# Patient Record
Sex: FEMALE | Race: WHITE | NOT HISPANIC OR LATINO | Employment: UNEMPLOYED | URBAN - METROPOLITAN AREA
[De-identification: names, ages, dates, MRNs, and addresses within clinical notes are randomized per-mention and may not be internally consistent; named-entity substitution may affect disease eponyms.]

---

## 2017-12-20 ENCOUNTER — NON-PROVIDER VISIT (OUTPATIENT)
Dept: URGENT CARE | Facility: PHYSICIAN GROUP | Age: 32
End: 2017-12-20

## 2017-12-20 ENCOUNTER — NON-PROVIDER VISIT (OUTPATIENT)
Dept: OCCUPATIONAL MEDICINE | Facility: CLINIC | Age: 32
End: 2017-12-20

## 2017-12-20 DIAGNOSIS — Z02.1 PRE-EMPLOYMENT DRUG SCREENING: ICD-10-CM

## 2017-12-20 DIAGNOSIS — Z11.1 ENCOUNTER FOR PPD TEST: Primary | ICD-10-CM

## 2017-12-20 LAB
AMP AMPHETAMINE: NORMAL
BAR BARBITURATES: NORMAL
BZO BENZODIAZEPINES: NORMAL
COC COCAINE: NORMAL
INT CON NEG: NEGATIVE
INT CON POS: POSITIVE
MDMA ECSTASY: NORMAL
MET METHAMPHETAMINES: NORMAL
MTD METHADONE: NORMAL
OPI OPIATES: NORMAL
OXY OXYCODONE: NORMAL
PCP PHENCYCLIDINE: NORMAL
POC URINE DRUG SCREEN OCDRS: NEGATIVE
THC: NORMAL

## 2017-12-20 PROCEDURE — 80305 DRUG TEST PRSMV DIR OPT OBS: CPT | Performed by: FAMILY MEDICINE

## 2017-12-20 PROCEDURE — 86580 TB INTRADERMAL TEST: CPT | Performed by: PREVENTIVE MEDICINE

## 2017-12-21 ENCOUNTER — NON-PROVIDER VISIT (OUTPATIENT)
Dept: OCCUPATIONAL MEDICINE | Facility: CLINIC | Age: 32
End: 2017-12-21

## 2017-12-21 VITALS
DIASTOLIC BLOOD PRESSURE: 72 MMHG | HEART RATE: 83 BPM | HEIGHT: 64 IN | SYSTOLIC BLOOD PRESSURE: 116 MMHG | TEMPERATURE: 97.5 F | BODY MASS INDEX: 29.71 KG/M2 | WEIGHT: 174 LBS | RESPIRATION RATE: 112 BRPM | OXYGEN SATURATION: 97 %

## 2017-12-21 DIAGNOSIS — Z02.89 VISIT FOR OCCUPATIONAL HEALTH EXAMINATION: ICD-10-CM

## 2017-12-21 PROCEDURE — 8915 PR COMPREHENSIVE PHYSICAL: Performed by: PREVENTIVE MEDICINE

## 2018-03-01 ENCOUNTER — OFFICE VISIT (OUTPATIENT)
Dept: MEDICAL GROUP | Facility: MEDICAL CENTER | Age: 33
End: 2018-03-01
Payer: COMMERCIAL

## 2018-03-01 ENCOUNTER — HOSPITAL ENCOUNTER (OUTPATIENT)
Dept: LAB | Facility: MEDICAL CENTER | Age: 33
End: 2018-03-01
Attending: PHYSICIAN ASSISTANT
Payer: COMMERCIAL

## 2018-03-01 VITALS
DIASTOLIC BLOOD PRESSURE: 70 MMHG | OXYGEN SATURATION: 94 % | TEMPERATURE: 98.7 F | HEIGHT: 65 IN | HEART RATE: 61 BPM | BODY MASS INDEX: 26.99 KG/M2 | SYSTOLIC BLOOD PRESSURE: 110 MMHG | WEIGHT: 162 LBS

## 2018-03-01 DIAGNOSIS — Z00.00 PREVENTATIVE HEALTH CARE: ICD-10-CM

## 2018-03-01 DIAGNOSIS — Z72.0 TOBACCO ABUSE: ICD-10-CM

## 2018-03-01 DIAGNOSIS — G89.29 OTHER CHRONIC PAIN: ICD-10-CM

## 2018-03-01 DIAGNOSIS — F10.10 ETOH ABUSE: ICD-10-CM

## 2018-03-01 DIAGNOSIS — M79.7 FIBROMYALGIA: ICD-10-CM

## 2018-03-01 DIAGNOSIS — F32.A DEPRESSION, UNSPECIFIED DEPRESSION TYPE: ICD-10-CM

## 2018-03-01 PROBLEM — Z91.51 HX OF SUICIDE ATTEMPT: Status: ACTIVE | Noted: 2018-03-01

## 2018-03-01 LAB
25(OH)D3 SERPL-MCNC: 12 NG/ML (ref 30–100)
ALBUMIN SERPL BCP-MCNC: 4.9 G/DL (ref 3.2–4.9)
ALBUMIN/GLOB SERPL: 1.6 G/DL
ALP SERPL-CCNC: 53 U/L (ref 30–99)
ALT SERPL-CCNC: 18 U/L (ref 2–50)
ANION GAP SERPL CALC-SCNC: 9 MMOL/L (ref 0–11.9)
AST SERPL-CCNC: 20 U/L (ref 12–45)
BASOPHILS # BLD AUTO: 0.7 % (ref 0–1.8)
BASOPHILS # BLD: 0.05 K/UL (ref 0–0.12)
BILIRUB SERPL-MCNC: 0.8 MG/DL (ref 0.1–1.5)
BUN SERPL-MCNC: 17 MG/DL (ref 8–22)
CALCIUM SERPL-MCNC: 9.8 MG/DL (ref 8.5–10.5)
CHLORIDE SERPL-SCNC: 102 MMOL/L (ref 96–112)
CHOLEST SERPL-MCNC: 181 MG/DL (ref 100–199)
CO2 SERPL-SCNC: 23 MMOL/L (ref 20–33)
CREAT SERPL-MCNC: 0.83 MG/DL (ref 0.5–1.4)
CRP SERPL HS-MCNC: 2.32 MG/DL (ref 0–0.75)
EOSINOPHIL # BLD AUTO: 0.24 K/UL (ref 0–0.51)
EOSINOPHIL NFR BLD: 3.4 % (ref 0–6.9)
ERYTHROCYTE [DISTWIDTH] IN BLOOD BY AUTOMATED COUNT: 42.3 FL (ref 35.9–50)
ERYTHROCYTE [SEDIMENTATION RATE] IN BLOOD BY WESTERGREN METHOD: 11 MM/HOUR (ref 0–20)
GLOBULIN SER CALC-MCNC: 3 G/DL (ref 1.9–3.5)
GLUCOSE SERPL-MCNC: 92 MG/DL (ref 65–99)
HCT VFR BLD AUTO: 48 % (ref 37–47)
HDLC SERPL-MCNC: 45 MG/DL
HGB BLD-MCNC: 16.1 G/DL (ref 12–16)
IMM GRANULOCYTES # BLD AUTO: 0.02 K/UL (ref 0–0.11)
IMM GRANULOCYTES NFR BLD AUTO: 0.3 % (ref 0–0.9)
LDLC SERPL CALC-MCNC: 112 MG/DL
LYMPHOCYTES # BLD AUTO: 2 K/UL (ref 1–4.8)
LYMPHOCYTES NFR BLD: 28.2 % (ref 22–41)
MCH RBC QN AUTO: 31.1 PG (ref 27–33)
MCHC RBC AUTO-ENTMCNC: 33.5 G/DL (ref 33.6–35)
MCV RBC AUTO: 92.7 FL (ref 81.4–97.8)
MONOCYTES # BLD AUTO: 0.55 K/UL (ref 0–0.85)
MONOCYTES NFR BLD AUTO: 7.8 % (ref 0–13.4)
NEUTROPHILS # BLD AUTO: 4.23 K/UL (ref 2–7.15)
NEUTROPHILS NFR BLD: 59.6 % (ref 44–72)
NRBC # BLD AUTO: 0 K/UL
NRBC BLD-RTO: 0 /100 WBC
PLATELET # BLD AUTO: 348 K/UL (ref 164–446)
PMV BLD AUTO: 10.9 FL (ref 9–12.9)
POTASSIUM SERPL-SCNC: 3.7 MMOL/L (ref 3.6–5.5)
PROT SERPL-MCNC: 7.9 G/DL (ref 6–8.2)
RBC # BLD AUTO: 5.18 M/UL (ref 4.2–5.4)
RHEUMATOID FACT SER IA-ACNC: <10 IU/ML (ref 0–14)
SODIUM SERPL-SCNC: 134 MMOL/L (ref 135–145)
TRIGL SERPL-MCNC: 121 MG/DL (ref 0–149)
TSH SERPL DL<=0.005 MIU/L-ACNC: 0.52 UIU/ML (ref 0.38–5.33)
WBC # BLD AUTO: 7.1 K/UL (ref 4.8–10.8)

## 2018-03-01 PROCEDURE — 99204 OFFICE O/P NEW MOD 45 MIN: CPT | Performed by: PHYSICIAN ASSISTANT

## 2018-03-01 PROCEDURE — 86140 C-REACTIVE PROTEIN: CPT

## 2018-03-01 PROCEDURE — 36415 COLL VENOUS BLD VENIPUNCTURE: CPT

## 2018-03-01 PROCEDURE — 86200 CCP ANTIBODY: CPT

## 2018-03-01 PROCEDURE — 86235 NUCLEAR ANTIGEN ANTIBODY: CPT

## 2018-03-01 PROCEDURE — 86431 RHEUMATOID FACTOR QUANT: CPT

## 2018-03-01 PROCEDURE — 86038 ANTINUCLEAR ANTIBODIES: CPT

## 2018-03-01 PROCEDURE — 85025 COMPLETE CBC W/AUTO DIFF WBC: CPT

## 2018-03-01 PROCEDURE — 82306 VITAMIN D 25 HYDROXY: CPT

## 2018-03-01 PROCEDURE — 80053 COMPREHEN METABOLIC PANEL: CPT

## 2018-03-01 PROCEDURE — 80061 LIPID PANEL: CPT

## 2018-03-01 PROCEDURE — 84443 ASSAY THYROID STIM HORMONE: CPT

## 2018-03-01 PROCEDURE — 85652 RBC SED RATE AUTOMATED: CPT

## 2018-03-01 PROCEDURE — 86225 DNA ANTIBODY NATIVE: CPT

## 2018-03-01 RX ORDER — GUAIFENESIN/EPHEDRINE HCL 200-12.5MG
TABLET ORAL
COMMUNITY
End: 2018-09-18

## 2018-03-01 ASSESSMENT — PATIENT HEALTH QUESTIONNAIRE - PHQ9
CLINICAL INTERPRETATION OF PHQ2 SCORE: 6
SUM OF ALL RESPONSES TO PHQ QUESTIONS 1-9: 17
5. POOR APPETITE OR OVEREATING: 1 - SEVERAL DAYS

## 2018-03-01 NOTE — LETTER
[facility]  Gabi Taylor P.A.-C.  [address]  [place] [address]  Fax: [phone]   Authorization for Release/Disclosure of   Protected Health Information   Name: FRANCHESCA ORTEZ : 1985 SSN: [id number]   Address: [facility] [address]  [place] [address] Phone:    [phone] (home)    I authorize the entity listed below to release/disclose the PHI below to:   [facility]/Gabi Taylor P.A.-C. and Gabi Taylor P.A.-C.   Provider or Entity Name: [facility]     Address   City, State, [facility]   Phone:      Fax:     Reason for request: continuity of care   Information to be released:    [  ] LAST COLONOSCOPY,  including any PATH REPORT and follow-up  [  ] LAST FIT/COLOGUARD RESULT [  ] LAST DEXA  [  ] LAST MAMMOGRAM  [x  ] LAST PAP  [  ] LAST LABS [  ] RETINA EXAM REPORT  [  ] IMMUNIZATION RECORDS  [  ] Release all info      [  ] Check here and initial the line next to each item to release ALL health information INCLUDING  _____ Care and treatment for drug and / or alcohol abuse  _____ HIV testing, infection status, or AIDS  _____ Genetic Testing    DATES OF SERVICE OR TIME PERIOD TO BE DISCLOSED: _____________  I understand and acknowledge that:  * This Authorization may be revoked at any time by you in writing, except if your health information has already been used or disclosed.  * Your health information that will be used or disclosed as a result of you signing this authorization could be re-disclosed by the recipient. If this occurs, your re-disclosed health information may no longer be protected by State or Federal laws.  * You may refuse to sign this Authorization. Your refusal will not affect your ability to obtain treatment.  * This Authorization becomes effective upon signing and will  on (date) __________.      If no date is indicated, this Authorization will  one (1) year from the signature date.    Name: Franchesca Ortez    Signature:   Date:     3/1/2018       PLEASE FAX REQUESTED RECORDS BACK TO: [phone]
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
## 2018-03-04 LAB
CCP IGG SERPL-ACNC: 4 UNITS (ref 0–19)
NUCLEAR IGG SER QL IA: NORMAL

## 2018-04-03 ENCOUNTER — OFFICE VISIT (OUTPATIENT)
Dept: MEDICAL GROUP | Facility: MEDICAL CENTER | Age: 33
End: 2018-04-03
Payer: COMMERCIAL

## 2018-04-03 VITALS
SYSTOLIC BLOOD PRESSURE: 104 MMHG | DIASTOLIC BLOOD PRESSURE: 68 MMHG | HEART RATE: 93 BPM | OXYGEN SATURATION: 97 % | TEMPERATURE: 98.4 F | WEIGHT: 161.16 LBS | HEIGHT: 64 IN | BODY MASS INDEX: 27.51 KG/M2

## 2018-04-03 DIAGNOSIS — K64.1 GRADE II HEMORRHOIDS: ICD-10-CM

## 2018-04-03 PROCEDURE — 99213 OFFICE O/P EST LOW 20 MIN: CPT | Performed by: PHYSICIAN ASSISTANT

## 2018-04-03 RX ORDER — TIZANIDINE 4 MG/1
4 TABLET ORAL EVERY 6 HOURS PRN
COMMUNITY
End: 2018-09-18

## 2018-04-03 RX ORDER — PREGABALIN 75 MG/1
75 CAPSULE ORAL 3 TIMES DAILY
COMMUNITY
End: 2018-10-10 | Stop reason: SDUPTHER

## 2018-04-03 ASSESSMENT — PATIENT HEALTH QUESTIONNAIRE - PHQ9: CLINICAL INTERPRETATION OF PHQ2 SCORE: 0

## 2018-04-03 NOTE — PROGRESS NOTES
"Chief Complaint   Patient presents with   • Hemorrhoids     Flare ups off & on  for the past few years       HPI  Franchesca Ortez is a 32 y.o. female here today to discuss hemorrhoids. Patient has had hemorrhoids for the past 10 years since she had her baby. She gets flareups on both with constipation.during flareups bowel movements are painful, positive bleeding and pain with sitting. Currently no flare up.       Past medical, surgical, family, and social history is reviewed in Epic chart by me today.   Medications and allergies reviewed and updated in Epic chart by me today.     ROS:   As documented in history of present illness above    Exam:  Blood pressure 104/68, pulse 93, temperature 36.9 °C (98.4 °F), height 1.638 m (5' 4.49\"), weight 73.1 kg (161 lb 2.5 oz), last menstrual period 01/01/2013, SpO2 97 %, not currently breastfeeding.  Constitutional: Alert, no distress, plus 3 vital signs  Skin:  Warm, dry, no rashes invisible areas  Abdomen: Positive skin tags around 6:00, no external hemorrhoids visualized today   Psych: Alert, pleasant, well-groomed, normal affect    A/P:  1. Grade II hemorrhoids  Discussed stool softeners, decreased fluid intake, sitz bath  Patient wants it removed  - REFERRAL TO GASTROENTEROLOGY    Patient has IUD and would like it removed and new one put in.    F/u prn       "

## 2018-08-09 ENCOUNTER — NON-PROVIDER VISIT (OUTPATIENT)
Dept: OCCUPATIONAL MEDICINE | Facility: CLINIC | Age: 33
End: 2018-08-09

## 2018-08-09 DIAGNOSIS — Z11.1 ENCOUNTER FOR PPD TEST: ICD-10-CM

## 2018-08-09 PROCEDURE — 86580 TB INTRADERMAL TEST: CPT | Performed by: PREVENTIVE MEDICINE

## 2018-08-21 ENCOUNTER — APPOINTMENT (OUTPATIENT)
Dept: OCCUPATIONAL MEDICINE | Facility: CLINIC | Age: 33
End: 2018-08-21

## 2018-08-23 ENCOUNTER — NON-PROVIDER VISIT (OUTPATIENT)
Dept: OCCUPATIONAL MEDICINE | Facility: CLINIC | Age: 33
End: 2018-08-23

## 2018-08-23 DIAGNOSIS — Z11.1 ENCOUNTER FOR PPD TEST: ICD-10-CM

## 2018-08-23 PROCEDURE — 86580 TB INTRADERMAL TEST: CPT | Performed by: PREVENTIVE MEDICINE

## 2018-08-27 ENCOUNTER — NON-PROVIDER VISIT (OUTPATIENT)
Dept: URGENT CARE | Facility: CLINIC | Age: 33
End: 2018-08-27

## 2018-08-27 DIAGNOSIS — Z11.1 PPD SCREENING TEST: ICD-10-CM

## 2018-08-27 PROCEDURE — 86580 TB INTRADERMAL TEST: CPT | Performed by: PHYSICIAN ASSISTANT

## 2018-08-29 ENCOUNTER — NON-PROVIDER VISIT (OUTPATIENT)
Dept: URGENT CARE | Facility: CLINIC | Age: 33
End: 2018-08-29
Payer: COMMERCIAL

## 2018-08-29 LAB — TB WHEAL 3D P 5 TU DIAM: 0 MM

## 2018-08-29 NOTE — PROGRESS NOTES
.MAPPDPLACEMENT   1. TB Evaluation questionnaire completed by patient yes  2.- Patient notified to return to clinic for reading between 48-72 hours  3.-PPD Placement documentation completed on TB questionnaire   4.-TB evaluation questionnaire field at  location.

## 2018-09-18 ENCOUNTER — HOSPITAL ENCOUNTER (INPATIENT)
Facility: MEDICAL CENTER | Age: 33
LOS: 1 days | DRG: 918 | End: 2018-09-19
Attending: EMERGENCY MEDICINE | Admitting: HOSPITALIST
Payer: COMMERCIAL

## 2018-09-18 ENCOUNTER — APPOINTMENT (OUTPATIENT)
Dept: RADIOLOGY | Facility: MEDICAL CENTER | Age: 33
DRG: 918 | End: 2018-09-18
Attending: EMERGENCY MEDICINE
Payer: COMMERCIAL

## 2018-09-18 PROBLEM — F10.929 ALCOHOL INTOXICATION (HCC): Status: ACTIVE | Noted: 2018-09-18

## 2018-09-18 PROBLEM — T39.1X2A TYLENOL OVERDOSE, INTENTIONAL SELF-HARM, INITIAL ENCOUNTER (HCC): Status: ACTIVE | Noted: 2018-09-18

## 2018-09-18 LAB
ALBUMIN SERPL BCP-MCNC: 4.6 G/DL (ref 3.2–4.9)
ALBUMIN/GLOB SERPL: 1.6 G/DL
ALP SERPL-CCNC: 57 U/L (ref 30–99)
ALT SERPL-CCNC: 48 U/L (ref 2–50)
AMPHET UR QL SCN: NEGATIVE
ANION GAP SERPL CALC-SCNC: 12 MMOL/L (ref 0–11.9)
APAP SERPL-MCNC: 133 UG/ML (ref 10–30)
APAP SERPL-MCNC: 152 UG/ML (ref 10–30)
AST SERPL-CCNC: 32 U/L (ref 12–45)
BARBITURATES UR QL SCN: NEGATIVE
BASOPHILS # BLD AUTO: 0.7 % (ref 0–1.8)
BASOPHILS # BLD: 0.1 K/UL (ref 0–0.12)
BENZODIAZ UR QL SCN: NEGATIVE
BILIRUB SERPL-MCNC: 0.3 MG/DL (ref 0.1–1.5)
BUN SERPL-MCNC: 12 MG/DL (ref 8–22)
BZE UR QL SCN: NEGATIVE
CALCIUM SERPL-MCNC: 8.6 MG/DL (ref 8.4–10.2)
CANNABINOIDS UR QL SCN: NEGATIVE
CHLORIDE SERPL-SCNC: 108 MMOL/L (ref 96–112)
CO2 SERPL-SCNC: 17 MMOL/L (ref 20–33)
COMMENT 1642: NORMAL
CREAT SERPL-MCNC: 0.82 MG/DL (ref 0.5–1.4)
EOSINOPHIL # BLD AUTO: 0.08 K/UL (ref 0–0.51)
EOSINOPHIL NFR BLD: 0.6 % (ref 0–6.9)
ERYTHROCYTE [DISTWIDTH] IN BLOOD BY AUTOMATED COUNT: 40.5 FL (ref 35.9–50)
GLOBULIN SER CALC-MCNC: 2.9 G/DL (ref 1.9–3.5)
GLUCOSE SERPL-MCNC: 84 MG/DL (ref 65–99)
HCG SERPL QL: NEGATIVE
HCT VFR BLD AUTO: 46.2 % (ref 37–47)
HGB BLD-MCNC: 16.1 G/DL (ref 12–16)
IMM GRANULOCYTES # BLD AUTO: 0.08 K/UL (ref 0–0.11)
IMM GRANULOCYTES NFR BLD AUTO: 0.6 % (ref 0–0.9)
LIPASE SERPL-CCNC: 20 U/L (ref 7–58)
LYMPHOCYTES # BLD AUTO: 3.13 K/UL (ref 1–4.8)
LYMPHOCYTES NFR BLD: 22.6 % (ref 22–41)
MCH RBC QN AUTO: 31.2 PG (ref 27–33)
MCHC RBC AUTO-ENTMCNC: 34.8 G/DL (ref 33.6–35)
MCV RBC AUTO: 89.5 FL (ref 81.4–97.8)
MONOCYTES # BLD AUTO: 0.54 K/UL (ref 0–0.85)
MONOCYTES NFR BLD AUTO: 3.9 % (ref 0–13.4)
NEUTROPHILS # BLD AUTO: 9.94 K/UL (ref 2–7.15)
NEUTROPHILS NFR BLD: 71.6 % (ref 44–72)
NRBC # BLD AUTO: 0 K/UL
NRBC BLD-RTO: 0 /100 WBC
OPIATES UR QL SCN: NEGATIVE
PCP UR QL SCN: NEGATIVE
PLATELET # BLD AUTO: 336 K/UL (ref 164–446)
PMV BLD AUTO: 9.4 FL (ref 9–12.9)
POC BREATHALIZER: 0.17 PERCENT (ref 0–0.01)
POTASSIUM SERPL-SCNC: 3.8 MMOL/L (ref 3.6–5.5)
PROT SERPL-MCNC: 7.5 G/DL (ref 6–8.2)
RBC # BLD AUTO: 5.16 M/UL (ref 4.2–5.4)
SALICYLATES SERPL-MCNC: <4 MG/DL (ref 15–25)
SODIUM SERPL-SCNC: 137 MMOL/L (ref 135–145)
WBC # BLD AUTO: 13.9 K/UL (ref 4.8–10.8)

## 2018-09-18 PROCEDURE — 302970 POC BREATHALIZER

## 2018-09-18 PROCEDURE — 99285 EMERGENCY DEPT VISIT HI MDM: CPT

## 2018-09-18 PROCEDURE — 700111 HCHG RX REV CODE 636 W/ 250 OVERRIDE (IP): Performed by: HOSPITALIST

## 2018-09-18 PROCEDURE — 700105 HCHG RX REV CODE 258: Performed by: EMERGENCY MEDICINE

## 2018-09-18 PROCEDURE — 700105 HCHG RX REV CODE 258

## 2018-09-18 PROCEDURE — 96366 THER/PROPH/DIAG IV INF ADDON: CPT

## 2018-09-18 PROCEDURE — 76937 US GUIDE VASCULAR ACCESS: CPT

## 2018-09-18 PROCEDURE — 700111 HCHG RX REV CODE 636 W/ 250 OVERRIDE (IP): Performed by: EMERGENCY MEDICINE

## 2018-09-18 PROCEDURE — 80307 DRUG TEST PRSMV CHEM ANLYZR: CPT

## 2018-09-18 PROCEDURE — 96375 TX/PRO/DX INJ NEW DRUG ADDON: CPT

## 2018-09-18 PROCEDURE — 93005 ELECTROCARDIOGRAM TRACING: CPT

## 2018-09-18 PROCEDURE — 96365 THER/PROPH/DIAG IV INF INIT: CPT

## 2018-09-18 PROCEDURE — 302970 POC BREATHALIZER: Performed by: EMERGENCY MEDICINE

## 2018-09-18 PROCEDURE — 700102 HCHG RX REV CODE 250 W/ 637 OVERRIDE(OP): Performed by: HOSPITALIST

## 2018-09-18 PROCEDURE — 71045 X-RAY EXAM CHEST 1 VIEW: CPT

## 2018-09-18 PROCEDURE — 700105 HCHG RX REV CODE 258: Performed by: HOSPITALIST

## 2018-09-18 PROCEDURE — HZ2ZZZZ DETOXIFICATION SERVICES FOR SUBSTANCE ABUSE TREATMENT: ICD-10-PCS | Performed by: HOSPITALIST

## 2018-09-18 PROCEDURE — 80053 COMPREHEN METABOLIC PANEL: CPT

## 2018-09-18 PROCEDURE — 700101 HCHG RX REV CODE 250: Performed by: HOSPITALIST

## 2018-09-18 PROCEDURE — 770020 HCHG ROOM/CARE - TELE (206)

## 2018-09-18 PROCEDURE — A9270 NON-COVERED ITEM OR SERVICE: HCPCS | Performed by: HOSPITALIST

## 2018-09-18 PROCEDURE — 99223 1ST HOSP IP/OBS HIGH 75: CPT | Performed by: HOSPITALIST

## 2018-09-18 PROCEDURE — 84703 CHORIONIC GONADOTROPIN ASSAY: CPT

## 2018-09-18 PROCEDURE — 83690 ASSAY OF LIPASE: CPT

## 2018-09-18 PROCEDURE — 85025 COMPLETE CBC W/AUTO DIFF WBC: CPT

## 2018-09-18 PROCEDURE — 36415 COLL VENOUS BLD VENIPUNCTURE: CPT

## 2018-09-18 PROCEDURE — 700101 HCHG RX REV CODE 250: Performed by: EMERGENCY MEDICINE

## 2018-09-18 RX ORDER — PROMETHAZINE HYDROCHLORIDE 25 MG/1
12.5-25 SUPPOSITORY RECTAL EVERY 4 HOURS PRN
Status: DISCONTINUED | OUTPATIENT
Start: 2018-09-18 | End: 2018-09-19 | Stop reason: HOSPADM

## 2018-09-18 RX ORDER — THIAMINE MONONITRATE (VIT B1) 100 MG
100 TABLET ORAL DAILY
Status: DISCONTINUED | OUTPATIENT
Start: 2018-09-19 | End: 2018-09-19 | Stop reason: HOSPADM

## 2018-09-18 RX ORDER — LORAZEPAM 2 MG/ML
1.5 INJECTION INTRAMUSCULAR
Status: DISCONTINUED | OUTPATIENT
Start: 2018-09-18 | End: 2018-09-19 | Stop reason: HOSPADM

## 2018-09-18 RX ORDER — LORAZEPAM 1 MG/1
2 TABLET ORAL
Status: DISCONTINUED | OUTPATIENT
Start: 2018-09-18 | End: 2018-09-19 | Stop reason: HOSPADM

## 2018-09-18 RX ORDER — LORAZEPAM 1 MG/1
TABLET ORAL
Status: COMPLETED
Start: 2018-09-18 | End: 2018-09-18

## 2018-09-18 RX ORDER — TRAMADOL HYDROCHLORIDE 50 MG/1
50 TABLET ORAL EVERY 8 HOURS PRN
Status: ON HOLD | COMMUNITY
End: 2018-09-22

## 2018-09-18 RX ORDER — LORAZEPAM 2 MG/ML
2 INJECTION INTRAMUSCULAR
Status: DISCONTINUED | OUTPATIENT
Start: 2018-09-18 | End: 2018-09-19 | Stop reason: HOSPADM

## 2018-09-18 RX ORDER — BISACODYL 10 MG
10 SUPPOSITORY, RECTAL RECTAL
Status: DISCONTINUED | OUTPATIENT
Start: 2018-09-18 | End: 2018-09-19 | Stop reason: HOSPADM

## 2018-09-18 RX ORDER — ONDANSETRON 4 MG/1
4 TABLET, ORALLY DISINTEGRATING ORAL EVERY 4 HOURS PRN
Status: DISCONTINUED | OUTPATIENT
Start: 2018-09-18 | End: 2018-09-19 | Stop reason: HOSPADM

## 2018-09-18 RX ORDER — AMOXICILLIN 250 MG
2 CAPSULE ORAL 2 TIMES DAILY
Status: DISCONTINUED | OUTPATIENT
Start: 2018-09-18 | End: 2018-09-19 | Stop reason: HOSPADM

## 2018-09-18 RX ORDER — PROMETHAZINE HYDROCHLORIDE 25 MG/1
12.5-25 TABLET ORAL EVERY 4 HOURS PRN
Status: DISCONTINUED | OUTPATIENT
Start: 2018-09-18 | End: 2018-09-19 | Stop reason: HOSPADM

## 2018-09-18 RX ORDER — LORAZEPAM 1 MG/1
1 TABLET ORAL
Status: DISCONTINUED | OUTPATIENT
Start: 2018-09-18 | End: 2018-09-19 | Stop reason: HOSPADM

## 2018-09-18 RX ORDER — LORAZEPAM 1 MG/1
3 TABLET ORAL
Status: DISCONTINUED | OUTPATIENT
Start: 2018-09-18 | End: 2018-09-19 | Stop reason: HOSPADM

## 2018-09-18 RX ORDER — HYDROXYZINE HYDROCHLORIDE 25 MG/1
50-100 TABLET, FILM COATED ORAL EVERY 6 HOURS PRN
Status: DISCONTINUED | OUTPATIENT
Start: 2018-09-18 | End: 2018-09-19 | Stop reason: HOSPADM

## 2018-09-18 RX ORDER — LORAZEPAM 0.5 MG/1
0.5 TABLET ORAL EVERY 4 HOURS PRN
Status: DISCONTINUED | OUTPATIENT
Start: 2018-09-18 | End: 2018-09-19 | Stop reason: HOSPADM

## 2018-09-18 RX ORDER — TRAMADOL HYDROCHLORIDE 50 MG/1
50 TABLET ORAL EVERY 8 HOURS PRN
Status: DISCONTINUED | OUTPATIENT
Start: 2018-09-18 | End: 2018-09-19 | Stop reason: HOSPADM

## 2018-09-18 RX ORDER — ESCITALOPRAM OXALATE 10 MG/1
10 TABLET ORAL DAILY
Status: DISCONTINUED | OUTPATIENT
Start: 2018-09-19 | End: 2018-09-19

## 2018-09-18 RX ORDER — ESCITALOPRAM OXALATE 5 MG/1
5 TABLET ORAL DAILY
Status: SHIPPED | COMMUNITY
End: 2018-09-18

## 2018-09-18 RX ORDER — HYDROXYZINE 50 MG/1
50-100 TABLET, FILM COATED ORAL EVERY 6 HOURS PRN
Status: ON HOLD | COMMUNITY
End: 2018-09-22

## 2018-09-18 RX ORDER — CLONIDINE HYDROCHLORIDE 0.1 MG/1
0.1 TABLET ORAL
Status: DISCONTINUED | OUTPATIENT
Start: 2018-09-18 | End: 2018-09-19 | Stop reason: HOSPADM

## 2018-09-18 RX ORDER — LORAZEPAM 2 MG/ML
0.5 INJECTION INTRAMUSCULAR EVERY 4 HOURS PRN
Status: DISCONTINUED | OUTPATIENT
Start: 2018-09-18 | End: 2018-09-19 | Stop reason: HOSPADM

## 2018-09-18 RX ORDER — ONDANSETRON 2 MG/ML
4 INJECTION INTRAMUSCULAR; INTRAVENOUS EVERY 4 HOURS PRN
Status: DISCONTINUED | OUTPATIENT
Start: 2018-09-18 | End: 2018-09-19 | Stop reason: HOSPADM

## 2018-09-18 RX ORDER — LORAZEPAM 1 MG/1
4 TABLET ORAL
Status: DISCONTINUED | OUTPATIENT
Start: 2018-09-18 | End: 2018-09-19 | Stop reason: HOSPADM

## 2018-09-18 RX ORDER — PREGABALIN 75 MG/1
75 CAPSULE ORAL 3 TIMES DAILY
Status: DISCONTINUED | OUTPATIENT
Start: 2018-09-18 | End: 2018-09-19 | Stop reason: HOSPADM

## 2018-09-18 RX ORDER — ONDANSETRON 2 MG/ML
4 INJECTION INTRAMUSCULAR; INTRAVENOUS ONCE
Status: COMPLETED | OUTPATIENT
Start: 2018-09-18 | End: 2018-09-18

## 2018-09-18 RX ORDER — FOLIC ACID 1 MG/1
1 TABLET ORAL DAILY
Status: DISCONTINUED | OUTPATIENT
Start: 2018-09-19 | End: 2018-09-19 | Stop reason: HOSPADM

## 2018-09-18 RX ORDER — LORAZEPAM 2 MG/ML
1 INJECTION INTRAMUSCULAR
Status: DISCONTINUED | OUTPATIENT
Start: 2018-09-18 | End: 2018-09-19 | Stop reason: HOSPADM

## 2018-09-18 RX ORDER — LORAZEPAM 1 MG/1
1 TABLET ORAL EVERY 4 HOURS PRN
Status: DISCONTINUED | OUTPATIENT
Start: 2018-09-18 | End: 2018-09-19 | Stop reason: HOSPADM

## 2018-09-18 RX ORDER — POLYETHYLENE GLYCOL 3350 17 G/17G
1 POWDER, FOR SOLUTION ORAL
Status: DISCONTINUED | OUTPATIENT
Start: 2018-09-18 | End: 2018-09-19 | Stop reason: HOSPADM

## 2018-09-18 RX ORDER — DEXTROSE AND SODIUM CHLORIDE 5; .9 G/100ML; G/100ML
INJECTION, SOLUTION INTRAVENOUS
Status: COMPLETED
Start: 2018-09-18 | End: 2018-09-19

## 2018-09-18 RX ORDER — ESCITALOPRAM OXALATE 10 MG/1
10 TABLET ORAL DAILY
Status: ON HOLD | COMMUNITY
End: 2018-09-22

## 2018-09-18 RX ADMIN — ACETYLCYSTEINE 3300 MG: 200 INJECTION, SOLUTION INTRAVENOUS at 18:15

## 2018-09-18 RX ADMIN — PREGABALIN 75 MG: 75 CAPSULE ORAL at 19:43

## 2018-09-18 RX ADMIN — DEXTROSE AND SODIUM CHLORIDE 1000 ML: 5; 900 INJECTION, SOLUTION INTRAVENOUS at 22:25

## 2018-09-18 RX ADMIN — ONDANSETRON HYDROCHLORIDE 4 MG: 2 INJECTION INTRAMUSCULAR; INTRAVENOUS at 19:43

## 2018-09-18 RX ADMIN — ACETYLCYSTEINE 6600 MG: 200 INJECTION, SOLUTION INTRAVENOUS at 22:35

## 2018-09-18 RX ADMIN — LORAZEPAM 0.5 MG: 2 INJECTION INTRAMUSCULAR; INTRAVENOUS at 19:57

## 2018-09-18 RX ADMIN — ONDANSETRON 4 MG: 2 INJECTION INTRAMUSCULAR; INTRAVENOUS at 16:06

## 2018-09-18 RX ADMIN — FOLIC ACID: 5 INJECTION, SOLUTION INTRAMUSCULAR; INTRAVENOUS; SUBCUTANEOUS at 22:29

## 2018-09-18 RX ADMIN — ACETYLCYSTEINE 9900 MG: 200 INJECTION, SOLUTION INTRAVENOUS at 17:11

## 2018-09-18 ASSESSMENT — ENCOUNTER SYMPTOMS
VOMITING: 0
WEAKNESS: 0
SHORTNESS OF BREATH: 0
BRUISES/BLEEDS EASILY: 0
FLANK PAIN: 0
COUGH: 0
NERVOUS/ANXIOUS: 1
FEVER: 0
CONSTITUTIONAL NEGATIVE: 1
NEUROLOGICAL NEGATIVE: 1
MUSCULOSKELETAL NEGATIVE: 1
GASTROINTESTINAL NEGATIVE: 1
DEPRESSION: 1
NAUSEA: 0
LOSS OF CONSCIOUSNESS: 0
WEIGHT LOSS: 0
CARDIOVASCULAR NEGATIVE: 1
MYALGIAS: 0
BACK PAIN: 0
CHILLS: 0
ABDOMINAL PAIN: 0
RESPIRATORY NEGATIVE: 1
DIZZINESS: 0

## 2018-09-18 ASSESSMENT — LIFESTYLE VARIABLES
ANXIETY: MODERATELY ANXIOUS OR GUARDED, SO ANXIETY IS INFERRED
AGITATION: NORMAL ACTIVITY
ORIENTATION AND CLOUDING OF SENSORIUM: ORIENTED AND CAN DO SERIAL ADDITIONS
SUBSTANCE_ABUSE: 1
AGITATION: NORMAL ACTIVITY
VISUAL DISTURBANCES: NOT PRESENT
AGITATION: NORMAL ACTIVITY
NAUSEA AND VOMITING: NO NAUSEA AND NO VOMITING
NAUSEA AND VOMITING: *
TREMOR: NO TREMOR
HEADACHE, FULLNESS IN HEAD: NOT PRESENT
VISUAL DISTURBANCES: NOT PRESENT
TOTAL SCORE: 2
NAUSEA AND VOMITING: *
HEADACHE, FULLNESS IN HEAD: VERY MILD
EVER_SMOKED: YES
PAROXYSMAL SWEATS: NO SWEAT VISIBLE
AUDITORY DISTURBANCES: NOT PRESENT
ORIENTATION AND CLOUDING OF SENSORIUM: ORIENTED AND CAN DO SERIAL ADDITIONS
VISUAL DISTURBANCES: NOT PRESENT
TREMOR: NO TREMOR
PAROXYSMAL SWEATS: BARELY PERCEPTIBLE SWEATING. PALMS MOIST
ANXIETY: *
ANXIETY: MILDLY ANXIOUS
TOTAL SCORE: 9
TOTAL SCORE: 7
ORIENTATION AND CLOUDING OF SENSORIUM: ORIENTED AND CAN DO SERIAL ADDITIONS
TREMOR: NO TREMOR
PAROXYSMAL SWEATS: BARELY PERCEPTIBLE SWEATING. PALMS MOIST
HEADACHE, FULLNESS IN HEAD: VERY MILD

## 2018-09-18 ASSESSMENT — PATIENT HEALTH QUESTIONNAIRE - PHQ9
SUM OF ALL RESPONSES TO PHQ9 QUESTIONS 1 AND 2: 2
SUM OF ALL RESPONSES TO PHQ QUESTIONS 1-9: 5
4. FEELING TIRED OR HAVING LITTLE ENERGY: SEVERAL DAYS
9. THOUGHTS THAT YOU WOULD BE BETTER OFF DEAD, OR OF HURTING YOURSELF: NOT AT ALL
8. MOVING OR SPEAKING SO SLOWLY THAT OTHER PEOPLE COULD HAVE NOTICED. OR THE OPPOSITE, BEING SO FIGETY OR RESTLESS THAT YOU HAVE BEEN MOVING AROUND A LOT MORE THAN USUAL: NOT AT ALL
7. TROUBLE CONCENTRATING ON THINGS, SUCH AS READING THE NEWSPAPER OR WATCHING TELEVISION: NOT AT ALL
6. FEELING BAD ABOUT YOURSELF - OR THAT YOU ARE A FAILURE OR HAVE LET YOURSELF OR YOUR FAMILY DOWN: SEVERAL DAYS
3. TROUBLE FALLING OR STAYING ASLEEP OR SLEEPING TOO MUCH: NOT AT ALL
2. FEELING DOWN, DEPRESSED, IRRITABLE, OR HOPELESS: SEVERAL DAYS
1. LITTLE INTEREST OR PLEASURE IN DOING THINGS: SEVERAL DAYS
5. POOR APPETITE OR OVEREATING: SEVERAL DAYS

## 2018-09-18 ASSESSMENT — PAIN SCALES - GENERAL: PAINLEVEL_OUTOF10: 0

## 2018-09-18 NOTE — ED NOTES
Sarah from Lab called with critical result of Tylenol 133 at 1629. Critical lab result read back to Sarah.   Dr. Dinero notified of critical lab result at 1629.  Critical lab result read back by Dr. Dinero.

## 2018-09-18 NOTE — ED NOTES
Chief Complaint   Patient presents with   • Suicidal Ideation     BIB [facility] from home. Pt admits to taking 15 tylenol tadbs 500mg each and drinking vodka. Pt is tearful. Pt denies SI/HI.

## 2018-09-18 NOTE — ED NOTES
Pt requesting family is not updated at this time. Told family that pt does not want information given

## 2018-09-18 NOTE — ED PROVIDER NOTES
ED Provider Note    CHIEF COMPLAINT  Chief Complaint   Patient presents with   • Suicidal Ideation     BIB [facility] from home. Pt admits to taking 15 tylenol tadbs 500mg each and drinking vodka. Pt is tearful. Pt denies SI/HI.        HPI  Franchesca Ortez is a 33 y.o. female who presents with a history of depression and previous suicide attempts, she reports that she has been fighting with her  and today took 15 500 mg Tylenol tablets and drank alcohol at 12 noon.  The timing is not certain, the patient is intoxicated.  She admits that she was trying to hurt herself with this act, she denies hearing voices.  Further HPI cannot be obtained from the patient secondary to her acute alcohol intoxication.    REVIEW OF SYSTEMS  Review of systems cannot be obtained second of the patient's acute alcohol intoxication.    PAST MEDICAL HISTORY   has a past medical history of Depression.    SOCIAL HISTORY  Social History     Social History Main Topics   • Smoking status: Current Every Day Smoker     Packs/day: 0.50     Years: 15.00     Types: Cigarettes   • Smokeless tobacco: Never Used      Comment: 3/4 pk a day   • Alcohol use No   • Drug use: No   • Sexual activity: Yes     Partners: Male     Birth control/ protection: IUD       SURGICAL HISTORY   has a past surgical history that includes gyn surgery and primary c section.    CURRENT MEDICATIONS  Home Medications     Reviewed by Glenny Mancilla (Pharmacy Tech) on 09/18/18 at 1554  Med List Status: Partial   Medication Last Dose Status   escitalopram (LEXAPRO) 10 MG Tab 9/17/2018 Active   hydrOXYzine HCl (ATARAX) 50 MG Tab PRN Active   pregabalin (LYRICA) 75 MG Cap UNK Active   tramadol (ULTRAM) 50 MG Tab UNK Active                  ALLERGIES  No Known Allergies    PHYSICAL EXAM  VITAL SIGNS: /68   Pulse 83   Temp 36.4 °C (97.6 °F)   Resp 18   Wt 66 kg (145 lb 8.1 oz)   SpO2 92%   BMI 24.60 kg/m²  @GABRIEL[[id number]::@   Pulse ox interpretation: I interpret  this pulse ox as normal.  Constitutional: Decreased mental status, smells of alcohol.  HENT: No signs of trauma, Bilateral external ears normal, Nose normal.   Eyes: Pupils are equal and reactive, Conjunctiva normal, Non-icteric.   Neck: Normal range of motion, No tenderness, Supple, No stridor.   Lymphatic: No lymphadenopathy noted.   Cardiovascular: Regular rate and rhythm, no murmurs.   Thorax & Lungs: Normal breath sounds, No respiratory distress, No wheezing, No chest tenderness.   Abdomen: Bowel sounds normal, Soft, No tenderness, No masses, No pulsatile masses. No peritoneal signs.  Skin: Warm, Dry, No erythema, No rash.   Extremities: Intact distal pulses, No edema, No tenderness, No cyanosis.  Musculoskeletal: Good range of motion in all major joints. No tenderness to palpation or major deformities noted.   Neurologic: Alert , Normal motor function, Normal sensory function, No focal deficits noted.   Psychiatric: Flat affect, tearful at times.  Not responding to internal stimuli.  Cooperative.      DIAGNOSTIC STUDIES / PROCEDURES    EKG  This is a 12-lead EKG interpretation by myself.  It is normal sinus rhythm at a rate of 94.  The axis is normal.  The intervals, normal AL and QRS interval.  The QTC is borderline prolonged at 479.  There are nonspecific ST-T wave changes.  My impression of this EKG, it does not meet STEMI criteria, borderline prolonged QT interval, when compared with EKG from May 29, 2016 at that time she had a normal QT interval.  The patient is not prescribed try cyclic antidepressants but possibly indicative of TCA overdose.  Urine drug screen is pending.    LABS  Labs Reviewed   CBC WITH DIFFERENTIAL - Abnormal; Notable for the following:        Result Value    WBC 13.9 (*)     Hemoglobin 16.1 (*)     Neutrophils (Absolute) 9.94 (*)     All other components within normal limits   COMP METABOLIC PANEL - Abnormal; Notable for the following:     Co2 17 (*)     Anion Gap 12.0 (*)     All  other components within normal limits   ACETAMINOPHEN - Abnormal; Notable for the following:     Acetaminophen -Tylenol 152 (*)     All other components within normal limits   SALICYLATE - Abnormal; Notable for the following:     Salicylates, Quant. <4 (*)     All other components within normal limits   ACETAMINOPHEN - Abnormal; Notable for the following:     Acetaminophen -Tylenol 133 (*)     All other components within normal limits   POC BREATHALIZER - Abnormal; Notable for the following:     POC Breathalizer 0.169 (*)     All other components within normal limits   LIPASE   HCG QUAL SERUM   ESTIMATED GFR   DIFFERENTIAL COMMENT   URINE DRUG SCREEN         RADIOLOGY  DX-CHEST-PORTABLE (1 VIEW)   Final Result      No evidence of acute cardiopulmonary process.              COURSE & MEDICAL DECISION MAKING  Pertinent Labs & Imaging studies reviewed. (See chart for details)    Previous records show the patient was here May 27, 2016 for suicidal ideation and overdose and admitted for observation after taking 90 ibuprofen, 200 mg tablets as well as 5 hydroxyzine tablets.     Differential diagnosis: Overdose, alcohol intoxication, suicidal ideation, aspiration pneumonia    The patient's initial Tylenol level at 3 PM is 152 in the toxic range.  She had a second level at 4 PM to determine if the level is increasing or decreasing and that was decreasing to 133.  The patient's alcohol level 0.169.  The patient's pregnancy test is negative.    At this time it is unclear the specific timing of the actual ingestion.  She will be treated with NAC per protocol. I spoke with Dr. Akua Geiger who will admit the patient for the hospitalist service.      The total critical care time on this patient is 40 minutes, resuscitating patient, speaking with admitting physician, and deciphering test results. This 40 minutes is exclusive of separately billable procedures.        FINAL IMPRESSION  1.  Acute Tylenol overdose  2.  Acute alcohol  intoxication  3.  Leukocytosis  4.  Suicidal ideation     The total critical care time is 40 minutes as outlined above    Electronically signed by: Taras Simms, 9/18/2018 1:46 PM

## 2018-09-18 NOTE — ED NOTES
Pt reports abdominal pain and nausea. States that she vomited 45 minutes after ingestion of Tylenol.

## 2018-09-18 NOTE — ED NOTES
"Pt admitted to taking 15 tabs 500mg tylenol approximately 1 hour ago. Pt states she vomited PTA to the ED. [facility] medic reports pt stated \"I don't want to live\" Pt denies SI/HI to this RN.   "

## 2018-09-18 NOTE — ED NOTES
SL placed w/ bld drawn  IV med given for c/oi N/V  Ice chips also given   Comfort measures given

## 2018-09-18 NOTE — ED NOTES
Pt assessed, all items removed from room and placed in bag at Nursing station. Will cont to monitor    Pt low risk for falls, no interventions needed.

## 2018-09-18 NOTE — ED NOTES
Poison Control called, spoke with Yoon [facility].   Recommendation: EKG, recheck Tylenol level at 1600 (4 hours post ingestion per pt report of ingestion time) and give mucomyst based off of scale    ERP notified. EKG complete.

## 2018-09-19 ENCOUNTER — HOSPITAL ENCOUNTER (INPATIENT)
Facility: MEDICAL CENTER | Age: 33
End: 2018-09-19
Attending: SURGERY | Admitting: SURGERY
Payer: COMMERCIAL

## 2018-09-19 ENCOUNTER — PATIENT OUTREACH (OUTPATIENT)
Dept: HEALTH INFORMATION MANAGEMENT | Facility: OTHER | Age: 33
End: 2018-09-19

## 2018-09-19 ENCOUNTER — HOSPITAL ENCOUNTER (INPATIENT)
Facility: MEDICAL CENTER | Age: 33
End: 2018-09-19
Attending: PSYCHIATRY & NEUROLOGY
Payer: COMMERCIAL

## 2018-09-19 ENCOUNTER — HOSPITAL ENCOUNTER (INPATIENT)
Facility: MEDICAL CENTER | Age: 33
LOS: 3 days | DRG: 918 | End: 2018-09-22
Attending: HOSPITALIST | Admitting: HOSPITALIST
Payer: COMMERCIAL

## 2018-09-19 VITALS
DIASTOLIC BLOOD PRESSURE: 69 MMHG | WEIGHT: 168.65 LBS | RESPIRATION RATE: 18 BRPM | BODY MASS INDEX: 28.79 KG/M2 | SYSTOLIC BLOOD PRESSURE: 122 MMHG | OXYGEN SATURATION: 97 % | HEART RATE: 75 BPM | TEMPERATURE: 98.2 F | HEIGHT: 64 IN

## 2018-09-19 DIAGNOSIS — F32.A DEPRESSION, UNSPECIFIED DEPRESSION TYPE: ICD-10-CM

## 2018-09-19 DIAGNOSIS — T14.91XA SUICIDE ATTEMPT (HCC): ICD-10-CM

## 2018-09-19 PROBLEM — E87.20 METABOLIC ACIDOSIS: Status: ACTIVE | Noted: 2018-09-19

## 2018-09-19 PROBLEM — E87.1 HYPONATREMIA: Status: ACTIVE | Noted: 2018-09-19

## 2018-09-19 LAB
ALBUMIN SERPL BCP-MCNC: 3.8 G/DL (ref 3.2–4.9)
ALBUMIN/GLOB SERPL: 1.7 G/DL
ALP SERPL-CCNC: 44 U/L (ref 30–99)
ALT SERPL-CCNC: 34 U/L (ref 2–50)
AMMONIA PLAS-SCNC: 31 UMOL/L (ref 11–45)
ANION GAP SERPL CALC-SCNC: 6 MMOL/L (ref 0–11.9)
APAP SERPL-MCNC: <10 UG/ML (ref 10–30)
AST SERPL-CCNC: 21 U/L (ref 12–45)
BILIRUB SERPL-MCNC: 0.8 MG/DL (ref 0.1–1.5)
BUN SERPL-MCNC: 13 MG/DL (ref 8–22)
CALCIUM SERPL-MCNC: 8.3 MG/DL (ref 8.4–10.2)
CHLORIDE SERPL-SCNC: 103 MMOL/L (ref 96–112)
CO2 SERPL-SCNC: 21 MMOL/L (ref 20–33)
CREAT SERPL-MCNC: 0.76 MG/DL (ref 0.5–1.4)
GLOBULIN SER CALC-MCNC: 2.3 G/DL (ref 1.9–3.5)
GLUCOSE SERPL-MCNC: 107 MG/DL (ref 65–99)
INR PPP: 1.22 (ref 0.87–1.13)
MAGNESIUM SERPL-MCNC: 1.7 MG/DL (ref 1.5–2.5)
PHOSPHATE SERPL-MCNC: 2.8 MG/DL (ref 2.5–4.5)
POTASSIUM SERPL-SCNC: 3.7 MMOL/L (ref 3.6–5.5)
PROT SERPL-MCNC: 6.1 G/DL (ref 6–8.2)
PROTHROMBIN TIME: 15.3 SEC (ref 12–14.6)
SODIUM SERPL-SCNC: 130 MMOL/L (ref 135–145)

## 2018-09-19 PROCEDURE — 700105 HCHG RX REV CODE 258: Performed by: HOSPITALIST

## 2018-09-19 PROCEDURE — 700102 HCHG RX REV CODE 250 W/ 637 OVERRIDE(OP): Performed by: HOSPITALIST

## 2018-09-19 PROCEDURE — A9270 NON-COVERED ITEM OR SERVICE: HCPCS | Performed by: HOSPITALIST

## 2018-09-19 PROCEDURE — 99233 SBSQ HOSP IP/OBS HIGH 50: CPT | Performed by: HOSPITALIST

## 2018-09-19 PROCEDURE — 80307 DRUG TEST PRSMV CHEM ANLYZR: CPT

## 2018-09-19 PROCEDURE — 84100 ASSAY OF PHOSPHORUS: CPT

## 2018-09-19 PROCEDURE — 85610 PROTHROMBIN TIME: CPT

## 2018-09-19 PROCEDURE — 80053 COMPREHEN METABOLIC PANEL: CPT

## 2018-09-19 PROCEDURE — 99406 BEHAV CHNG SMOKING 3-10 MIN: CPT

## 2018-09-19 PROCEDURE — 770020 HCHG ROOM/CARE - TELE (206)

## 2018-09-19 PROCEDURE — 83735 ASSAY OF MAGNESIUM: CPT

## 2018-09-19 PROCEDURE — 82140 ASSAY OF AMMONIA: CPT

## 2018-09-19 RX ORDER — LORAZEPAM 1 MG/1
2 TABLET ORAL
Status: DISCONTINUED | OUTPATIENT
Start: 2018-09-19 | End: 2018-09-20

## 2018-09-19 RX ORDER — THIAMINE MONONITRATE (VIT B1) 100 MG
100 TABLET ORAL DAILY
Status: CANCELLED | OUTPATIENT
Start: 2018-09-20 | End: 2018-09-23

## 2018-09-19 RX ORDER — LORAZEPAM 1 MG/1
0.5 TABLET ORAL EVERY 4 HOURS PRN
Status: DISCONTINUED | OUTPATIENT
Start: 2018-09-19 | End: 2018-09-20

## 2018-09-19 RX ORDER — LORAZEPAM 1 MG/1
4 TABLET ORAL
Status: CANCELLED | OUTPATIENT
Start: 2018-09-19

## 2018-09-19 RX ORDER — THIAMINE MONONITRATE (VIT B1) 100 MG
100 TABLET ORAL DAILY
Status: DISCONTINUED | OUTPATIENT
Start: 2018-09-20 | End: 2018-09-20

## 2018-09-19 RX ORDER — TRAMADOL HYDROCHLORIDE 50 MG/1
50 TABLET ORAL EVERY 8 HOURS PRN
Status: CANCELLED | OUTPATIENT
Start: 2018-09-19

## 2018-09-19 RX ORDER — ONDANSETRON 2 MG/ML
4 INJECTION INTRAMUSCULAR; INTRAVENOUS EVERY 4 HOURS PRN
Status: CANCELLED | OUTPATIENT
Start: 2018-09-19

## 2018-09-19 RX ORDER — LORAZEPAM 1 MG/1
1 TABLET ORAL
Status: DISCONTINUED | OUTPATIENT
Start: 2018-09-19 | End: 2018-09-20

## 2018-09-19 RX ORDER — ONDANSETRON 2 MG/ML
4 INJECTION INTRAMUSCULAR; INTRAVENOUS EVERY 4 HOURS PRN
Status: DISCONTINUED | OUTPATIENT
Start: 2018-09-19 | End: 2018-09-22 | Stop reason: HOSPADM

## 2018-09-19 RX ORDER — LORAZEPAM 2 MG/ML
0.5 INJECTION INTRAMUSCULAR EVERY 4 HOURS PRN
Status: CANCELLED | OUTPATIENT
Start: 2018-09-19

## 2018-09-19 RX ORDER — DEXTROSE AND SODIUM CHLORIDE 5; .9 G/100ML; G/100ML
INJECTION, SOLUTION INTRAVENOUS CONTINUOUS
Status: DISCONTINUED | OUTPATIENT
Start: 2018-09-19 | End: 2018-09-20

## 2018-09-19 RX ORDER — PREGABALIN 75 MG/1
75 CAPSULE ORAL 3 TIMES DAILY
Status: CANCELLED | OUTPATIENT
Start: 2018-09-19

## 2018-09-19 RX ORDER — LORAZEPAM 0.5 MG/1
0.5 TABLET ORAL EVERY 4 HOURS PRN
Status: CANCELLED | OUTPATIENT
Start: 2018-09-19

## 2018-09-19 RX ORDER — LORAZEPAM 2 MG/ML
1 INJECTION INTRAMUSCULAR
Status: DISCONTINUED | OUTPATIENT
Start: 2018-09-19 | End: 2018-09-20

## 2018-09-19 RX ORDER — LORAZEPAM 2 MG/ML
2 INJECTION INTRAMUSCULAR
Status: DISCONTINUED | OUTPATIENT
Start: 2018-09-19 | End: 2018-09-20

## 2018-09-19 RX ORDER — SODIUM CHLORIDE 9 MG/ML
INJECTION, SOLUTION INTRAVENOUS CONTINUOUS
Status: DISCONTINUED | OUTPATIENT
Start: 2018-09-19 | End: 2018-09-19

## 2018-09-19 RX ORDER — ESCITALOPRAM OXALATE 10 MG/1
10 TABLET ORAL DAILY
Status: DISCONTINUED | OUTPATIENT
Start: 2018-09-20 | End: 2018-09-19

## 2018-09-19 RX ORDER — LORAZEPAM 1 MG/1
1 TABLET ORAL
Status: CANCELLED | OUTPATIENT
Start: 2018-09-19

## 2018-09-19 RX ORDER — HYDROXYZINE HYDROCHLORIDE 25 MG/1
50-100 TABLET, FILM COATED ORAL EVERY 6 HOURS PRN
Status: CANCELLED | OUTPATIENT
Start: 2018-09-19

## 2018-09-19 RX ORDER — LORAZEPAM 2 MG/ML
1 INJECTION INTRAMUSCULAR
Status: CANCELLED | OUTPATIENT
Start: 2018-09-19

## 2018-09-19 RX ORDER — LORAZEPAM 2 MG/ML
1.5 INJECTION INTRAMUSCULAR
Status: CANCELLED | OUTPATIENT
Start: 2018-09-19

## 2018-09-19 RX ORDER — TRAMADOL HYDROCHLORIDE 50 MG/1
50 TABLET ORAL EVERY 8 HOURS PRN
Status: DISCONTINUED | OUTPATIENT
Start: 2018-09-19 | End: 2018-09-20

## 2018-09-19 RX ORDER — DEXTROSE AND SODIUM CHLORIDE 5; .9 G/100ML; G/100ML
INJECTION, SOLUTION INTRAVENOUS CONTINUOUS
Status: CANCELLED | OUTPATIENT
Start: 2018-09-19

## 2018-09-19 RX ORDER — LORAZEPAM 2 MG/ML
0.5 INJECTION INTRAMUSCULAR EVERY 4 HOURS PRN
Status: DISCONTINUED | OUTPATIENT
Start: 2018-09-19 | End: 2018-09-20

## 2018-09-19 RX ORDER — FOLIC ACID 1 MG/1
1 TABLET ORAL DAILY
Status: DISCONTINUED | OUTPATIENT
Start: 2018-09-20 | End: 2018-09-20

## 2018-09-19 RX ORDER — ONDANSETRON 4 MG/1
4 TABLET, ORALLY DISINTEGRATING ORAL EVERY 4 HOURS PRN
Status: CANCELLED | OUTPATIENT
Start: 2018-09-19

## 2018-09-19 RX ORDER — CLONIDINE HYDROCHLORIDE 0.1 MG/1
0.1 TABLET ORAL
Status: DISCONTINUED | OUTPATIENT
Start: 2018-09-19 | End: 2018-09-22 | Stop reason: HOSPADM

## 2018-09-19 RX ORDER — LORAZEPAM 1 MG/1
2 TABLET ORAL
Status: CANCELLED | OUTPATIENT
Start: 2018-09-19

## 2018-09-19 RX ORDER — LORAZEPAM 2 MG/ML
2 INJECTION INTRAMUSCULAR
Status: CANCELLED | OUTPATIENT
Start: 2018-09-19

## 2018-09-19 RX ORDER — GABAPENTIN 100 MG/1
100 CAPSULE ORAL 3 TIMES DAILY
Status: DISCONTINUED | OUTPATIENT
Start: 2018-09-19 | End: 2018-09-19

## 2018-09-19 RX ORDER — LORAZEPAM 1 MG/1
1 TABLET ORAL EVERY 4 HOURS PRN
Status: CANCELLED | OUTPATIENT
Start: 2018-09-19

## 2018-09-19 RX ORDER — CLONIDINE HYDROCHLORIDE 0.1 MG/1
0.1 TABLET ORAL
Status: CANCELLED | OUTPATIENT
Start: 2018-09-19

## 2018-09-19 RX ORDER — LORAZEPAM 1 MG/1
3 TABLET ORAL
Status: DISCONTINUED | OUTPATIENT
Start: 2018-09-19 | End: 2018-09-20

## 2018-09-19 RX ORDER — LORAZEPAM 2 MG/ML
1.5 INJECTION INTRAMUSCULAR
Status: DISCONTINUED | OUTPATIENT
Start: 2018-09-19 | End: 2018-09-20

## 2018-09-19 RX ORDER — DEXTROSE AND SODIUM CHLORIDE 5; .9 G/100ML; G/100ML
INJECTION, SOLUTION INTRAVENOUS CONTINUOUS
Status: DISCONTINUED | OUTPATIENT
Start: 2018-09-19 | End: 2018-09-19 | Stop reason: HOSPADM

## 2018-09-19 RX ORDER — FOLIC ACID 1 MG/1
1 TABLET ORAL DAILY
Status: CANCELLED | OUTPATIENT
Start: 2018-09-20 | End: 2018-09-23

## 2018-09-19 RX ORDER — LORAZEPAM 1 MG/1
3 TABLET ORAL
Status: CANCELLED | OUTPATIENT
Start: 2018-09-19

## 2018-09-19 RX ORDER — LORAZEPAM 1 MG/1
1 TABLET ORAL EVERY 4 HOURS PRN
Status: DISCONTINUED | OUTPATIENT
Start: 2018-09-19 | End: 2018-09-20

## 2018-09-19 RX ORDER — HYDROXYZINE 50 MG/1
50-100 TABLET, FILM COATED ORAL EVERY 6 HOURS PRN
Status: DISCONTINUED | OUTPATIENT
Start: 2018-09-19 | End: 2018-09-20

## 2018-09-19 RX ORDER — PREGABALIN 75 MG/1
75 CAPSULE ORAL 3 TIMES DAILY
Status: DISCONTINUED | OUTPATIENT
Start: 2018-09-19 | End: 2018-09-22 | Stop reason: HOSPADM

## 2018-09-19 RX ORDER — ONDANSETRON 4 MG/1
4 TABLET, ORALLY DISINTEGRATING ORAL EVERY 4 HOURS PRN
Status: DISCONTINUED | OUTPATIENT
Start: 2018-09-19 | End: 2018-09-22 | Stop reason: HOSPADM

## 2018-09-19 RX ORDER — LORAZEPAM 1 MG/1
4 TABLET ORAL
Status: DISCONTINUED | OUTPATIENT
Start: 2018-09-19 | End: 2018-09-20

## 2018-09-19 RX ADMIN — LORAZEPAM 1 MG: 1 TABLET ORAL at 18:17

## 2018-09-19 RX ADMIN — LORAZEPAM 0.5 MG: 0.5 TABLET ORAL at 09:11

## 2018-09-19 RX ADMIN — THERA TABS 1 TABLET: TAB at 06:20

## 2018-09-19 RX ADMIN — PREGABALIN 75 MG: 75 CAPSULE ORAL at 14:08

## 2018-09-19 RX ADMIN — LORAZEPAM 0.5 MG: 0.5 TABLET ORAL at 00:55

## 2018-09-19 RX ADMIN — Medication 100 MG: at 06:20

## 2018-09-19 RX ADMIN — DEXTROSE AND SODIUM CHLORIDE: 5; 900 INJECTION, SOLUTION INTRAVENOUS at 18:20

## 2018-09-19 RX ADMIN — LORAZEPAM 0.5 MG: 0.5 TABLET ORAL at 14:41

## 2018-09-19 RX ADMIN — LORAZEPAM 2 MG: 1 TABLET ORAL at 20:13

## 2018-09-19 RX ADMIN — PREGABALIN 75 MG: 75 CAPSULE ORAL at 18:19

## 2018-09-19 RX ADMIN — FOLIC ACID 1 MG: 1 TABLET ORAL at 06:21

## 2018-09-19 RX ADMIN — PREGABALIN 75 MG: 75 CAPSULE ORAL at 08:43

## 2018-09-19 RX ADMIN — DEXTROSE AND SODIUM CHLORIDE: 5; 900 INJECTION, SOLUTION INTRAVENOUS at 14:12

## 2018-09-19 ASSESSMENT — LIFESTYLE VARIABLES
TREMOR: NO TREMOR
ORIENTATION AND CLOUDING OF SENSORIUM: ORIENTED AND CAN DO SERIAL ADDITIONS
VISUAL DISTURBANCES: NOT PRESENT
NAUSEA AND VOMITING: NO NAUSEA AND NO VOMITING
VISUAL DISTURBANCES: NOT PRESENT
HEADACHE, FULLNESS IN HEAD: VERY MILD
ORIENTATION AND CLOUDING OF SENSORIUM: ORIENTED AND CAN DO SERIAL ADDITIONS
TOTAL SCORE: 5
HEADACHE, FULLNESS IN HEAD: NOT PRESENT
AUDITORY DISTURBANCES: NOT PRESENT
TREMOR: TREMOR NOT VISIBLE BUT CAN BE FELT, FINGERTIP TO FINGERTIP
TOTAL SCORE: 3
ORIENTATION AND CLOUDING OF SENSORIUM: ORIENTED AND CAN DO SERIAL ADDITIONS
HEADACHE, FULLNESS IN HEAD: VERY MILD
NAUSEA AND VOMITING: NO NAUSEA AND NO VOMITING
ORIENTATION AND CLOUDING OF SENSORIUM: ORIENTED AND CAN DO SERIAL ADDITIONS
AUDITORY DISTURBANCES: NOT PRESENT
NAUSEA AND VOMITING: NO NAUSEA AND NO VOMITING
PAROXYSMAL SWEATS: NO SWEAT VISIBLE
VISUAL DISTURBANCES: NOT PRESENT
TREMOR: NO TREMOR
NAUSEA AND VOMITING: NO NAUSEA AND NO VOMITING
TOTAL SCORE: 1
HEADACHE, FULLNESS IN HEAD: VERY MILD
HEADACHE, FULLNESS IN HEAD: NOT PRESENT
PAROXYSMAL SWEATS: NO SWEAT VISIBLE
VISUAL DISTURBANCES: NOT PRESENT
PAROXYSMAL SWEATS: *
PAROXYSMAL SWEATS: NO SWEAT VISIBLE
NAUSEA AND VOMITING: NO NAUSEA AND NO VOMITING
VISUAL DISTURBANCES: NOT PRESENT
AUDITORY DISTURBANCES: NOT PRESENT
AGITATION: NORMAL ACTIVITY
ANXIETY: *
TOTAL SCORE: 12
VISUAL DISTURBANCES: NOT PRESENT
ANXIETY: *
ANXIETY: *
TREMOR: *
ORIENTATION AND CLOUDING OF SENSORIUM: ORIENTED AND CAN DO SERIAL ADDITIONS
TREMOR: TREMOR NOT VISIBLE BUT CAN BE FELT, FINGERTIP TO FINGERTIP
TOTAL SCORE: 9
TREMOR: NO TREMOR
NAUSEA AND VOMITING: NO NAUSEA AND NO VOMITING
AGITATION: NORMAL ACTIVITY
VISUAL DISTURBANCES: NOT PRESENT
TOTAL SCORE: VERY MILD ITCHING, PINS AND NEEDLES SENSATION, BURNING OR NUMBNESS
ANXIETY: MODERATELY ANXIOUS OR GUARDED, SO ANXIETY IS INFERRED
HEADACHE, FULLNESS IN HEAD: VERY MILD
ANXIETY: MILDLY ANXIOUS
HEADACHE, FULLNESS IN HEAD: NOT PRESENT
ORIENTATION AND CLOUDING OF SENSORIUM: ORIENTED AND CAN DO SERIAL ADDITIONS
NAUSEA AND VOMITING: NO NAUSEA AND NO VOMITING
AGITATION: *
AGITATION: NORMAL ACTIVITY
TOTAL SCORE: 6
ANXIETY: MILDLY ANXIOUS
TOTAL SCORE: 6
AGITATION: NORMAL ACTIVITY
PAROXYSMAL SWEATS: BARELY PERCEPTIBLE SWEATING. PALMS MOIST
AUDITORY DISTURBANCES: NOT PRESENT
TREMOR: TREMOR NOT VISIBLE BUT CAN BE FELT, FINGERTIP TO FINGERTIP
AGITATION: NORMAL ACTIVITY
PAROXYSMAL SWEATS: NO SWEAT VISIBLE
ORIENTATION AND CLOUDING OF SENSORIUM: ORIENTED AND CAN DO SERIAL ADDITIONS
PAROXYSMAL SWEATS: BARELY PERCEPTIBLE SWEATING. PALMS MOIST
ANXIETY: MODERATELY ANXIOUS OR GUARDED, SO ANXIETY IS INFERRED
AGITATION: *

## 2018-09-19 ASSESSMENT — PAIN SCALES - GENERAL
PAINLEVEL_OUTOF10: 1
PAINLEVEL_OUTOF10: 0

## 2018-09-19 NOTE — DISCHARGE PLANNING
HAILEY was informed that there will be no tele-psych the rest of the week.  HAILEY updated MD and RN.     SW provided pt with ETOH resources and counseling resources.

## 2018-09-19 NOTE — H&P
Hospital Medicine History & Physical Note    Date of Service  9/18/2018    Primary Care Physician  Gabi Taylor P.A.-C.    Consultants  None.    Code Status  Full code.    Chief Complaint  Suicide attempt.    History of Presenting Illness  33 y.o. female who presented 9/18/2018 after taking 15 500mg tablets of acetaminophen with alcohol after an argument with her . She has a history of drinking a pint of alcohol daily, depression and prior suicide attempts. She is acutely intoxicated on arrival to the ER via [facility]. Patient denies prior history of alcohol withdrawal syndrome or seizures.    Review of Systems  Review of Systems   Constitutional: Negative.  Negative for chills, fever, malaise/fatigue and weight loss.   HENT: Negative.    Respiratory: Negative.  Negative for cough and shortness of breath.    Cardiovascular: Negative.  Negative for chest pain and leg swelling.   Gastrointestinal: Negative.  Negative for abdominal pain, nausea and vomiting.   Genitourinary: Negative.  Negative for dysuria and flank pain.   Musculoskeletal: Negative.  Negative for back pain and myalgias.   Neurological: Negative.  Negative for dizziness, loss of consciousness and weakness.   Endo/Heme/Allergies: Negative.  Does not bruise/bleed easily.   Psychiatric/Behavioral: Positive for depression and substance abuse. The patient is nervous/anxious.    All other systems reviewed and are negative.      Past Medical History   has a past medical history of Alcohol abuse; Depression; and History of suicide attempt.    Surgical History   has a past surgical history that includes gyn surgery and primary c section.     Family History  family history includes Cancer in her maternal grandmother; Diabetes in her father; Hypertension in her father.     Social History   reports that she has been smoking Cigarettes.  She has a 7.50 pack-year smoking history. She has never used smokeless tobacco. She reports that she does not drink  alcohol or use drugs.    Allergies  No Known Allergies    Medications  Prior to Admission Medications   Prescriptions Last Dose Informant Patient Reported? Taking?   escitalopram (LEXAPRO) 10 MG Tab 9/17/2018 at AM Patient Yes Yes   Sig: Take 10 mg by mouth every day.   hydrOXYzine HCl (ATARAX) 50 MG Tab PRN at PRN Patient Yes Yes   Sig: Take  mg by mouth every 6 hours as needed for Anxiety.   pregabalin (LYRICA) 75 MG Cap UNK at UNK Patient Yes No   Sig: Take 75 mg by mouth 3 times a day.   tramadol (ULTRAM) 50 MG Tab UNK at UNK Patient Yes Yes   Sig: Take 50 mg by mouth every 8 hours as needed.      Facility-Administered Medications: None       Physical Exam  Blood Pressure: 131/80   Temperature: 36.2 °C (97.2 °F)   Pulse: 85   Respiration: 18   Pulse Oximetry: 92 %     Physical Exam   Constitutional: She appears well-developed and well-nourished. No distress.   Patient seen and examined by me.   HENT:   Nose: Nose normal.   Mouth/Throat: Oropharynx is clear and moist. No oropharyngeal exudate.   Eyes: Conjunctivae are normal. Right eye exhibits no discharge. Left eye exhibits no discharge. No scleral icterus.   Neck: No JVD present. No tracheal deviation present.   Cardiovascular: Normal rate, regular rhythm and normal heart sounds.    Pulmonary/Chest: Effort normal and breath sounds normal. No stridor. No respiratory distress. She has no wheezes. She has no rales. She exhibits no tenderness.   Abdominal: Soft. Bowel sounds are normal. She exhibits no distension and no mass. There is no tenderness. There is no rebound and no guarding.   Musculoskeletal: She exhibits no edema or tenderness.   Neurological: She is alert. No cranial nerve deficit. She exhibits normal muscle tone.   Slurred speech   Skin: Skin is warm and dry. She is not diaphoretic. No pallor.   Psychiatric: She has a normal mood and affect. Her behavior is normal.   Nursing note and vitals reviewed.      Laboratory:  Recent Labs       09/18/18   1500   WBC  13.9*   RBC  5.16   HEMOGLOBIN  16.1*   HEMATOCRIT  46.2   MCV  89.5   MCH  31.2   MCHC  34.8   RDW  40.5   PLATELETCT  336   MPV  9.4     Recent Labs      09/18/18   1500   SODIUM  137   POTASSIUM  3.8   CHLORIDE  108   CO2  17*   GLUCOSE  84   BUN  12   CREATININE  0.82   CALCIUM  8.6     Recent Labs      09/18/18   1500   ALTSGPT  48   ASTSGOT  32   ALKPHOSPHAT  57   TBILIRUBIN  0.3   LIPASE  20   GLUCOSE  84                 No results found for: TROPONINI    Urinalysis:    No results found     Imaging:  DX-CHEST-PORTABLE (1 VIEW)   Final Result      No evidence of acute cardiopulmonary process.            Assessment/Plan:  I anticipate this patient will require at least two midnights for appropriate medical management, necessitating inpatient admission.    Alcohol intoxication (HCC)- (present on admission)   Assessment & Plan    No legal hold at this time due to acute intoxication.  Monitor for signs of withdrawal, telemetry monitor, vital signs every 4 hours.        Tylenol overdose, intentional self-harm, initial encounter ([facility])- (present on admission)   Assessment & Plan    Acetylcysteine infusion initiated; pharmacy protocol ordered.  Test CMP, INR and tylenol level 12 hours after initial labs (0300), if AST is elevated or INR over 1.5 then repeat labs again in 12 hours.  OK to eat regular diet.  Psychiatry consult ordered in EPIC and message left on consult line to arrange telepsych consult.  No legal hold now due to alcohol intoxication.        Tobacco abuse- (present on admission)   Assessment & Plan    Offer nicotine replacement protocol.        ETOH abuse- (present on admission)   Assessment & Plan    History of abuse, acutely intoxicated as well.  Monitor for signs of withdrawal, CIWA protocol with sliding scale ativan as needed.        Depression- (present on admission)   Assessment & Plan    Continue escitalopram daily, atarax as needed, ativan as needed for severe agitation.             VTE prophylaxis: lovenox

## 2018-09-19 NOTE — PROGRESS NOTES
VSS, patient calm and cooperative. Q15 min checks. Does not verbalize any feelings of wanting to hurt herself. Not on a legal hold at this time. Plan for telepsych today at 1300, patient agreeable to plan. CIWA 6 (anxiety and restlessness) 0.5 ativan given with good effect.     Patient refusing lexapro, feels it might have contributed to her suicidal ideation yesterday.     1015 Psych cancelled consult today. Social work following up on plan. MD in to see patient. Consulting with psychiatry to form plan for patient.

## 2018-09-19 NOTE — CARE PLAN
Problem: Safety  Goal: Will remain free from injury  Outcome: PROGRESSING AS EXPECTED  No falls this shift. Ambulated with SBA, gait steady. Calling appropriately for assistance. Falls precautions in place: bed in lowest   and locked position, side rails raised x 2, room near nurses station, call light within reach, bed alarm on, nonslip socks on, purposeful hourly rounding.       Problem: Knowledge Deficit  Goal: Knowledge of disease process/condition, treatment plan, diagnostic tests, and medications will improve  Outcome: PROGRESSING AS EXPECTED  Patient educated on plan of care including CIWA assessment, banana bag, and PRN medications for ETOH withdrawal.   Additional education provided regarding tylenol overdose and mucomyst administration. Patient open to education,  assessments, and able to verbalize understanding.     Problem: Psychosocial Needs:  Goal: Level of anxiety will decrease  Outcome: PROGRESSING AS EXPECTED  CIWA = 9 upon admission. Patient with moderate anxiety, restlessness, nausea and dry heaving. Upon reassessment after IV ativan, CIWA = 2.   SADPERSONS score upon admission = 4. Placed on q15 minute checks overnight. Psych consult placed by MD.  consult added.   Denies suicidal ideation at this time. Patient has been calm and cooperative with plan of care.

## 2018-09-19 NOTE — ASSESSMENT & PLAN NOTE
History of abuse, acutely intoxicated as well.  Monitor for signs of withdrawal, CIWA protocol with sliding scale ativan as needed.

## 2018-09-19 NOTE — DISCHARGE SUMMARY
Discharge Summary    CHIEF COMPLAINT ON ADMISSION  Chief Complaint   Patient presents with   • Suicidal Ideation     BIB [facility] from home. Pt admits to taking 15 tylenol tadbs 500mg each and drinking vodka. Pt is tearful. Pt denies SI/HI.        Reason for Admission  SI     Admission Date  9/18/2018    CODE STATUS  Full Code    HPI & HOSPITAL COURSE  This is a 33 y.o. female here with a suicide attempt with tylenol. Please see  eh/p for details. She will be transferred to [facility] for a psych consultation. Telepsych is not available. Her tylenol overdose appears to be resolving. She has had no other complications.        Therefore, she is discharged in good and stable condition to home with close outpatient follow-up.          Discharge Date  9/19/18    FOLLOW UP ITEMS POST DISCHARGE  psych    DISCHARGE DIAGNOSES  Active Problems:    Depression POA: Yes    ETOH abuse POA: Yes      Overview: A pint of vodka    Tobacco abuse POA: Yes    Tylenol overdose, intentional self-harm, initial encounter ([facility]) POA: Yes    Alcohol intoxication ([facility]) POA: Yes    Metabolic acidosis POA: Unknown    Hyponatremia POA: Unknown  Resolved Problems:    * No resolved hospital problems. *      FOLLOW UP  Future Appointments  Date Time Provider Department Center   9/27/2018 2:20 PM [facility]     Gabi Taylor P.A.-C.  [address]  [address] [place] [id number]  [phone]            MEDICATIONS ON DISCHARGE     Medication List      ASK your doctor about these medications      Instructions   escitalopram 10 MG Tabs  Commonly known as:  LEXAPRO   Take 10 mg by mouth every day.  Dose:  10 mg     hydrOXYzine HCl 50 MG Tabs  Commonly known as:  ATARAX   Take  mg by mouth every 6 hours as needed for Anxiety.  Dose:   mg     pregabalin 75 MG Caps  Commonly known as:  LYRICA   Take 75 mg by mouth 3 times a day.  Dose:  75 mg     tramadol 50 MG Tabs  Commonly known as:  ULTRAM    Take 50 mg by mouth every 8 hours as needed.  Dose:  50 mg            Allergies  No Known Allergies    DIET  Orders Placed This Encounter   Procedures   • Diet Order Regular     Standing Status:   Standing     Number of Occurrences:   1     Order Specific Question:   Diet:     Answer:   Regular [1]       ACTIVITY  As tolerated.  Weight bearing as tolerated    CONSULTATIONS  Psych has been consulted    PROCEDURES  none    LABORATORY  Lab Results   Component Value Date    SODIUM 130 (L) 09/19/2018    POTASSIUM 3.7 09/19/2018    CHLORIDE 103 09/19/2018    CO2 21 09/19/2018    GLUCOSE 107 (H) 09/19/2018    BUN 13 09/19/2018    CREATININE 0.76 09/19/2018        Lab Results   Component Value Date    WBC 13.9 (H) 09/18/2018    HEMOGLOBIN 16.1 (H) 09/18/2018    HEMATOCRIT 46.2 09/18/2018    PLATELETCT 336 09/18/2018        Total time of the discharge process exceeds 32 minutes.

## 2018-09-19 NOTE — ED NOTES
Pt aware of POC to be admitted  PO fluids given as well as comfort measures  IV med infusing well  No acute distress

## 2018-09-19 NOTE — PROGRESS NOTES
Report received from CAROL García. Patient resting comfortably in bed. Declines any needs at this time. Call light in reach. Bed alarm on.

## 2018-09-19 NOTE — CARE PLAN
Problem: Nutritional:  Goal: Achieve adequate nutritional intake  Patient will consume >/= 50% of meals consistently  Outcome: PROGRESSING AS EXPECTED

## 2018-09-19 NOTE — PROGRESS NOTES
Telemetry Strip    Strip printed: [id number]  Measurements from am strip were as follows:  Rhythm:  SR  HR: 66  Measurements:  .16/.08/.38    Telemetry Shift Summary:    Rhythm: SR  HR: 60s-80s  Ectopy: Rare PACs          Normal Values  Rhythm SR  HR Range    Measurements 0.12-0.20 / 0.06-0.10  / 0.30-0.52

## 2018-09-19 NOTE — ASSESSMENT & PLAN NOTE
Stop escitalopram daily- ?prompting suicidal attempts. Possible same with cymbalta. However both these attempts were associated with alcohol.psych has reocmmended neurontin but she is on lyrica thus will defer to them for further recommendations on meds.  atarax as needed, ativan as needed for severe agitation.

## 2018-09-19 NOTE — PROGRESS NOTES
US-guided for insertion of PIV procedure explained to patient. Vein visualized for access. Image of vein obtained and entered into . Aseptic technique was used for cannulation of 20 gauge PIV catheter placed in CORINE. Patency observed with blood return and flushed with NS without difficulty. PIV dressing applied. Patient tolerated procedure.

## 2018-09-19 NOTE — CARE PLAN
Problem: Safety  Goal: Will remain free from injury  Outcome: PROGRESSING AS EXPECTED  Patient steady with ambulation, independent in the room. No suicidal ideation at this time. Monitored Q15 minutes for safety.     Problem: Venous Thromboembolism (VTW)/Deep Vein Thrombosis (DVT) Prevention:  Goal: Patient will participate in Venous Thrombosis (VTE)/Deep Vein Thrombosis (DVT)Prevention Measures  Outcome: PROGRESSING AS EXPECTED  Refused SCDs but up walking in room frequently.

## 2018-09-19 NOTE — PROGRESS NOTES
Direct admit from [facility], Dr. DIMITRI tan for SA, psych eval.  Discharge and readmit orders signed and held, need to be released upon pt arrival. Please page the direct admit on call hospitalist when patient arrives. Pt coming by ground.

## 2018-09-19 NOTE — RESPIRATORY CARE
"COPD Education by COPD Clinical Educator  9/19/2018 at 2:15 PM by Audelia Sheikh    Patient interviewed by COPD education team.  Patient refused full COPD program at this time, but agreed to short intervention.  A comprehensive packet including information about smoking cessation given.  Smoking Cessation Intervention 3 -10 minutes completed.  Provided smoking cessation packet with \"Tips to Quit\" and flyer for \"Free Smoking Cessation Classes\".   "

## 2018-09-19 NOTE — PROGRESS NOTES
1852: Patient arrived to unit from ED. Bedside report received from CAROL Marrero. Patient ambulating to bed with SBA and steady gait.     1917: Tele strip showing NSR, HR = 93    1918: Urine sent for drug screen    1940: Patient's  on telephone attempting to get information about patient condition and length of hospital stay.  placed on hold and spoke with patient. She stated to withhold all medical information from family members, except her mother, Marisela Monique [phone].  informed that no medical information is to be released at this time, per patient request.    1955: Admission completed. Patient refusing skin check at this time. Reports feeling anxiety climbing and is very restless in bed. CIWA = 9. PRN IV ativan given as patient unable to tolerate PO presently, 2/2 of nausea/dry heaving. Psych consult in place.  consult ordered.    2025: RUSSELL Barber notified security of patient request and flag placed in chart.    2055: CIWA = 2. Patient calm and cooperative. Eating dinner, tolerating well. Denies N/V.    2105: Spoke with Linda [facility]. Update given and plan of care discussed. Case number #[id number]  ---------------------------------------------------------------------    Telemetry Summary    Rhythm NSR, ST  Rate 70s - 115 (with activity)  Ectopy Rare PACs  VA 0.16  QRS 0.06  QT 0.34

## 2018-09-19 NOTE — ASSESSMENT & PLAN NOTE
Acetylcysteine infusion complete  Not on legal hold but as patient is compliant with treatment and plan.

## 2018-09-19 NOTE — PSYCHIATRY
PSYCHOLOGY NOTE: Patient not seen. [facility] patients are seen via telepsychiatry. Please follow procedure to schedule telepsychiatry appointment, if you havent done so already. Thank you!

## 2018-09-19 NOTE — H&P
Hospital Medicine History & Physical Note    Date of Service  9/19/2018    Primary Care Physician  Gabi Taylor P.A.-C.    Consultants  Dr Schwarz has been informed of this patient.     Code Status  full    Chief Complaint  Suicide attempt.     History of Presenting Illness  33 y.o. female who presented 9/18/2018 after taking 15 tabs of 500mg tylenol. She had been drinking alcohol and had an argument with her . She was admitted, placed onto the muccomyst protocol and has done well over her hospital course. She has completed her muccomyst and her labs are normal. She had another attempt a few years ago that was similar to this. She says it was shortly after starting cymbalta. This time it has been 5 weeks since starting lexapro. I have discussed this case with Dr Schwarz in . Telepsychiatry is not available presently and she has recommended that the patient be seen by psyciatry. She will be transferred to [facility] for this reason. She is not currently suicidal and is not on a hold. She says she will comply with this plan.     Review of Systems  Review of Systems   HENT: Positive for congestion.        Past Medical History   has a past medical history of Alcohol abuse; Depression; and History of suicide attempt.    Surgical History   has a past surgical history that includes gyn surgery and primary c section.     Family History  family history includes Cancer in her maternal grandmother; Diabetes in her father; Hypertension in her father.     Social History   reports that she has been smoking Cigarettes.  She has a 7.50 pack-year smoking history. She has never used smokeless tobacco. She reports that she does not drink alcohol or use drugs.    Allergies  No Known Allergies    Medications  Prior to Admission Medications   Prescriptions Last Dose Informant Patient Reported? Taking?   escitalopram (LEXAPRO) 10 MG Tab 9/16/2018 at AM Patient Yes Yes   Sig: Take 10 mg by mouth every day.    hydrOXYzine HCl (ATARAX) 50 MG Tab 9/16/2018 at [facility] Patient Yes Yes   Sig: Take  mg by mouth every 6 hours as needed for Anxiety.   pregabalin (LYRICA) 75 MG Cap 9/17/2018 at [facility] Patient Yes No   Sig: Take 75 mg by mouth 3 times a day.   tramadol (ULTRAM) 50 MG Tab 9/16/2018 at [facility] Patient Yes Yes   Sig: Take 50 mg by mouth every 8 hours as needed.      Facility-Administered Medications: None       Physical Exam  Temp:  [36.2 °C (97.2 °F)-36.9 °C (98.4 °F)] 36.7 °C (98.1 °F)  Pulse:  [67-85] 80  Resp:  [16-18] 18  BP: (107-136)/(68-86) 122/84    Physical Exam    Laboratory:  Recent Labs      09/18/18   1500   WBC  13.9*   RBC  5.16   HEMOGLOBIN  16.1*   HEMATOCRIT  46.2   MCV  89.5   MCH  31.2   MCHC  34.8   RDW  40.5   PLATELETCT  336   MPV  9.4     Recent Labs      09/18/18   1500  09/19/18   0439   SODIUM  137  130*   POTASSIUM  3.8  3.7   CHLORIDE  108  103   CO2  17*  21   GLUCOSE  84  107*   BUN  12  13   CREATININE  0.82  0.76   CALCIUM  8.6  8.3*     Recent Labs      09/18/18   1500  09/19/18   0439   ALTSGPT  48  34   ASTSGOT  32  21   ALKPHOSPHAT  57  44   TBILIRUBIN  0.3  0.8   LIPASE  20   --    GLUCOSE  84  107*     Recent Labs      09/19/18   0439   INR  1.22*             No results found for: TROPONINI    Urinalysis:    No results found     Imaging:  DX-CHEST-PORTABLE (1 VIEW)   Final Result      No evidence of acute cardiopulmonary process.            Assessment/Plan:  I anticipate this patient will require at least two midnights for appropriate medical management, necessitating inpatient admission.    Hyponatremia   Assessment & Plan    Iv fluids        Metabolic acidosis   Assessment & Plan    Iv fluids          Alcohol intoxication (HCC)- (present on admission)   Assessment & Plan    resolved        Tylenol overdose, intentional self-harm, initial encounter ([facility])- (present on admission)   Assessment & Plan    Acetylcysteine infusion complete  Not on legal hold but as patient is compliant  with treatment and plan.         Tobacco abuse- (present on admission)   Assessment & Plan    Offer nicotine replacement protocol.        ETOH abuse- (present on admission)   Assessment & Plan    History of abuse, acutely intoxicated as well.  Monitor for signs of withdrawal, CIWA protocol with sliding scale ativan as needed.        Depression- (present on admission)   Assessment & Plan    Stop escitalopram daily- ?prompting suicidal attempts. Possible same with cymbalta. However both these attempts were associated with alcohol.psych has reocmmended neurontin but she is on lyrica thus will defer to them for further recommendations on meds.  atarax as needed, ativan as needed for severe agitation.            VTE prophylaxis: none

## 2018-09-19 NOTE — PROGRESS NOTES
Direct admit from [facility]. Accepted by Dr. Jim Mata for jaundice, cholangitis.  ADT signed & held, needs to be released upon pt arrival.  No written orders received.  Pt coming by ground.    **Hospitalist will NOT be the accepting/admitting MD on this patient per Dr. Mata.**    **Please make sure to release the ADT on arrival**

## 2018-09-19 NOTE — DIETARY
"Nutrition services: Day 1 of admit. Franchesca Ortez is a 33 y.o. female with admitting DX of intentional OD of tylenol.    Pt noted to have poor PO PTA per the nutrition admit screening. Per review of chart, pt admitted after taking multiple tablets of acetaminophen with alcohol. She has a hx of daily EtOH abuse (1 pint daily). Poor PO likely related to sub-optimal nutritional intake in the setting of EtOH abuse. PO intake has been adequate with % x2 meals so far. Will CTM.    Assessment:  Height: 162.6 cm (5' 4\")  Weight: 76.5 kg (168 lb 10.4 oz)  Body mass index is 28.95 kg/m² - over weight  Diet/Intake: Regular, PO % x2 meals    Evaluation:   Labs: Na+ 130, Calcium 8.3  Meds: Lovenox, Lexapro, Lyrica, Periocolace, Thiamine, MVI, Folic acid, (PRN: Clonidine, Atarax, Ativan, Zofran, Miralax, MOM, Dulcolax)  Fluids: D5 NS infusion @ 100mL/hr, acetylcysteine (MUCOMYST) 6,600 mg in D5W 1,000 mL IVPB  Skin: Intact - no skin breakdown noted  GI: last BM 9/19 (med, brown)  I/O's: +1080mL x24 hours (no output yet today)    Malnutrition Risk: Hx of EtOH abuse (1 pint daily)    Recommendations/Plan:  1. Continue current diet.   2. Encourage intake of meals  3. Document intake of all meals as % taken in ADL's to provide interdisciplinary communication across all shifts.   4. Monitor weight.  5. Nutrition rep will continue to see patient for ongoing meal and snack preferences.     RD monitoring.        "

## 2018-09-20 LAB
AMMONIA PLAS-SCNC: 60 UMOL/L (ref 11–45)
EKG IMPRESSION: NORMAL
MAGNESIUM SERPL-MCNC: 1.6 MG/DL (ref 1.5–2.5)
PHOSPHATE SERPL-MCNC: 3.3 MG/DL (ref 2.5–4.5)

## 2018-09-20 PROCEDURE — 36415 COLL VENOUS BLD VENIPUNCTURE: CPT

## 2018-09-20 PROCEDURE — 82140 ASSAY OF AMMONIA: CPT

## 2018-09-20 PROCEDURE — 84100 ASSAY OF PHOSPHORUS: CPT

## 2018-09-20 PROCEDURE — 700102 HCHG RX REV CODE 250 W/ 637 OVERRIDE(OP): Performed by: STUDENT IN AN ORGANIZED HEALTH CARE EDUCATION/TRAINING PROGRAM

## 2018-09-20 PROCEDURE — A9270 NON-COVERED ITEM OR SERVICE: HCPCS | Performed by: STUDENT IN AN ORGANIZED HEALTH CARE EDUCATION/TRAINING PROGRAM

## 2018-09-20 PROCEDURE — 83735 ASSAY OF MAGNESIUM: CPT

## 2018-09-20 PROCEDURE — 700105 HCHG RX REV CODE 258: Performed by: HOSPITALIST

## 2018-09-20 PROCEDURE — 99223 1ST HOSP IP/OBS HIGH 75: CPT | Performed by: PSYCHIATRY & NEUROLOGY

## 2018-09-20 PROCEDURE — 700102 HCHG RX REV CODE 250 W/ 637 OVERRIDE(OP): Performed by: FAMILY MEDICINE

## 2018-09-20 PROCEDURE — A9270 NON-COVERED ITEM OR SERVICE: HCPCS | Performed by: HOSPITALIST

## 2018-09-20 PROCEDURE — A9270 NON-COVERED ITEM OR SERVICE: HCPCS | Performed by: FAMILY MEDICINE

## 2018-09-20 PROCEDURE — 93010 ELECTROCARDIOGRAM REPORT: CPT | Performed by: INTERNAL MEDICINE

## 2018-09-20 PROCEDURE — 90836 PSYTX W PT W E/M 45 MIN: CPT | Performed by: PSYCHIATRY & NEUROLOGY

## 2018-09-20 PROCEDURE — 93005 ELECTROCARDIOGRAM TRACING: CPT | Performed by: STUDENT IN AN ORGANIZED HEALTH CARE EDUCATION/TRAINING PROGRAM

## 2018-09-20 PROCEDURE — 700111 HCHG RX REV CODE 636 W/ 250 OVERRIDE (IP): Performed by: HOSPITALIST

## 2018-09-20 PROCEDURE — 99232 SBSQ HOSP IP/OBS MODERATE 35: CPT | Performed by: INTERNAL MEDICINE

## 2018-09-20 PROCEDURE — 700102 HCHG RX REV CODE 250 W/ 637 OVERRIDE(OP): Performed by: HOSPITALIST

## 2018-09-20 PROCEDURE — 770001 HCHG ROOM/CARE - MED/SURG/GYN PRIV*

## 2018-09-20 RX ORDER — ARIPIPRAZOLE 10 MG/1
5 TABLET ORAL DAILY
Status: DISCONTINUED | OUTPATIENT
Start: 2018-09-20 | End: 2018-09-22 | Stop reason: HOSPADM

## 2018-09-20 RX ORDER — LACTULOSE 20 G/30ML
30 SOLUTION ORAL 2 TIMES DAILY
Status: DISCONTINUED | OUTPATIENT
Start: 2018-09-20 | End: 2018-09-20

## 2018-09-20 RX ADMIN — ENOXAPARIN SODIUM 40 MG: 40 INJECTION SUBCUTANEOUS at 04:59

## 2018-09-20 RX ADMIN — PREGABALIN 75 MG: 75 CAPSULE ORAL at 04:59

## 2018-09-20 RX ADMIN — LORAZEPAM 1 MG: 1 TABLET ORAL at 07:58

## 2018-09-20 RX ADMIN — FOLIC ACID 1 MG: 1 TABLET ORAL at 04:59

## 2018-09-20 RX ADMIN — LORAZEPAM 1 MG: 1 TABLET ORAL at 13:08

## 2018-09-20 RX ADMIN — LACTULOSE 30 ML: 20 SOLUTION ORAL at 04:59

## 2018-09-20 RX ADMIN — PREGABALIN 75 MG: 75 CAPSULE ORAL at 17:55

## 2018-09-20 RX ADMIN — DEXTROSE AND SODIUM CHLORIDE: 5; 900 INJECTION, SOLUTION INTRAVENOUS at 05:01

## 2018-09-20 RX ADMIN — THIAMINE HCL TAB 100 MG 100 MG: 100 TAB at 04:59

## 2018-09-20 RX ADMIN — THERA TABS 1 TABLET: TAB at 04:59

## 2018-09-20 RX ADMIN — ARIPIPRAZOLE 5 MG: 10 TABLET ORAL at 17:55

## 2018-09-20 RX ADMIN — PREGABALIN 75 MG: 75 CAPSULE ORAL at 13:07

## 2018-09-20 ASSESSMENT — LIFESTYLE VARIABLES
ANXIETY: NO ANXIETY (AT EASE)
AUDITORY DISTURBANCES: NOT PRESENT
AGITATION: NORMAL ACTIVITY
ANXIETY: MILDLY ANXIOUS
TOTAL SCORE: 1
TREMOR: NO TREMOR
HEADACHE, FULLNESS IN HEAD: VERY MILD
ALCOHOL_USE: YES
ORIENTATION AND CLOUDING OF SENSORIUM: ORIENTED AND CAN DO SERIAL ADDITIONS
NAUSEA AND VOMITING: NO NAUSEA AND NO VOMITING
PAROXYSMAL SWEATS: NO SWEAT VISIBLE
HEADACHE, FULLNESS IN HEAD: VERY MILD
AUDITORY DISTURBANCES: NOT PRESENT
VISUAL DISTURBANCES: NOT PRESENT
TREMOR: NO TREMOR
PAROXYSMAL SWEATS: NO SWEAT VISIBLE
ORIENTATION AND CLOUDING OF SENSORIUM: ORIENTED AND CAN DO SERIAL ADDITIONS
AGITATION: NORMAL ACTIVITY
NAUSEA AND VOMITING: NO NAUSEA AND NO VOMITING
TOTAL SCORE: 2
VISUAL DISTURBANCES: NOT PRESENT

## 2018-09-20 ASSESSMENT — ENCOUNTER SYMPTOMS
HEMOPTYSIS: 0
SHORTNESS OF BREATH: 0
BLURRED VISION: 0
BLOOD IN STOOL: 0
DIZZINESS: 0
PALPITATIONS: 0
DIARRHEA: 0
DOUBLE VISION: 0
COUGH: 0
FEVER: 0
PND: 0
HEADACHES: 0
VOMITING: 0
CONSTIPATION: 0
ABDOMINAL PAIN: 0
CHILLS: 0
NAUSEA: 0

## 2018-09-20 ASSESSMENT — COGNITIVE AND FUNCTIONAL STATUS - GENERAL
DAILY ACTIVITIY SCORE: 24
SUGGESTED CMS G CODE MODIFIER MOBILITY: CH
SUGGESTED CMS G CODE MODIFIER DAILY ACTIVITY: CH
MOBILITY SCORE: 24

## 2018-09-20 ASSESSMENT — PAIN SCALES - GENERAL
PAINLEVEL_OUTOF10: 0
PAINLEVEL_OUTOF10: 0

## 2018-09-20 NOTE — PSYCHIATRY
BRIEF PSYCHIATRIC CONSULT NOTE: patient seen, full note to follow.  -Legal Hold:voluntary  -Sitter:no - Q 15 minute checks   -Restrictions:   -phone:may have    -visitors:may have    -personal items: yes   -finger foods required: no   -personal clothes: may have  -Orders Placed: routine  -Plan:continue to follow
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
## 2018-09-20 NOTE — PROGRESS NOTES
Received report at 1900 at bedside. Pt A&O x 4 no C/O  pain, . Completed assessment. POC discussed with patient. Belongings and call light within reach.    Dr Costa at bedside. No L2K hold initiated at this time.

## 2018-09-20 NOTE — PROGRESS NOTES
Pt is not suicidal at this time, and is willing to comply with treatment plan. Pt willingly let me go through her belonging upon arrival, she had one travel sized bottle of advil she let me take and place in the med bin, she said we could send it home with her  when he arrives. Other than that no medications in her belongings, general wear, cell phone and purse.

## 2018-09-20 NOTE — PROGRESS NOTES
Discharge/transfer order written. IV left in place for transfer. Belongings gathered. Patient educated and aware of plan. Patient has no further questions about transfer. Transferred via [facility] at 1645 to [facility], [facility], [facility]. RN at [facility] given report at 1700.

## 2018-09-20 NOTE — PROGRESS NOTES
This is a 33 years old female who has a past medical history of major depressive disorder and previous medication overdose for suicidal attempt who swallowed 15 tablets of 500 mg Tylenol as a suicidal attempt along with alcohol ingestion.  Was admitted at [facility] and treated with Mucomyst as per protocol for Tylenol toxicity.  Patient was transferred to [facility] for psychiatric evaluation.  Currently patient denies any suicidal ideation or intentions.  Her ammonia levels were elevated and will start on lactulose.  More recommendations to follow.  Please refer to full H&P done by Dr. Geiger for more details.

## 2018-09-21 LAB
ALBUMIN SERPL BCP-MCNC: 3.7 G/DL (ref 3.2–4.9)
ALBUMIN/GLOB SERPL: 1.7 G/DL
ALP SERPL-CCNC: 43 U/L (ref 30–99)
ALT SERPL-CCNC: 48 U/L (ref 2–50)
ANION GAP SERPL CALC-SCNC: 8 MMOL/L (ref 0–11.9)
APTT PPP: 25.3 SEC (ref 24.7–36)
AST SERPL-CCNC: 34 U/L (ref 12–45)
BASOPHILS # BLD AUTO: 0.6 % (ref 0–1.8)
BASOPHILS # BLD: 0.06 K/UL (ref 0–0.12)
BILIRUB SERPL-MCNC: 0.4 MG/DL (ref 0.1–1.5)
BUN SERPL-MCNC: 11 MG/DL (ref 8–22)
CALCIUM SERPL-MCNC: 9 MG/DL (ref 8.5–10.5)
CHLORIDE SERPL-SCNC: 104 MMOL/L (ref 96–112)
CO2 SERPL-SCNC: 23 MMOL/L (ref 20–33)
CREAT SERPL-MCNC: 0.72 MG/DL (ref 0.5–1.4)
EOSINOPHIL # BLD AUTO: 0.28 K/UL (ref 0–0.51)
EOSINOPHIL NFR BLD: 3 % (ref 0–6.9)
ERYTHROCYTE [DISTWIDTH] IN BLOOD BY AUTOMATED COUNT: 40.2 FL (ref 35.9–50)
GLOBULIN SER CALC-MCNC: 2.2 G/DL (ref 1.9–3.5)
GLUCOSE SERPL-MCNC: 97 MG/DL (ref 65–99)
HCT VFR BLD AUTO: 38.3 % (ref 37–47)
HGB BLD-MCNC: 13 G/DL (ref 12–16)
IMM GRANULOCYTES # BLD AUTO: 0.03 K/UL (ref 0–0.11)
IMM GRANULOCYTES NFR BLD AUTO: 0.3 % (ref 0–0.9)
INR PPP: 0.98 (ref 0.87–1.13)
LYMPHOCYTES # BLD AUTO: 3.71 K/UL (ref 1–4.8)
LYMPHOCYTES NFR BLD: 39.7 % (ref 22–41)
MAGNESIUM SERPL-MCNC: 1.7 MG/DL (ref 1.5–2.5)
MCH RBC QN AUTO: 30.3 PG (ref 27–33)
MCHC RBC AUTO-ENTMCNC: 33.4 G/DL (ref 33.6–35)
MCV RBC AUTO: 90.8 FL (ref 81.4–97.8)
MONOCYTES # BLD AUTO: 0.7 K/UL (ref 0–0.85)
MONOCYTES NFR BLD AUTO: 7.5 % (ref 0–13.4)
NEUTROPHILS # BLD AUTO: 4.56 K/UL (ref 2–7.15)
NEUTROPHILS NFR BLD: 48.9 % (ref 44–72)
NRBC # BLD AUTO: 0 K/UL
NRBC BLD-RTO: 0 /100 WBC
PHOSPHATE SERPL-MCNC: 4.2 MG/DL (ref 2.5–4.5)
PLATELET # BLD AUTO: 264 K/UL (ref 164–446)
PMV BLD AUTO: 9.7 FL (ref 9–12.9)
POTASSIUM SERPL-SCNC: 3.6 MMOL/L (ref 3.6–5.5)
PROT SERPL-MCNC: 5.9 G/DL (ref 6–8.2)
PROTHROMBIN TIME: 13.1 SEC (ref 12–14.6)
RBC # BLD AUTO: 4.22 M/UL (ref 4.2–5.4)
SODIUM SERPL-SCNC: 135 MMOL/L (ref 135–145)
WBC # BLD AUTO: 9.3 K/UL (ref 4.8–10.8)

## 2018-09-21 PROCEDURE — 36415 COLL VENOUS BLD VENIPUNCTURE: CPT

## 2018-09-21 PROCEDURE — 84100 ASSAY OF PHOSPHORUS: CPT

## 2018-09-21 PROCEDURE — A9270 NON-COVERED ITEM OR SERVICE: HCPCS | Performed by: INTERNAL MEDICINE

## 2018-09-21 PROCEDURE — 700102 HCHG RX REV CODE 250 W/ 637 OVERRIDE(OP): Performed by: STUDENT IN AN ORGANIZED HEALTH CARE EDUCATION/TRAINING PROGRAM

## 2018-09-21 PROCEDURE — 700102 HCHG RX REV CODE 250 W/ 637 OVERRIDE(OP): Performed by: HOSPITALIST

## 2018-09-21 PROCEDURE — 700102 HCHG RX REV CODE 250 W/ 637 OVERRIDE(OP): Performed by: INTERNAL MEDICINE

## 2018-09-21 PROCEDURE — 99232 SBSQ HOSP IP/OBS MODERATE 35: CPT | Performed by: INTERNAL MEDICINE

## 2018-09-21 PROCEDURE — A9270 NON-COVERED ITEM OR SERVICE: HCPCS | Performed by: STUDENT IN AN ORGANIZED HEALTH CARE EDUCATION/TRAINING PROGRAM

## 2018-09-21 PROCEDURE — 85610 PROTHROMBIN TIME: CPT

## 2018-09-21 PROCEDURE — A9270 NON-COVERED ITEM OR SERVICE: HCPCS | Performed by: HOSPITALIST

## 2018-09-21 PROCEDURE — 80053 COMPREHEN METABOLIC PANEL: CPT

## 2018-09-21 PROCEDURE — 700111 HCHG RX REV CODE 636 W/ 250 OVERRIDE (IP): Performed by: HOSPITALIST

## 2018-09-21 PROCEDURE — 770001 HCHG ROOM/CARE - MED/SURG/GYN PRIV*

## 2018-09-21 PROCEDURE — 85025 COMPLETE CBC W/AUTO DIFF WBC: CPT

## 2018-09-21 PROCEDURE — 83735 ASSAY OF MAGNESIUM: CPT

## 2018-09-21 PROCEDURE — 85730 THROMBOPLASTIN TIME PARTIAL: CPT

## 2018-09-21 RX ORDER — TRAMADOL HYDROCHLORIDE 50 MG/1
50 TABLET ORAL EVERY 6 HOURS PRN
Status: DISCONTINUED | OUTPATIENT
Start: 2018-09-21 | End: 2018-09-22 | Stop reason: HOSPADM

## 2018-09-21 RX ORDER — GUAIFENESIN/EPHEDRINE HCL 200-12.5MG
100 TABLET ORAL 2 TIMES DAILY
Status: ON HOLD | COMMUNITY
End: 2018-09-22

## 2018-09-21 RX ADMIN — ENOXAPARIN SODIUM 40 MG: 40 INJECTION SUBCUTANEOUS at 05:00

## 2018-09-21 RX ADMIN — PREGABALIN 75 MG: 75 CAPSULE ORAL at 11:43

## 2018-09-21 RX ADMIN — PREGABALIN 75 MG: 75 CAPSULE ORAL at 05:00

## 2018-09-21 RX ADMIN — ARIPIPRAZOLE 5 MG: 10 TABLET ORAL at 05:01

## 2018-09-21 RX ADMIN — PREGABALIN 75 MG: 75 CAPSULE ORAL at 18:04

## 2018-09-21 RX ADMIN — TRAMADOL HYDROCHLORIDE 50 MG: 50 TABLET, COATED ORAL at 12:11

## 2018-09-21 RX ADMIN — THERA TABS 1 TABLET: TAB at 05:00

## 2018-09-21 ASSESSMENT — LIFESTYLE VARIABLES
HOW MANY TIMES IN THE PAST YEAR HAVE YOU HAD 5 OR MORE DRINKS IN A DAY: 2
AVERAGE NUMBER OF DAYS PER WEEK YOU HAVE A DRINK CONTAINING ALCOHOL: 1
TOTAL SCORE: 0
TOTAL SCORE: 0
EVER FELT BAD OR GUILTY ABOUT YOUR DRINKING: NO
EVER_SMOKED: YES
ALCOHOL_USE: YES
TOTAL SCORE: 0
CONSUMPTION TOTAL: POSITIVE
HAVE PEOPLE ANNOYED YOU BY CRITICIZING YOUR DRINKING: NO
HAVE YOU EVER FELT YOU SHOULD CUT DOWN ON YOUR DRINKING: NO
ON A TYPICAL DAY WHEN YOU DRINK ALCOHOL HOW MANY DRINKS DO YOU HAVE: 7
EVER HAD A DRINK FIRST THING IN THE MORNING TO STEADY YOUR NERVES TO GET RID OF A HANGOVER: NO

## 2018-09-21 ASSESSMENT — ENCOUNTER SYMPTOMS
PND: 0
COUGH: 0
BLOOD IN STOOL: 0
HEMOPTYSIS: 0
DIARRHEA: 0
DIZZINESS: 0
ABDOMINAL PAIN: 0
CHILLS: 0
PALPITATIONS: 0
SHORTNESS OF BREATH: 0
VOMITING: 0
BLURRED VISION: 0
NAUSEA: 0
DOUBLE VISION: 0
HEADACHES: 0
FEVER: 0
CONSTIPATION: 0

## 2018-09-21 ASSESSMENT — PATIENT HEALTH QUESTIONNAIRE - PHQ9
5. POOR APPETITE OR OVEREATING: NEARLY EVERY DAY
7. TROUBLE CONCENTRATING ON THINGS, SUCH AS READING THE NEWSPAPER OR WATCHING TELEVISION: NEARLY EVERY DAY
8. MOVING OR SPEAKING SO SLOWLY THAT OTHER PEOPLE COULD HAVE NOTICED. OR THE OPPOSITE, BEING SO FIGETY OR RESTLESS THAT YOU HAVE BEEN MOVING AROUND A LOT MORE THAN USUAL: NEARLY EVERY DAY
SUM OF ALL RESPONSES TO PHQ QUESTIONS 1-9: 25
1. LITTLE INTEREST OR PLEASURE IN DOING THINGS: NEARLY EVERY DAY
3. TROUBLE FALLING OR STAYING ASLEEP OR SLEEPING TOO MUCH: NEARLY EVERY DAY
6. FEELING BAD ABOUT YOURSELF - OR THAT YOU ARE A FAILURE OR HAVE LET YOURSELF OR YOUR FAMILY DOWN: NEARLY EVERY DAY
2. FEELING DOWN, DEPRESSED, IRRITABLE, OR HOPELESS: NEARLY EVERY DAY
9. THOUGHTS THAT YOU WOULD BE BETTER OFF DEAD, OR OF HURTING YOURSELF: SEVERAL DAYS
4. FEELING TIRED OR HAVING LITTLE ENERGY: NEARLY EVERY DAY
SUM OF ALL RESPONSES TO PHQ9 QUESTIONS 1 AND 2: 6

## 2018-09-21 ASSESSMENT — PAIN SCALES - GENERAL
PAINLEVEL_OUTOF10: 0
PAINLEVEL_OUTOF10: 3
PAINLEVEL_OUTOF10: 6
PAINLEVEL_OUTOF10: 0

## 2018-09-21 NOTE — PROGRESS NOTES
Monitor summary:     SR/ST  63-95  0.20/0.08/0.40    Pt changed to medical status and monitor discontinued.

## 2018-09-21 NOTE — PROGRESS NOTES
Received report from CAROL Singleton.  Assumed care of pt.  Completed assessment.  Pt is alert and oriented x4.  VSS.  No signs of distress. On 15 min obs, independent in the room.  Reviewed plan of care with pt.  Call light within reach, bed in low position, will continue to monitor.

## 2018-09-21 NOTE — PROGRESS NOTES
Bedside report received from previous nurse regarding prior 12 hours.  POC reviewed with pt.  15 min obs in place.  White board updated.  Call light within reach.

## 2018-09-21 NOTE — PROGRESS NOTES
Hospital Medicine Daily Progress Note    Date of Service  9/21/2018    Chief Complaint  33 y.o. female admitted 9/19/2018 with Tylenol overdose from [facility]    Hospital Course    Patient admitted from [facility] with tylenol overdose      Interval Problem Update  Patient seen and evaluated on rounds  No complaints  No SI  Mood stable  Will need psychiatry sheba prior to discharge, patient agreeable  Labs stable    Consultants/Specialty  Psychiatry     Code Status  FULL CODE     Disposition  No telemetry needs, can be transferred to medical RNF    Review of Systems  Review of Systems   Constitutional: Negative for chills, fever and malaise/fatigue.   HENT: Negative for hearing loss.    Eyes: Negative for blurred vision and double vision.   Respiratory: Negative for cough, hemoptysis and shortness of breath.    Cardiovascular: Negative for chest pain, palpitations and PND.   Gastrointestinal: Negative for abdominal pain, blood in stool, constipation, diarrhea, melena, nausea and vomiting.   Skin: Negative for rash.   Neurological: Negative for dizziness and headaches.        Physical Exam  Temp:  [36.7 °C (98.1 °F)-36.8 °C (98.3 °F)] 36.8 °C (98.3 °F)  Pulse:  [71-78] 78  Resp:  [16-18] 16  BP: (109-124)/(71-90) 124/77    Physical Exam   Constitutional: She is oriented to person, place, and time. She appears well-developed and well-nourished. No distress.   HENT:   Head: Normocephalic.   Mouth/Throat: Oropharynx is clear and moist. No oropharyngeal exudate.   Eyes: Pupils are equal, round, and reactive to light. Conjunctivae are normal. No scleral icterus.   Neck: No JVD present.   Cardiovascular: Normal rate, regular rhythm and normal heart sounds.  Exam reveals no gallop and no friction rub.    No murmur heard.  Pulmonary/Chest: Breath sounds normal. No stridor. No respiratory distress. She has no wheezes. She has no rales.   Abdominal: Soft. Bowel sounds are normal. She exhibits no distension. There is no tenderness.  There is no rebound and no guarding.   Musculoskeletal: Normal range of motion. She exhibits no edema, tenderness or deformity.   Neurological: She is alert and oriented to person, place, and time. No cranial nerve deficit.   Skin: Skin is warm and dry. She is not diaphoretic.   Psychiatric: She has a normal mood and affect. Her behavior is normal. Judgment and thought content normal.       Fluids  No intake or output data in the 24 hours ending 09/21/18 1534    Laboratory  Recent Labs      09/21/18   0224   WBC  9.3   RBC  4.22   HEMOGLOBIN  13.0   HEMATOCRIT  38.3   MCV  90.8   MCH  30.3   MCHC  33.4*   RDW  40.2   PLATELETCT  264   MPV  9.7     Recent Labs      09/19/18 0439 09/21/18 0224   SODIUM  130*  135   POTASSIUM  3.7  3.6   CHLORIDE  103  104   CO2  21  23   GLUCOSE  107*  97   BUN  13  11   CREATININE  0.76  0.72   CALCIUM  8.3*  9.0     Recent Labs      09/19/18 0439 09/21/18 0224   APTT   --   25.3   INR  1.22*  0.98               Imaging  No orders to display        Assessment/Plan  Overdose- (present on admission)   Assessment & Plan    Tylenol, Liver function stable  Psychiatry following         Hx of suicide attempt- (present on admission)   Assessment & Plan    Recommendations per psychiatry         ETOH abuse- (present on admission)   Assessment & Plan    No withdrawal, stop CIWA at this time  Monitor clinically             VTE prophylaxis: SC Lovenox

## 2018-09-21 NOTE — DISCHARGE PLANNING
"Anticipated Discharge Disposition: Home    Action: This RN CM met with pt at bedside. Pt states she is currently living with  and two young children in [place], however, will be moving to her Mom's apartment once d/c'd. 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 Pt states having \"lack of will\" with ADLs at home she says due to her depression, however, she is feeling better since admission.  No barriers to discharge, home with no needs.       Barriers to Discharge: None    Plan: Home    "

## 2018-09-21 NOTE — PROGRESS NOTES
Hospital Medicine Daily Progress Note    Date of Service  9/20/2018    Chief Complaint  33 y.o. female admitted 9/19/2018 with Tylenol overdose from [facility]    Hospital Course    Patient admitted from [facility] with tylenol overdose      Interval Problem Update  Patient seen and evaluated on rounds  No complaints  No SI  Mood stable  Psychiatry consulted  Legal hold per psychiatry team     Consultants/Specialty  Psychiatry     Code Status  FULL CODE     Disposition  No telemetry needs, can be transferred to medical RNF    Review of Systems  Review of Systems   Constitutional: Negative for chills, fever and malaise/fatigue.   HENT: Negative for hearing loss.    Eyes: Negative for blurred vision and double vision.   Respiratory: Negative for cough, hemoptysis and shortness of breath.    Cardiovascular: Negative for chest pain, palpitations and PND.   Gastrointestinal: Negative for abdominal pain, blood in stool, constipation, diarrhea, melena, nausea and vomiting.   Skin: Negative for rash.   Neurological: Negative for dizziness and headaches.        Physical Exam  Temp:  [36.6 °C (97.8 °F)-37 °C (98.6 °F)] 36.7 °C (98.1 °F)  Pulse:  [67-96] 72  Resp:  [16-19] 18  BP: ()/(51-90) 123/90    Physical Exam   Constitutional: She is oriented to person, place, and time. She appears well-developed and well-nourished. No distress.   HENT:   Head: Normocephalic.   Mouth/Throat: Oropharynx is clear and moist. No oropharyngeal exudate.   Eyes: Pupils are equal, round, and reactive to light. Conjunctivae are normal. No scleral icterus.   Neck: No JVD present.   Cardiovascular: Normal rate, regular rhythm and normal heart sounds.  Exam reveals no gallop and no friction rub.    No murmur heard.  Pulmonary/Chest: Breath sounds normal. No stridor. No respiratory distress. She has no wheezes. She has no rales.   Abdominal: Soft. Bowel sounds are normal. She exhibits no distension. There is no tenderness. There is no rebound and no  guarding.   Musculoskeletal: Normal range of motion. She exhibits no edema, tenderness or deformity.   Neurological: She is alert and oriented to person, place, and time. No cranial nerve deficit.   Skin: Skin is warm and dry. She is not diaphoretic.   Psychiatric: She has a normal mood and affect. Her behavior is normal. Judgment and thought content normal.       Fluids    Intake/Output Summary (Last 24 hours) at 09/20/18 1743  Last data filed at 09/20/18 0800   Gross per 24 hour   Intake              240 ml   Output                0 ml   Net              240 ml       Laboratory  Recent Labs      09/18/18   1500   WBC  13.9*   RBC  5.16   HEMOGLOBIN  16.1*   HEMATOCRIT  46.2   MCV  89.5   MCH  31.2   MCHC  34.8   RDW  40.5   PLATELETCT  336   MPV  9.4     Recent Labs      09/18/18   1500  09/19/18   0439   SODIUM  137  130*   POTASSIUM  3.8  3.7   CHLORIDE  108  103   CO2  17*  21   GLUCOSE  84  107*   BUN  12  13   CREATININE  0.82  0.76   CALCIUM  8.6  8.3*     Recent Labs      09/19/18   0439   INR  1.22*               Imaging  No orders to display        Assessment/Plan  Overdose- (present on admission)   Assessment & Plan    Tylenol, Repeat LFTs / INR in am tomorrow  Psychiatry following         Hx of suicide attempt- (present on admission)   Assessment & Plan    Recommendations per psychiatry         ETOH abuse- (present on admission)   Assessment & Plan    No withdrawal, stop CIWA at this time  Monitor clinically             VTE prophylaxis: SC Lovenox

## 2018-09-21 NOTE — PROGRESS NOTES
Pt is alert and oriented x4.  Denies any pain, does complain of anxiety, CIWA scale discontinued, no other meds available.  Changed to medical status.  IV pulled out this morning accidentally, MD ok with keeping it out.  Telemetry discontinued.  Will continue to monitor.

## 2018-09-22 VITALS
BODY MASS INDEX: 28.98 KG/M2 | SYSTOLIC BLOOD PRESSURE: 116 MMHG | WEIGHT: 169.75 LBS | HEART RATE: 93 BPM | HEIGHT: 64 IN | OXYGEN SATURATION: 98 % | TEMPERATURE: 97.9 F | RESPIRATION RATE: 18 BRPM | DIASTOLIC BLOOD PRESSURE: 69 MMHG

## 2018-09-22 PROCEDURE — 700102 HCHG RX REV CODE 250 W/ 637 OVERRIDE(OP): Performed by: INTERNAL MEDICINE

## 2018-09-22 PROCEDURE — 700102 HCHG RX REV CODE 250 W/ 637 OVERRIDE(OP): Performed by: STUDENT IN AN ORGANIZED HEALTH CARE EDUCATION/TRAINING PROGRAM

## 2018-09-22 PROCEDURE — A9270 NON-COVERED ITEM OR SERVICE: HCPCS | Performed by: HOSPITALIST

## 2018-09-22 PROCEDURE — 99239 HOSP IP/OBS DSCHRG MGMT >30: CPT | Performed by: INTERNAL MEDICINE

## 2018-09-22 PROCEDURE — A9270 NON-COVERED ITEM OR SERVICE: HCPCS | Performed by: STUDENT IN AN ORGANIZED HEALTH CARE EDUCATION/TRAINING PROGRAM

## 2018-09-22 PROCEDURE — 99232 SBSQ HOSP IP/OBS MODERATE 35: CPT | Performed by: PSYCHIATRY & NEUROLOGY

## 2018-09-22 PROCEDURE — A9270 NON-COVERED ITEM OR SERVICE: HCPCS | Performed by: INTERNAL MEDICINE

## 2018-09-22 PROCEDURE — 700111 HCHG RX REV CODE 636 W/ 250 OVERRIDE (IP): Performed by: HOSPITALIST

## 2018-09-22 PROCEDURE — 700102 HCHG RX REV CODE 250 W/ 637 OVERRIDE(OP): Performed by: HOSPITALIST

## 2018-09-22 PROCEDURE — 90836 PSYTX W PT W E/M 45 MIN: CPT | Performed by: PSYCHIATRY & NEUROLOGY

## 2018-09-22 RX ORDER — NALTREXONE HYDROCHLORIDE 50 MG/1
50 TABLET, FILM COATED ORAL DAILY
Qty: 30 TAB | Refills: 0 | Status: SHIPPED | OUTPATIENT
Start: 2018-09-24 | End: 2018-10-10 | Stop reason: SDUPTHER

## 2018-09-22 RX ORDER — ARIPIPRAZOLE 5 MG/1
5 TABLET ORAL DAILY
Qty: 30 TAB | Refills: 0 | Status: SHIPPED | OUTPATIENT
Start: 2018-09-23 | End: 2018-10-10 | Stop reason: SDUPTHER

## 2018-09-22 RX ADMIN — ARIPIPRAZOLE 5 MG: 10 TABLET ORAL at 05:01

## 2018-09-22 RX ADMIN — PREGABALIN 75 MG: 75 CAPSULE ORAL at 05:01

## 2018-09-22 RX ADMIN — ENOXAPARIN SODIUM 40 MG: 40 INJECTION SUBCUTANEOUS at 05:02

## 2018-09-22 RX ADMIN — PREGABALIN 75 MG: 75 CAPSULE ORAL at 12:42

## 2018-09-22 RX ADMIN — THERA TABS 1 TABLET: TAB at 05:03

## 2018-09-22 ASSESSMENT — PAIN SCALES - GENERAL: PAINLEVEL_OUTOF10: 0

## 2018-09-22 NOTE — DISCHARGE SUMMARY
Discharge Summary    CHIEF COMPLAINT ON ADMISSION  No chief complaint on file.      Reason for Admission  overdose     Admission Date  9/19/2018    CODE STATUS  Full Code    HPI & HOSPITAL COURSE  This is a 33 y.o. female here with tylenol overdose. She was transferred to [facility] from [facility]. No LFTs elevation or hepatic derangement related to overdose noted during hospital stay. She was transferred to [facility] for psychiatry evaluation. Psychiatry team evaluated the patient. I have personally discussed the case with Dr Schwarz from psychiatry on 09/22/18 who has evaluated the patient on 09/22/18 and recommend abilify / naltrexone on discharge and recommended that I provide the patient with 1 month supply for this. Patient has been cleared for discharge from psychiatry standpoint at this time. No further SI / HI. Mood is stable. She is eager to go home. Dr Schwarz has spend 1 hour on 09/22/18 with the patient and . She is cleared for discharge by psychiatry. She is discharge home. Medications prescribed per psychiatry team. Advised follow up with psychiatry in outpatient setting. Schedulers informed to arrange follow up with PCP following discharge.     Therefore, she is discharged in good and stable condition to home with close outpatient follow-up.    The patient met 2-midnight criteria for an inpatient stay at the time of discharge.    Discharge Date  09/22/18    FOLLOW UP ITEMS POST DISCHARGE  F/U PCP and Psychiatry     DISCHARGE DIAGNOSES  Active Problems:    Overdose POA: Yes    Hx of suicide attempt POA: Yes    ETOH abuse POA: Yes      Overview: A pint of vodka  Resolved Problems:    * No resolved hospital problems. *      FOLLOW UP  Future Appointments  Date Time Provider Department Center   9/27/2018 2:20 PM ELIZABETH Mistry     No follow-up provider specified.    MEDICATIONS ON DISCHARGE     Medication List      START taking these medications      Instructions   aripiprazole 5 MG  tablet  Commonly known as:  ABILIFY   Take 1 Tab by mouth every day.  Dose:  5 mg     naltrexone 50 MG Tabs  Start taking on:  9/24/2018  Commonly known as:  DEPADE   Take 1 Tab by mouth every day for 30 days.  Dose:  50 mg        CONTINUE taking these medications      Instructions   pregabalin 75 MG Caps  Commonly known as:  LYRICA   Take 75 mg by mouth 3 times a day.  Dose:  75 mg        STOP taking these medications    5- MG Caps     escitalopram 10 MG Tabs  Commonly known as:  LEXAPRO     hydrOXYzine HCl 50 MG Tabs  Commonly known as:  ATARAX     tramadol 50 MG Tabs  Commonly known as:  ULTRAM            Allergies  No Known Allergies    DIET  Orders Placed This Encounter   Procedures   • Diet Order Regular     Standing Status:   Standing     Number of Occurrences:   1     Order Specific Question:   Diet:     Answer:   Regular [1]       ACTIVITY  As tolerated.  Weight bearing as tolerated    CONSULTATIONS  Psychiatry     PROCEDURES  None    LABORATORY  Lab Results   Component Value Date    SODIUM 135 09/21/2018    POTASSIUM 3.6 09/21/2018    CHLORIDE 104 09/21/2018    CO2 23 09/21/2018    GLUCOSE 97 09/21/2018    BUN 11 09/21/2018    CREATININE 0.72 09/21/2018        Lab Results   Component Value Date    WBC 9.3 09/21/2018    HEMOGLOBIN 13.0 09/21/2018    HEMATOCRIT 38.3 09/21/2018    PLATELETCT 264 09/21/2018        Total time of the discharge process exceeds 32 minutes.

## 2018-09-22 NOTE — PROGRESS NOTES
Bedside report received from previous nurse regarding prior 12 hours.  POC reviewed with pt.  Pt denies pain.  White board updated.  Pt verbalizes understanding.  Call light within reach.

## 2018-09-22 NOTE — PROGRESS NOTES
Received report from nightshift RN, assumed care of patient. Patient is A&O x 4, no bed alarm indicated. Patient educated on importance of calling if in need of assistance. Verbalizes understanding. Patient declines pain at this time. Patient updated on plan of care, voices no concerns at this time. Will continue to monitor for safety and comfort. Close obs in place.

## 2018-09-22 NOTE — DISCHARGE INSTRUCTIONS
Discharge Instructions    Discharged to home by car with relative. Discharged via wheelchair, hospital escort: Yes.  Special equipment needed: Not Applicable    Be sure to schedule a follow-up appointment with your primary care doctor or any specialists as instructed.     Discharge Plan:   Diet Plan: Discussed  Activity Level: Discussed  Smoking Cessation Offered: Patient Counseled  Confirmed Follow up Appointment: Patient to Call and Schedule Appointment  Confirmed Symptoms Management: Discussed  Medication Reconciliation Updated: Yes  Influenza Vaccine Indication: Patient Refuses    I understand that a diet low in cholesterol, fat, and sodium is recommended for good health. Unless I have been given specific instructions below for another diet, I accept this instruction as my diet prescription.   Other diet: N/A    Special Instructions: None    · Is patient discharged on Warfarin / Coumadin?   No     Depression / Suicide Risk    As you are discharged from this [facility] facility, it is important to learn how to keep safe from harming yourself.    Recognize the warning signs:  · Abrupt changes in personality, positive or negative- including increase in energy   · Giving away possessions  · Change in eating patterns- significant weight changes-  positive or negative  · Change in sleeping patterns- unable to sleep or sleeping all the time   · Unwillingness or inability to communicate  · Depression  · Unusual sadness, discouragement and loneliness  · Talk of wanting to die  · Neglect of personal appearance   · Rebelliousness- reckless behavior  · Withdrawal from people/activities they love  · Confusion- inability to concentrate     If you or a loved one observes any of these behaviors or has concerns about self-harm, here's what you can do:  · Talk about it- your feelings and reasons for harming yourself  · Remove any means that you might use to hurt yourself (examples: pills, rope, extension cords, firearm)  · Get  professional help from the community (Mental Health, Substance Abuse, psychological counseling)  · Do not be alone:Call your Safe Contact- someone whom you trust who will be there for you.  · Call your local CRISIS HOTLINE 041-2895 or 061-228-1086  · Call your local Children's Mobile Crisis Response Team [place] (100) 984-4067 or www.SkiApps.com  · Call the toll free National Suicide Prevention Hotlines   · National Suicide Prevention Lifeline 563-947-NOUO (7869)  · [employer] Hope Line Network 800-SUICIDE (078-5621)      Acetaminophen Overdose  When acetaminophen is used as directed, it is a very safe and effective medicine that can help relieve pain or fever. However, when taken in large doses, it can lead to an acetaminophen overdose. An overdose of this medicine can lead to serious problems, such as liver damage and death. The maximum recommended dosage of acetaminophen when taken out of the hospital is 3,000 mg a day in adults, or 5 doses a day in children.  Acetaminophen is available in tablets that you can swallow, chew, or dissolve on your tongue. It is also available as a liquid or suppository. All types can be purchased over the counter. Acetaminophen is found in many other over-the-counter medicines, such as medicines for cough, cold, and flu. Acetaminophen is also contained in many prescription-strength pain medicines. Check all medicine labels for the presence and dosage of acetaminophen. Medicine labels may use abbreviations or other names for acetaminophen, such as APAP, AC, or Acetamin. One common brand name of acetaminophen is Tylenol®.  What are the causes?  An acetaminophen overdose is caused by taking a larger-than-recommended dose of acetaminophen.  What are the signs or symptoms?  Symptoms may not appear for hours or even days following the actual overdose. Symptoms may include:  · Fatigue.  · Abdominal pain.  · Nausea or vomiting.  · Confusion.  · Loss of appetite.  · Unexplained  sweating.  · Enlarged liver.  · Yellowing of the skin (jaundice).  · Convulsions.  · Coma.  How is this diagnosed?  Your health care provider will perform a physical exam and ask about your medical history. Testing may be required and may include:  · Blood tests to determine how much acetaminophen is in your blood.  · Blood tests to look for liver problems.  · Close monitoring of your blood pressure, pulse, temperature, and breathing.  · Ultrasound or CT scan of your abdomen.  How is this treated?  Your treatment will depend on your test results and may include:  · Emptying your stomach.  · Giving you activated charcoal by mouth.  · Using medicines to treat your symptoms or reverse the effect of the acetaminophen.  Follow these instructions at home:  After you have been examined and treated by your health care provider:  · Take medicines only as directed by your health care provider. Avoid any medicines that contain acetaminophen.  · Follow up with your health care provider as directed. This is very important. You may need follow-up blood tests to check your liver.  · Do not drink alcohol.  · Drink enough fluid to keep your urine clear or pale yellow.  Get help right away if:  · There has been an intentional or accidental ingestion of acetaminophen.  A severe overdose may represent a serious problem that is an emergency. Do not wait to see if the symptoms will go away. Get medical help right away. Call your local emergency services (911 in the [place] Do not drive yourself to the hospital. If an overdose is suspected, you can also call the [facility] at 1-759.550.2234.  This information is not intended to replace advice given to you by your health care provider. Make sure you discuss any questions you have with your health care provider.  Document Released: 10/02/2015 Document Revised: 05/31/2017 Document Reviewed: 04/29/2015  Elsevier Interactive Patient Education © 2017 [employer]vier Inc.  Suicidal  Feelings: How to Help Yourself  Suicide is the taking of one's own life. If you feel as though life is getting too tough to handle and are thinking about suicide, get help right away. To get help:  · Call your local emergency services (911 in the [place]  · Call a suicide hotline to speak with a trained counselor who understands how you are feeling. The following is a list of suicide hotlines in the [place]. For a list of hotlines in [place], visit www.suicide.org/hotlines/international/slglke-ukecgtb-renjilzb.html.  ¨ 9-569-481-TALK (1-372.444.1528).  ¨ 0-285-SFUJORU (1-577.535.7812).  ¨ 1-407.147.8513. This is a hotline for [place] speakers.  ¨ 7-137-398-4TTY (1-313.843.6375). This is a hotline for TTY users.  ¨ 5-115-5-U-JORGE (1-970.953.3890). This is a hotline for lesbian, sifuentes, bisexual, transgender, or questioning youth.  · Contact a crisis center or a local suicide prevention center. To find a crisis center or suicide prevention center:  ¨ Call your local hospital, clinic, community service organization, mental health center, social service provider, or health department. Ask for assistance in connecting to a crisis center.  ¨ Visit www.suicidepreventionlifeline.org/getinvolved/ for a list of crisis centers in the [place], or visit www.suicideprevention.ca/vweyrgnx-fsdbe-bzjsuip/find-a-crisis-centre for a list of centers in [place].  · Visit the following websites:  ¨ National Suicide Prevention Lifeline: www.suicidepreventionlifeline.org  ¨ Hopeline: www.hopeline.com  ¨ American Foundation for Suicide Prevention: www.afsp.org  ¨ The Jorge Project (for lesbian, sifuentes, bisexual, transgender, or questioning youth): www.thetrevorproject.org  How can I help myself feel better?  · Promise yourself that you will not do anything drastic when you have suicidal feelings. Remember, there is hope. Many people have gotten through suicidal thoughts and feelings, and you will, too. You may have gotten  through them before, and this proves that you can get through them again.  · Let family, friends, teachers, or counselors know how you are feeling. Try not to isolate yourself from those who care about you. Remember, they will want to help you. Talk with someone every day, even if you do not feel sociable. Face-to-face conversation is best.  · Call a mental health professional and see one regularly.  · Visit your primary health care provider every year.  · Eat a well-balanced diet, and space your meals so you eat regularly.  · Get plenty of rest.  · Avoid alcohol and drugs, and remove them from your home. They will only make you feel worse.  · If you are thinking of taking a lot of medicine, give your medicine to someone who can give it to you one day at a time. If you are on antidepressants and are concerned you will overdose, let your health care provider know so he or she can give you safer medicines. Ask your mental health professional about the possible side effects of any medicines you are taking.  · Remove weapons, poisons, knives, and anything else that could harm you from your home.  · Try to stick to routines. Follow a schedule every day. Put self-care on your schedule.  · Make a list of realistic goals, and cross them off when you achieve them. Accomplishments give a sense of worth.  · Wait until you are feeling better before doing the things you find difficult or unpleasant.  · Exercise if you are able. You will feel better if you exercise for even a half hour each day.  · Go out in the sun or into nature. This will help you recover from depression faster. If you have a favorite place to walk, go there.  · Do the things that have always given you pleasure. Play your favorite music, read a good book, paint a picture, play your favorite instrument, or do anything else that takes your mind off your depression if it is safe to do.  · Keep your living space well lit.  · When you are feeling well, write  yourself a letter about tips and support that you can read when you are not feeling well.  · Remember that life’s difficulties can be sorted out with help. Conditions can be treated. You can work on thoughts and strategies that serve you well.  This information is not intended to replace advice given to you by your health care provider. Make sure you discuss any questions you have with your health care provider.  Document Released: 06/23/2004 Document Revised: 08/16/2017 Document Reviewed: 04/14/2015  [employer] Interactive Patient Education © 2017 Elsevier Inc.

## 2018-09-22 NOTE — PROGRESS NOTES
Patient given discharge information, prescriptions, and follow-up care instructions. Patient voices no questions or concerns regarding discharge instructions, and has all appropriate equipment necessary. IV and tele monitor removed. Patient self-ambulatory to exit.

## 2018-09-26 LAB — EKG IMPRESSION: NORMAL

## 2018-10-05 ENCOUNTER — HOSPITAL ENCOUNTER (EMERGENCY)
Facility: MEDICAL CENTER | Age: 33
End: 2018-10-05
Attending: EMERGENCY MEDICINE
Payer: COMMERCIAL

## 2018-10-05 VITALS
HEART RATE: 91 BPM | RESPIRATION RATE: 18 BRPM | WEIGHT: 166.23 LBS | BODY MASS INDEX: 28.38 KG/M2 | DIASTOLIC BLOOD PRESSURE: 84 MMHG | SYSTOLIC BLOOD PRESSURE: 121 MMHG | TEMPERATURE: 97.2 F | OXYGEN SATURATION: 99 % | HEIGHT: 64 IN

## 2018-10-05 DIAGNOSIS — S61.412A LACERATION OF LEFT HAND WITHOUT FOREIGN BODY, INITIAL ENCOUNTER: ICD-10-CM

## 2018-10-05 PROCEDURE — 90715 TDAP VACCINE 7 YRS/> IM: CPT | Performed by: EMERGENCY MEDICINE

## 2018-10-05 PROCEDURE — 303747 HCHG EXTRA SUTURE

## 2018-10-05 PROCEDURE — 304217 HCHG IRRIGATION SYSTEM

## 2018-10-05 PROCEDURE — 304999 HCHG REPAIR-SIMPLE/INTERMED LEVEL 1

## 2018-10-05 PROCEDURE — 90471 IMMUNIZATION ADMIN: CPT

## 2018-10-05 PROCEDURE — 99283 EMERGENCY DEPT VISIT LOW MDM: CPT

## 2018-10-05 PROCEDURE — A6403 STERILE GAUZE>16 <= 48 SQ IN: HCPCS

## 2018-10-05 PROCEDURE — 700111 HCHG RX REV CODE 636 W/ 250 OVERRIDE (IP): Performed by: EMERGENCY MEDICINE

## 2018-10-05 RX ADMIN — CLOSTRIDIUM TETANI TOXOID ANTIGEN (FORMALDEHYDE INACTIVATED), CORYNEBACTERIUM DIPHTHERIAE TOXOID ANTIGEN (FORMALDEHYDE INACTIVATED), BORDETELLA PERTUSSIS TOXOID ANTIGEN (GLUTARALDEHYDE INACTIVATED), BORDETELLA PERTUSSIS FILAMENTOUS HEMAGGLUTININ ANTIGEN (FORMALDEHYDE INACTIVATED), BORDETELLA PERTUSSIS PERTACTIN ANTIGEN, AND BORDETELLA PERTUSSIS FIMBRIAE 2/3 ANTIGEN 0.5 ML: 5; 2; 2.5; 5; 3; 5 INJECTION, SUSPENSION INTRAMUSCULAR at 22:57

## 2018-10-05 ASSESSMENT — PAIN SCALES - GENERAL
PAINLEVEL_OUTOF10: 2
PAINLEVEL_OUTOF10: 2

## 2018-10-06 NOTE — ED NOTES
Dc instructions given to pt at this time. Instructed to f/u with pcp for suture removal. To follow up with YU[facility]KA as well. Return conditions explained. Pt denies questions. Ambulated out of ED with

## 2018-10-06 NOTE — ED PROVIDER NOTES
"ED Provider Note    CHIEF COMPLAINT  Chief Complaint   Patient presents with   • Hand Laceration       HPI  Franchesca Ortez is a 33 y.o. female who presents to the emergency department complaining of finger laceration.  She explains that she was cutting a candle with a steak knife.  The knife slipped cutting her left index finger.  She is right-hand dominant.  She does have slight pain with range of motion all she is able.  No numbness or tingling.  No other injuries.  Unknown last tetanus believed to be greater than 10 years ago.  Current only medication is naltrexone and Abilify.    REVIEW OF SYSTEMS  See HPI for further details. All other systems are negative.     PAST MEDICAL HISTORY   has a past medical history of Alcohol abuse; Depression; and History of suicide attempt.    SOCIAL HISTORY  Social History     Social History Main Topics   • Smoking status: Current Every Day Smoker     Packs/day: 0.50     Years: 15.00     Types: Cigarettes   • Smokeless tobacco: Never Used      Comment: 3/4 pk a day   • Alcohol use No   • Drug use: No   • Sexual activity: Yes     Partners: Male     Birth control/ protection: IUD       SURGICAL HISTORY   has a past surgical history that includes gyn surgery and primary c section.    CURRENT MEDICATIONS  Home Medications     Reviewed by Vinicio Bird R.N. (Registered Nurse) on 10/05/18 at 2052  Med List Status: Partial   Medication Last Dose Status   ARIPiprazole (ABILIFY) 5 MG tablet  Active   naltrexone (DEPADE) 50 MG Tab  Active   pregabalin (LYRICA) 75 MG Cap 9/17/2018 Active                ALLERGIES  No Known Allergies    PHYSICAL EXAM  VITAL SIGNS: /84   Pulse 91   Temp 36.2 °C (97.2 °F)   Resp 18   Ht 1.626 m (5' 4\")   Wt 75.4 kg (166 lb 3.6 oz)   SpO2 99%   BMI 28.53 kg/m²  @GABRIEL[[id number]::@  Pulse ox interpretation: I interpret this pulse ox as normal.  Constitutional: Alert in no apparent distress.  HENT: Normocephalic, Atraumatic, Bilateral external ears " normal. Nose normal.   Eyes: Pupils are equal and reactive. Conjunctiva normal, non-icteric.   Lungs: No respiratory distress  Skin: Warm, Dry, No erythema, No rash.   Left index finger: Inverted V-shaped laceration to the base of the index finger on the palmar and ulnar aspect.  There is a thin tissue bridge that is uncut distally.  With the larger V in  proximally.  Total length approximately 3 cm.  No active bleeding.  No evidence of deep structure involvement.  Neurologic: Alert, Grossly non-focal.   Psychiatric: Affect normal, Judgment normal, Mood normal, Appears appropriate and not intoxicated.         Laceration repair: Digital block completed with 2% lidocaine without epinephrine after cleansing with alcohol prep.  Patient then irrigated with 500 cc of normal saline.  Patient prepped and draped in sterile fashion.  Patient sutured in simple interrupted fashion with 6 6-0 nylon sutures.  Bandage was placed.  Distal neurovascular motor intact.  No complications.  Patient tolerated well.      COURSE & MEDICAL DECISION MAKING  Pertinent Labs & Imaging studies reviewed. (See chart for details)  Patient presenting to the emergency department with finger laceration.  Please see procedure note as above.  Patient is understanding of ongoing outpatient wound care and need for suture removal.  Additionally she has been referred to [place] [facility] for further evaluation by hand specialist for reevaluation to reevaluate and rule out any further deep structure involvement although again clinically currently I do not see any evidence of deep structure disruption and she did not have any physical exam findings to suggest otherwise.      The patient will return for worsening symptoms and is stable at the time of discharge. The patient verbalizes understanding and will comply.    FINAL IMPRESSION  1. Laceration of left hand without foreign body, initial encounter               Electronically signed by: Dexter CORBETT  Polo, 10/5/2018 10:04 PM

## 2018-10-10 ENCOUNTER — OFFICE VISIT (OUTPATIENT)
Dept: MEDICAL GROUP | Facility: MEDICAL CENTER | Age: 33
End: 2018-10-10
Payer: COMMERCIAL

## 2018-10-10 VITALS
HEART RATE: 83 BPM | DIASTOLIC BLOOD PRESSURE: 68 MMHG | WEIGHT: 166.89 LBS | BODY MASS INDEX: 27.81 KG/M2 | OXYGEN SATURATION: 97 % | SYSTOLIC BLOOD PRESSURE: 110 MMHG | HEIGHT: 65 IN | TEMPERATURE: 98 F

## 2018-10-10 DIAGNOSIS — M79.7 FIBROMYALGIA: ICD-10-CM

## 2018-10-10 DIAGNOSIS — Z91.51 HX OF SUICIDE ATTEMPT: ICD-10-CM

## 2018-10-10 DIAGNOSIS — F32.A DEPRESSION, UNSPECIFIED DEPRESSION TYPE: ICD-10-CM

## 2018-10-10 DIAGNOSIS — F10.10 ETOH ABUSE: ICD-10-CM

## 2018-10-10 PROBLEM — E87.20 METABOLIC ACIDOSIS: Status: RESOLVED | Noted: 2018-09-19 | Resolved: 2018-10-10

## 2018-10-10 PROBLEM — E87.1 HYPONATREMIA: Status: RESOLVED | Noted: 2018-09-19 | Resolved: 2018-10-10

## 2018-10-10 PROCEDURE — 99214 OFFICE O/P EST MOD 30 MIN: CPT | Performed by: PHYSICIAN ASSISTANT

## 2018-10-10 RX ORDER — ARIPIPRAZOLE 5 MG/1
5 TABLET ORAL DAILY
Qty: 90 TAB | Refills: 0 | Status: SHIPPED | OUTPATIENT
Start: 2018-10-10 | End: 2022-06-13

## 2018-10-10 RX ORDER — PREGABALIN 75 MG/1
75 CAPSULE ORAL 2 TIMES DAILY
Qty: 180 CAP | Refills: 1 | Status: SHIPPED | OUTPATIENT
Start: 2018-10-10 | End: 2019-01-08

## 2018-10-10 RX ORDER — NALTREXONE HYDROCHLORIDE 50 MG/1
50 TABLET, FILM COATED ORAL DAILY
Qty: 90 TAB | Refills: 1 | Status: SHIPPED | OUTPATIENT
Start: 2018-10-10 | End: 2022-06-13

## 2018-10-10 NOTE — PROGRESS NOTES
"Subjective:   CC:   Chief Complaint   Patient presents with   • Hospital Follow-up     Med refill on Naltrexone, Lyrica and abilify   • Finger Injury     Wound check       Franchesca Nancy Ortez is a 33 y.o. female here today for hospital follow-up after suicide attempt    Patient has history of depression and previous suicide attempt.  Her first suicide attempt was 2 years ago when she was a started on Cymbalta.  This time she committed suicide while she was on Lexapro she took Tylenol.  Patient has history of alcohol abuse.  States she does not drink during the week however on weekends when she drinks she drinks a pint of vodka or a 750 mL bottle.  In hospital she was started on naltrexone.  She is also taking Abilify 5 mg daily and Lexapro has discontinued.  She is under care of pain management for fibromyalgia.  She has been taking Lyrica which has helped with her symptoms.  She was previously on tramadol however it was discontinued        Current medicines (including changes today)  Current Outpatient Prescriptions   Medication Sig Dispense Refill   • aripiprazole (ABILIFY) 5 MG tablet Take 1 Tab by mouth every day. 90 Tab 0   • naltrexone (DEPADE) 50 MG Tab Take 1 Tab by mouth every day. 90 Tab 1   • pregabalin (LYRICA) 75 MG Cap Take 1 Cap by mouth 2 times a day for 90 days. 180 Cap 1     No current facility-administered medications for this visit.          Past medical, surgical, family, and social history are reviewed in Epic chart by me today.   Medications and allergies reviewed in Epic chart by me today.         ROS   No chest pain, no shortness of breath, no abdominal pain  As documented in history of present illness above     Objective:     Blood pressure 110/68, pulse 83, temperature 36.7 °C (98 °F), height 1.638 m (5' 4.5\"), weight 75.7 kg (166 lb 14.2 oz), last menstrual period 01/01/2013, SpO2 97 %, not currently breastfeeding. Body mass index is 28.2 kg/m².   Physical Exam:  Constitutional: Alert, " oriented in no acute distress.  Psych: Eye contact is good, speech goal directed, affect calm  Lungs: Unlabored respiratory effort, clear to auscultation bilaterally with good excursion, no wheez or rhonci  CV: regular rate and rhythm. No lower extremity edema            Assessment and Plan:   The following treatment plan was discussed    1. Hx of suicide attempt  Advised pt to see behavioral health    2. Depression, unspecified depression type  - aripiprazole (ABILIFY) 5 MG tablet; Take 1 Tab by mouth every day.  Dispense: 90 Tab; Refill: 0    3. Fibromyalgia  - pregabalin (LYRICA) 75 MG Cap; Take 1 Cap by mouth 2 times a day for 90 days.  Dispense: 180 Cap; Refill: 1    4. ETOH abuse  - naltrexone (DEPADE) 50 MG Tab; Take 1 Tab by mouth every day.  Dispense: 90 Tab; Refill: 1      Followup: Return if symptoms worsen or fail to improve.         Please note that this dictation was created using voice recognition software. I have made every reasonable attempt to correct obvious errors, but I expect that there are errors of grammar and possibly content that I did not discover before finalizing the note.

## 2018-10-15 NOTE — PSYCHIATRY
"PSYCHIATRIC FOLLOW UP:    Reason for Admission: Tylenol overdose  Psychiatric Supervising Attending:     Momo      HPI:       Pt is a 32 y/o woman with a history of ETOH use disorder and unspecified mood disorder, doing much better on abilify. Seen with her and her  today. She and her  have decided not to separate. The separation was going to be in part because it allowed her to avoid treatment for her alcoholism, per her report. Franchesca is much more open today and more willing to explore her own control strategies. In session with  she became strongly defensive once. She was able to tolerate this being pointed out, and was able to develop some flexibility around this reaction and where it was coming from.     She denies si. She states her mood is anxious, but much much better. She feels much more confident going forward.     Over 45 minutes spent in counseling. Pt participated in session focused on increasing psychological flexibility. Discussed 12 steps from an ACT perspective. Discussed unmanageability, powerlessness, and \"turning things over\" in the context of struggling with and learning to let go of control strategies. Also discussed how people often move away from their values when they are responding to uncomfortable thoughts, feelings, and emotions, and invited them to work on moving toward values even when these averse experiences are present.          Psychiatric ExaminationTemp:  [36.7 °C (98.1 °F)-36.8 °C (98.3 °F)] 36.8 °C (98.3 °F)  Pulse:  [71-78] 78  Resp:  [16-18] 16  BP: (109-124)/(71-90) 124/77    Musculoskeletal no psychomotor agitation or retardation   Appearance:Grooming wnl   Thoughts:Logical and sequential, goal-directed   No a/vh, no evidence of delusions, no ideas of reference, no internal stimulation noted   Speech:Nl tone, rate, and volume. Not pressured. Understandable.   Mood:    \"better\"  Affect:    Full and congruent   SI/HI:   Denies "   Attention/Alertness:   Awake, alert   Memory:   Grossly intact.   Orientation:    Grossly intact.   Fund of Knowledge:    Grossly intact.   Cognition:  Insight/Judgement into symptoms  Neurological Testing:    Medical systems reviewed:     Denies pain  Denies n/v  Denies sob, denies cp  No ha  All other systems reviewed and are negative.     Lab results/tests:        None new    Assessment:  Mood disorder unspecified  Alcohol use disorder           Plan:  Pt much improved.   D/c home on abilify 5mg po daily  Naltrexone 50mg po daily    No more tramadol as outpatient due to naltrexone start.   F/u with therapy as outpatient as discussed.       Over 45 minutes spent in counseling. Pt participated in session focused on increasing psychological flexibility.   Signing off, thank you for allowing me to participate in the care of this patient.

## 2018-10-17 ENCOUNTER — OFFICE VISIT (OUTPATIENT)
Dept: MEDICAL GROUP | Facility: MEDICAL CENTER | Age: 33
End: 2018-10-17
Payer: COMMERCIAL

## 2018-10-17 VITALS
SYSTOLIC BLOOD PRESSURE: 110 MMHG | HEART RATE: 104 BPM | BODY MASS INDEX: 27.52 KG/M2 | TEMPERATURE: 98 F | RESPIRATION RATE: 16 BRPM | HEIGHT: 65 IN | OXYGEN SATURATION: 96 % | WEIGHT: 165.2 LBS | DIASTOLIC BLOOD PRESSURE: 72 MMHG

## 2018-10-17 DIAGNOSIS — S61.211S LACERATION OF LEFT INDEX FINGER WITHOUT FOREIGN BODY WITHOUT DAMAGE TO NAIL, SEQUELA: ICD-10-CM

## 2018-10-17 DIAGNOSIS — F10.10 ETOH ABUSE: ICD-10-CM

## 2018-10-17 DIAGNOSIS — Z30.431 ENCOUNTER FOR ROUTINE CHECKING OF INTRAUTERINE CONTRACEPTIVE DEVICE (IUD): ICD-10-CM

## 2018-10-17 DIAGNOSIS — Z91.51 HX OF SUICIDE ATTEMPT: ICD-10-CM

## 2018-10-17 DIAGNOSIS — M79.7 FIBROMYALGIA: ICD-10-CM

## 2018-10-17 DIAGNOSIS — F33.42 RECURRENT MAJOR DEPRESSIVE DISORDER, IN FULL REMISSION (HCC): ICD-10-CM

## 2018-10-17 PROBLEM — S06.2XAA LACERATION AND CONTUSION OF CEREBRAL CORTEX (HCC): Status: ACTIVE | Noted: 2018-10-17

## 2018-10-17 PROBLEM — S61.211A LACERATION OF LEFT INDEX FINGER WITHOUT FOREIGN BODY WITHOUT DAMAGE TO NAIL: Status: ACTIVE | Noted: 2018-10-17

## 2018-10-17 PROCEDURE — 99215 OFFICE O/P EST HI 40 MIN: CPT | Performed by: FAMILY MEDICINE

## 2018-10-17 RX ORDER — AMOXICILLIN AND CLAVULANATE POTASSIUM 875; 125 MG/1; MG/1
TABLET, FILM COATED ORAL
COMMUNITY
Start: 2018-08-06 | End: 2018-10-17

## 2018-10-17 RX ORDER — IBUPROFEN 800 MG/1
TABLET ORAL
COMMUNITY
Start: 2018-08-06 | End: 2018-10-17

## 2018-10-17 RX ORDER — CYCLOBENZAPRINE HCL 5 MG
TABLET ORAL
COMMUNITY
Start: 2018-07-30 | End: 2018-10-17

## 2018-10-17 NOTE — ASSESSMENT & PLAN NOTE
Has mirena IUD for just over 5 years,   I explain that technically at this time FDA has not approved mirena for more than 5 years so my formal recommendation is that she use back up method (condoms or pills) until she is able to schedule with ob/gyn for removal reinsertion

## 2018-10-18 NOTE — ASSESSMENT & PLAN NOTE
She is taking abilify which is advanced management   I am not comfortable managing antipsychotic for depression   She additionally was recently hospitalized for suicide attempt and has failed SSRI   She should have urgent post hospital follow up with psychiatry

## 2018-10-18 NOTE — PROGRESS NOTES
This medical record contains text that has been entered with the assistance of computer voice recognition and dictation software.  Therefore, it may contain unintended errors in text, spelling, punctuation, or grammar        Chief Complaint   Patient presents with   • Other     PAP? IUD  in 2018   • Other     stitch removal LT index finger       Franchesca Ortez is a 33 y.o. female here evaluation and management of: referral to me by PA for advanced care as MD of h/o suicide attempt depression recent hospitalization for suicide attempt, management of contraception and etoh abuse in remission    Patient also had laceration repair at ER would like stiches removed today if possible       Contraception has mirena IUD interested in continuing with LARC contraception no vaginal bleeding she is amenorrheic she is sexually active and monogamous with her  and they use condoms this year   Depression - currently not SI/HI and denies AVH, feels better with current medications   H/o etoh abuse in remission taking naltrexone and therefore has stopped opiate use for fibromyalgia as well as ETOH     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 90 Tab 0   • naltrexone (DEPADE) 50 MG Tab Take 1 Tab by mouth every day. 90 Tab 1   • pregabalin (LYRICA) 75 MG Cap Take 1 Cap by mouth 2 times a day for 90 days. 180 Cap 1     No current facility-administered medications for this visit.      Patient Active Problem List    Diagnosis Date Noted   • Hx of suicide attempt 2018     Priority: Medium   • ETOH abuse 2016     Priority: Low   • Encounter for routine checking of intrauterine contraceptive device (IUD) 10/17/2018   • Laceration and contusion of cerebral cortex (HCC) 10/17/2018   • Laceration of left index finger without foreign body without damage to nail 10/17/2018   •  "Fibromyalgia 10/10/2018   • Tylenol overdose, intentional self-harm, initial encounter ([facility]) 09/18/2018   • Alcohol intoxication ([facility]) 09/18/2018   • Tobacco abuse 05/30/2016   • Depression 05/29/2016     Past Surgical History:   Procedure Laterality Date   • GYN SURGERY      c section   • PRIMARY C SECTION        Social History   Substance Use Topics   • Smoking status: Current Every Day Smoker     Packs/day: 0.50     Years: 15.00     Types: Cigarettes   • Smokeless tobacco: Never Used      Comment: 3/4 pk a day   • Alcohol use No     Family History   Problem Relation Age of Onset   • Hypertension Father    • Diabetes Father    • Cancer Maternal Grandmother         liver           ROS    all review of system completed and negative except for those listed above     Objective:     Blood pressure 110/72, pulse (!) 104, temperature 36.7 °C (98 °F), temperature source Temporal, resp. rate 16, height 1.638 m (5' 4.5\"), weight 74.9 kg (165 lb 3.2 oz), SpO2 96 %, not currently breastfeeding. Body mass index is 27.92 kg/m².  Physical Exam:  For me pulse is 60's       GEN: comfortable, alert and oriented, well nourished, well developed, in no apparent distress   HEENT: NCAT, eyes: pupils equal and reactive, sclera white, EOMIT, good dentition  HEART: limbs warm and well perfused, regular rate, no JVD, no lower extremity edema  LUNGS: speaking in full sentences, not in apparent respiratory distress, no audible wheezes  MSK: normal tone and bulk, no swelling of the joints, gait steady and normal   Skin - she has laceration of her index finger well approximated no e/o infection no redness good scab formation       Assessment and Plan:   The following treatment plan was discussed        Problem List Items Addressed This Visit     Hx of suicide attempt    Relevant Orders    REFERRAL TO PSYCHIATRY    Depression     She is taking abilify which is advanced management   I am not comfortable managing antipsychotic for depression   She " additionally was recently hospitalized for suicide attempt and has failed SSRI   She should have urgent post hospital follow up with psychiatry                Relevant Orders    REFERRAL TO PSYCHIATRY    Fibromyalgia    Encounter for routine checking of intrauterine contraceptive device (IUD)     Has mirena IUD for just over 5 years,   I explain that technically at this time FDA has not approved mirena for more than 5 years so my formal recommendation is that she use back up method (condoms or pills) until she is able to schedule with ob/gyn for removal reinsertion               Relevant Orders    REFERRAL TO OB/GYN    Laceration of left index finger without foreign body without damage to nail     No e/o infection   All visible sutures removed today               ETOH abuse     She is taking naltrexone  I am not comfortable managing this medication as I do not have a lot of experience  Ref to psychiatry   And also community etoh abuse resources provided                     Over 40 minutes spent with patient face to face, greater than 50% time spent with plan/coordination of care regarding that which is discussed in the HPI and A&P.      Instructed to follow up if symptoms worsen or fail to improve, ER/UC precautions discussed as well    Negra Cool MD  [facility], Family Medicine   [address] [place] [address]   Akash MELÉNDEZ [address]  Phone: [phone]

## 2018-10-18 NOTE — ASSESSMENT & PLAN NOTE
She is taking naltrexone  I am not comfortable managing this medication as I do not have a lot of experience  Ref to psychiatry   And also community etoh abuse resources provided

## 2020-01-15 ENCOUNTER — APPOINTMENT (OUTPATIENT)
Dept: URGENT CARE | Facility: CLINIC | Age: 35
End: 2020-01-15
Payer: COMMERCIAL

## 2020-01-15 ENCOUNTER — NON-PROVIDER VISIT (OUTPATIENT)
Dept: URGENT CARE | Facility: CLINIC | Age: 35
End: 2020-01-15
Payer: COMMERCIAL

## 2020-01-15 DIAGNOSIS — Z23 NEED FOR VACCINATION: ICD-10-CM

## 2020-01-15 PROCEDURE — 90471 IMMUNIZATION ADMIN: CPT | Performed by: FAMILY MEDICINE

## 2020-01-15 PROCEDURE — 90686 IIV4 VACC NO PRSV 0.5 ML IM: CPT | Performed by: FAMILY MEDICINE

## 2020-09-03 ENCOUNTER — NON-PROVIDER VISIT (OUTPATIENT)
Dept: OCCUPATIONAL MEDICINE | Facility: CLINIC | Age: 35
End: 2020-09-03

## 2020-09-03 DIAGNOSIS — Z02.89 ENCOUNTER FOR OCCUPATIONAL HEALTH EXAMINATION: Primary | ICD-10-CM

## 2020-09-03 PROCEDURE — 86580 TB INTRADERMAL TEST: CPT | Performed by: NURSE PRACTITIONER

## 2020-09-04 ENCOUNTER — OFFICE VISIT (OUTPATIENT)
Dept: OCCUPATIONAL MEDICINE | Facility: CLINIC | Age: 35
End: 2020-09-04

## 2020-09-04 DIAGNOSIS — Z02.1 PRE-EMPLOYMENT HEALTH SCREENING EXAMINATION: ICD-10-CM

## 2020-09-04 PROCEDURE — 8915 PR COMPREHENSIVE PHYSICAL: Performed by: NURSE PRACTITIONER

## 2020-09-06 ENCOUNTER — NON-PROVIDER VISIT (OUTPATIENT)
Dept: URGENT CARE | Facility: CLINIC | Age: 35
End: 2020-09-06

## 2020-09-08 LAB — TB WHEAL 3D P 5 TU DIAM: NORMAL MM

## 2020-09-15 ENCOUNTER — NON-PROVIDER VISIT (OUTPATIENT)
Dept: OCCUPATIONAL MEDICINE | Facility: CLINIC | Age: 35
End: 2020-09-15

## 2020-09-15 DIAGNOSIS — Z11.1 ENCOUNTER FOR PPD TEST: ICD-10-CM

## 2020-09-15 PROCEDURE — 86580 TB INTRADERMAL TEST: CPT | Performed by: PREVENTIVE MEDICINE

## 2020-09-17 ENCOUNTER — NON-PROVIDER VISIT (OUTPATIENT)
Dept: OCCUPATIONAL MEDICINE | Facility: CLINIC | Age: 35
End: 2020-09-17

## 2020-09-17 LAB — TB WHEAL 3D P 5 TU DIAM: NORMAL MM

## 2022-01-25 NOTE — ED NOTES
Sarah from Lab called with critical result of Acetaminophen level 152 at 1534. Critical lab result read back to Sarah.   Dr. Dinero notified of critical lab result at 1534.  Critical lab result read back by Dr. Dinero.   Pt is a 75 y/o male admitted to [facility] on 1/24/22  c/o R aching shoulder pain, hx of lung cancer with metastatic lesions to the right proximal humerus. Pt is s/p R proximal humerus ORIF on 1/24/22,

## 2022-02-10 ENCOUNTER — HOSPITAL ENCOUNTER (EMERGENCY)
Facility: MEDICAL CENTER | Age: 37
End: 2022-02-10
Attending: EMERGENCY MEDICINE

## 2022-02-10 VITALS
HEART RATE: 75 BPM | OXYGEN SATURATION: 99 % | TEMPERATURE: 96.6 F | RESPIRATION RATE: 18 BRPM | WEIGHT: 165 LBS | BODY MASS INDEX: 28.17 KG/M2 | SYSTOLIC BLOOD PRESSURE: 138 MMHG | HEIGHT: 64 IN | DIASTOLIC BLOOD PRESSURE: 72 MMHG

## 2022-02-10 DIAGNOSIS — S61.011A LACERATION OF RIGHT THUMB WITHOUT FOREIGN BODY WITHOUT DAMAGE TO NAIL, INITIAL ENCOUNTER: ICD-10-CM

## 2022-02-10 DIAGNOSIS — S61.411A LACERATION OF RIGHT HAND WITHOUT FOREIGN BODY, INITIAL ENCOUNTER: ICD-10-CM

## 2022-02-10 PROCEDURE — 99282 EMERGENCY DEPT VISIT SF MDM: CPT

## 2022-02-10 PROCEDURE — 304217 HCHG IRRIGATION SYSTEM

## 2022-02-10 PROCEDURE — 700111 HCHG RX REV CODE 636 W/ 250 OVERRIDE (IP): Performed by: EMERGENCY MEDICINE

## 2022-02-10 PROCEDURE — 303353 HCHG DERMABOND SKIN ADHESIVE

## 2022-02-10 PROCEDURE — 90471 IMMUNIZATION ADMIN: CPT

## 2022-02-10 PROCEDURE — 90715 TDAP VACCINE 7 YRS/> IM: CPT | Performed by: EMERGENCY MEDICINE

## 2022-02-10 PROCEDURE — 304999 HCHG REPAIR-SIMPLE/INTERMED LEVEL 1

## 2022-02-10 RX ADMIN — CLOSTRIDIUM TETANI TOXOID ANTIGEN (FORMALDEHYDE INACTIVATED), CORYNEBACTERIUM DIPHTHERIAE TOXOID ANTIGEN (FORMALDEHYDE INACTIVATED), BORDETELLA PERTUSSIS TOXOID ANTIGEN (GLUTARALDEHYDE INACTIVATED), BORDETELLA PERTUSSIS FILAMENTOUS HEMAGGLUTININ ANTIGEN (FORMALDEHYDE INACTIVATED), BORDETELLA PERTUSSIS PERTACTIN ANTIGEN, AND BORDETELLA PERTUSSIS FIMBRIAE 2/3 ANTIGEN 0.5 ML: 5; 2; 2.5; 5; 3; 5 INJECTION, SUSPENSION INTRAMUSCULAR at 03:58

## 2022-02-10 NOTE — ED TRIAGE NOTES
Chief Complaint   Patient presents with   • Hand Laceration     Pt has lac to right hand. Per patient she was washing dishes when one broke. Denies SI or assault. Unknown tetanus. CMS intact. Bleeding controlled        Pt amb to triage with steady gait for above complaint.   Pt is alert and oriented, speaking in full sentences, follows commands and responds appropriately to questions. Resp are even and unlabored. No obvious acute distress.    Pt educated on triage process. Pt encouraged to alert staff for any changes.    Vitals:    02/10/22 0309   BP: 142/92   Pulse: 80   Resp: 18   Temp: 35.9 °C (96.6 °F)   SpO2: 99%

## 2022-02-10 NOTE — ED PROVIDER NOTES
"ER Provider Note     Scribed for Giovanni Vera M.D. by Akhil Gonzáles. 2/10/2022, 3:32 AM.    Primary Care Provider: No primary care provider noted.  Means of Arrival: Walk-In   History obtained from: Patient  History limited by: None     CHIEF COMPLAINT  Chief Complaint   Patient presents with    Hand Laceration     Pt has lac to right hand. Per patient she was washing dishes when one broke. Denies SI or assault. Unknown tetanus. CMS intact. Bleeding controlled        HPI  Franchesca Ortez is a 36 y.o. female who presents to the Emergency Department for evaluation of a laceration to the right hand. She reports washing a glass cup last night when a small piece broke off, cutting her hand. She endorses associated bleeding, but denies any associated pain. She is unsure of her tetanus vaccination status.    REVIEW OF SYSTEMS  See HPI for further details. All other systems are negative. C.    PAST MEDICAL HISTORY   has a past medical history of Alcohol abuse, Depression, and History of suicide attempt.    SURGICAL HISTORY   has a past surgical history that includes gyn surgery and primary c section.    SOCIAL HISTORY  Social History     Tobacco Use    Smoking status: Current Every Day Smoker     Packs/day: 0.50     Years: 15.00     Pack years: 7.50     Types: Cigarettes    Smokeless tobacco: Never Used    Tobacco comment: 3/4 pk a day   Substance Use Topics    Alcohol use: No    Drug use: No      Social History     Substance and Sexual Activity   Drug Use No       FAMILY HISTORY  Family History   Problem Relation Age of Onset    Hypertension Father     Diabetes Father     Cancer Maternal Grandmother         liver       CURRENT MEDICATIONS  Home Medications    **Home medications have not yet been reviewed for this encounter**         ALLERGIES  No Known Allergies    PHYSICAL EXAM  VITAL SIGNS: /92   Pulse 80   Temp 35.9 °C (96.6 °F) (Temporal)   Resp 18   Ht 1.626 m (5' 4\")   Wt 74.8 kg (165 lb)   SpO2 " 99% Comment: Room air  BMI 28.32 kg/m²      Constitutional: Alert in no apparent distress.  HENT: No signs of trauma, Bilateral external ears normal, Nose normal.   Eyes: Pupils are equal and reactive, Conjunctiva normal, Non-icteric.   Neck: Normal range of motion, No tenderness, Supple, No stridor.   Lymphatic: No lymphadenopathy noted.   Cardiovascular: Regular rate and rhythm, no palpable thrill  Thorax & Lungs: No respiratory distress,  No chest tenderness.  CTAB  Abdomen: Bowel sounds normal, Soft, No tenderness, No masses, No pulsatile masses. No peritoneal signs.  Skin: Laceration flap over the base of the right thumb and base of the right index finger, 1 cm and 2 cm respectively. Warm, Dry, No erythema, No rash.   Back: No bony tenderness, No CVA tenderness.   Extremities: Intact distal pulses, No edema, No tenderness, No cyanosis.  Musculoskeletal: Good range of motion in all major joints. No tenderness to palpation or major deformities noted.   Neurologic: Alert , Normal motor function, Normal sensory function, No focal deficits noted.  Psychiatric: Affect normal, Judgment normal, Mood normal.    Laceration Repair Procedure    Indication: Laceration    Location/Description: Base of the right thumb and base of the right index finger.    Procedure: The patient was placed in the appropriate position and anesthesia around the lacerations were not performed at the patient's request. The area was then cleansed using tap water. The laceration was closed with Dermabond. A second laceration was closed with Dermabond.      Total repaired wound length: 3 cm.     Other Items: None    The patient tolerated the procedure well.    Complications: None    COURSE & MEDICAL DECISION MAKING  This is a 36 y.o. female that presents with a laceration to the base of the right thumb and the base of the right index finger.     3:32 AM - Patient seen and examined at bedside. Discussed options to close the wound with glue or  stitches. Patient requested lacerations be repaired with glue. Patient will be medicated with Acadel 0.5 mL injection for her symptoms.     3:47 AM - I reevaluated the patient at bedside. Laceration repair performed by me as described above. I discussed plan for discharge and follow up as outlined below. The patient verbalizes they feel comfortable going home. The patient is stable for discharge at this time and will return for any new or worsening symptoms. Patient verbalizes understanding and support with my plan for discharge.      The patient is referred to a primary physician for blood pressure management, diabetic screening, and for all other preventative health concerns.    DISPOSITION:  Patient will be discharged home in stable condition.    FOLLOW UP:  [facility]  [address] [facility] [address]  [phone]    FINAL IMPRESSION  1. Laceration of right hand without foreign body, initial encounter    2. Laceration of right thumb without foreign body without damage to nail, initial encounter     Laceration Repair Procedure     Akhil AVERY (Yumiko), am scribing for, and in the presence of, Giovanni Vera M.D..    Electronically signed by: Akhil Gonzáles (Yumiko), 2/10/2022    IGiovanni M.D. personally performed the services described in this documentation, as scribed by Akhil Gonzáles in my presence, and it is both accurate and complete. C.    The note accurately reflects work and decisions made by me.  Giovanni Vera M.D.  2/10/2022  5:30 AM

## 2022-02-10 NOTE — ED NOTES
Patient seen by ERP in triage. Wound cleansed and dermabond applied by ERP. Tetanus shot given. Patient educated on wound care, verbalized understanding

## 2022-02-10 NOTE — ED NOTES
Patient educated on discharge instructions and home care. Patient verbalized understanding. Patient ambulated to [facility].

## 2022-06-13 ENCOUNTER — APPOINTMENT (OUTPATIENT)
Dept: RADIOLOGY | Facility: MEDICAL CENTER | Age: 37
DRG: 894 | End: 2022-06-13
Attending: EMERGENCY MEDICINE

## 2022-06-13 ENCOUNTER — HOSPITAL ENCOUNTER (INPATIENT)
Facility: MEDICAL CENTER | Age: 37
LOS: 4 days | DRG: 894 | End: 2022-06-17
Attending: EMERGENCY MEDICINE | Admitting: STUDENT IN AN ORGANIZED HEALTH CARE EDUCATION/TRAINING PROGRAM

## 2022-06-13 DIAGNOSIS — I60.9 SUBARACHNOID HEMORRHAGE (HCC): ICD-10-CM

## 2022-06-13 DIAGNOSIS — F10.11 HISTORY OF ALCOHOL ABUSE: ICD-10-CM

## 2022-06-13 DIAGNOSIS — S01.512A LACERATION OF TONGUE, INITIAL ENCOUNTER: ICD-10-CM

## 2022-06-13 DIAGNOSIS — R56.9 SEIZURE (HCC): ICD-10-CM

## 2022-06-13 DIAGNOSIS — G40.901 STATUS EPILEPTICUS (HCC): ICD-10-CM

## 2022-06-13 PROBLEM — E87.6 HYPOKALEMIA: Status: ACTIVE | Noted: 2022-06-13

## 2022-06-13 PROBLEM — T79.6XXA TRAUMATIC RHABDOMYOLYSIS (HCC): Status: ACTIVE | Noted: 2022-06-13

## 2022-06-13 PROBLEM — G93.40 ACUTE ENCEPHALOPATHY: Status: ACTIVE | Noted: 2022-06-13

## 2022-06-13 PROBLEM — R45.1 AGITATION: Status: ACTIVE | Noted: 2022-06-13

## 2022-06-13 PROBLEM — R79.89 ELEVATED LFTS: Status: ACTIVE | Noted: 2022-06-13

## 2022-06-13 PROBLEM — R73.9 HYPERGLYCEMIA: Status: ACTIVE | Noted: 2022-06-13

## 2022-06-13 PROBLEM — F10.929 ALCOHOL INTOXICATION (HCC): Status: RESOLVED | Noted: 2018-09-18 | Resolved: 2022-06-13

## 2022-06-13 PROBLEM — F10.939 ALCOHOL WITHDRAWAL (HCC): Status: ACTIVE | Noted: 2022-06-13

## 2022-06-13 LAB
ABO + RH BLD: NORMAL
ABO GROUP BLD: NORMAL
ALBUMIN SERPL BCP-MCNC: 3.9 G/DL (ref 3.2–4.9)
ALBUMIN/GLOB SERPL: 1.1 G/DL
ALP SERPL-CCNC: 94 U/L (ref 30–99)
ALT SERPL-CCNC: 63 U/L (ref 2–50)
ANION GAP SERPL CALC-SCNC: 21 MMOL/L (ref 7–16)
ANISOCYTOSIS BLD QL SMEAR: ABNORMAL
APAP SERPL-MCNC: <5 UG/ML (ref 10–30)
AST SERPL-CCNC: 158 U/L (ref 12–45)
BASOPHILS # BLD AUTO: 0.9 % (ref 0–1.8)
BASOPHILS # BLD: 0.08 K/UL (ref 0–0.12)
BILIRUB SERPL-MCNC: 0.8 MG/DL (ref 0.1–1.5)
BLD GP AB SCN SERPL QL: NORMAL
BUN SERPL-MCNC: 10 MG/DL (ref 8–22)
CALCIUM SERPL-MCNC: 9.1 MG/DL (ref 8.5–10.5)
CFT BLD TEG: 5.8 MIN (ref 4.6–9.1)
CFT P HPASE BLD TEG: 5.6 MIN (ref 4.3–8.3)
CHLORIDE SERPL-SCNC: 100 MMOL/L (ref 96–112)
CHOLEST SERPL-MCNC: 168 MG/DL (ref 100–199)
CK SERPL-CCNC: 811 U/L (ref 0–154)
CLOT ANGLE BLD TEG: 74.8 DEGREES (ref 63–78)
CO2 SERPL-SCNC: 17 MMOL/L (ref 20–33)
COHGB MFR BLD: 2 % (ref 0–4.9)
CREAT SERPL-MCNC: 0.63 MG/DL (ref 0.5–1.4)
CT.EXTRINSIC BLD ROTEM: 1.1 MIN (ref 0.8–2.1)
EOSINOPHIL # BLD AUTO: 0.08 K/UL (ref 0–0.51)
EOSINOPHIL NFR BLD: 0.9 % (ref 0–6.9)
ERYTHROCYTE [DISTWIDTH] IN BLOOD BY AUTOMATED COUNT: 51.3 FL (ref 35.9–50)
ETHANOL BLD-MCNC: <10.1 MG/DL
FLUAV RNA SPEC QL NAA+PROBE: NEGATIVE
FLUBV RNA SPEC QL NAA+PROBE: NEGATIVE
GFR SERPLBLD CREATININE-BSD FMLA CKD-EPI: 118 ML/MIN/1.73 M 2
GLOBULIN SER CALC-MCNC: 3.5 G/DL (ref 1.9–3.5)
GLUCOSE SERPL-MCNC: 132 MG/DL (ref 65–99)
HCG SERPL QL: NEGATIVE
HCT VFR BLD AUTO: 32.8 % (ref 37–47)
HDLC SERPL-MCNC: 66 MG/DL
HGB BLD-MCNC: 11 G/DL (ref 12–16)
LDLC SERPL CALC-MCNC: 89 MG/DL
LG PLATELETS BLD QL SMEAR: NORMAL
LYMPHOCYTES # BLD AUTO: 0.83 K/UL (ref 1–4.8)
LYMPHOCYTES NFR BLD: 9.6 % (ref 22–41)
MACROCYTES BLD QL SMEAR: ABNORMAL
MANUAL DIFF BLD: NORMAL
MCF BLD TEG: 60.4 MM (ref 52–69)
MCF.PLATELET INHIB BLD ROTEM: 31.3 MM (ref 15–32)
MCH RBC QN AUTO: 35.5 PG (ref 27–33)
MCHC RBC AUTO-ENTMCNC: 33.5 G/DL (ref 33.6–35)
MCV RBC AUTO: 105.8 FL (ref 81.4–97.8)
MICROCYTES BLD QL SMEAR: ABNORMAL
MONOCYTES # BLD AUTO: 0.3 K/UL (ref 0–0.85)
MONOCYTES NFR BLD AUTO: 3.5 % (ref 0–13.4)
MORPHOLOGY BLD-IMP: NORMAL
NEUTROPHILS # BLD AUTO: 7.32 K/UL (ref 2–7.15)
NEUTROPHILS NFR BLD: 85.1 % (ref 44–72)
NRBC # BLD AUTO: 0 K/UL
NRBC BLD-RTO: 0 /100 WBC
PA AA BLD-ACNC: 97.5 % (ref 0–11)
PA ADP BLD-ACNC: 71.3 % (ref 0–17)
PLATELET # BLD AUTO: 132 K/UL (ref 164–446)
PLATELET BLD QL SMEAR: NORMAL
PMV BLD AUTO: 9.7 FL (ref 9–12.9)
POTASSIUM SERPL-SCNC: 3.5 MMOL/L (ref 3.6–5.5)
PROT SERPL-MCNC: 7.4 G/DL (ref 6–8.2)
RBC # BLD AUTO: 3.1 M/UL (ref 4.2–5.4)
RBC BLD AUTO: PRESENT
RH BLD: NORMAL
RSV RNA SPEC QL NAA+PROBE: NEGATIVE
SALICYLATES SERPL-MCNC: <1 MG/DL (ref 15–25)
SARS-COV-2 RNA RESP QL NAA+PROBE: NOTDETECTED
SODIUM SERPL-SCNC: 137 MMOL/L (ref 135–145)
SODIUM SERPL-SCNC: 138 MMOL/L (ref 135–145)
SODIUM SERPL-SCNC: 139 MMOL/L (ref 135–145)
SPECIMEN SOURCE: NORMAL
TEG ALGORITHM TGALG: ABNORMAL
TOXIC GRANULES BLD QL SMEAR: SLIGHT
TRIGL SERPL-MCNC: 63 MG/DL (ref 0–149)
WBC # BLD AUTO: 8.6 K/UL (ref 4.8–10.8)

## 2022-06-13 PROCEDURE — 96375 TX/PRO/DX INJ NEW DRUG ADDON: CPT

## 2022-06-13 PROCEDURE — 86900 BLOOD TYPING SEROLOGIC ABO: CPT

## 2022-06-13 PROCEDURE — C9803 HOPD COVID-19 SPEC COLLECT: HCPCS | Performed by: EMERGENCY MEDICINE

## 2022-06-13 PROCEDURE — 99291 CRITICAL CARE FIRST HOUR: CPT | Performed by: INTERNAL MEDICINE

## 2022-06-13 PROCEDURE — 700105 HCHG RX REV CODE 258: Performed by: EMERGENCY MEDICINE

## 2022-06-13 PROCEDURE — 85007 BL SMEAR W/DIFF WBC COUNT: CPT

## 2022-06-13 PROCEDURE — 82375 ASSAY CARBOXYHB QUANT: CPT

## 2022-06-13 PROCEDURE — 0241U HCHG SARS-COV-2 COVID-19 NFCT DS RESP RNA 4 TRGT MIC: CPT

## 2022-06-13 PROCEDURE — 80143 DRUG ASSAY ACETAMINOPHEN: CPT

## 2022-06-13 PROCEDURE — 71045 X-RAY EXAM CHEST 1 VIEW: CPT

## 2022-06-13 PROCEDURE — 85025 COMPLETE CBC W/AUTO DIFF WBC: CPT

## 2022-06-13 PROCEDURE — 85384 FIBRINOGEN ACTIVITY: CPT

## 2022-06-13 PROCEDURE — 99221 1ST HOSP IP/OBS SF/LOW 40: CPT | Performed by: STUDENT IN AN ORGANIZED HEALTH CARE EDUCATION/TRAINING PROGRAM

## 2022-06-13 PROCEDURE — HZ2ZZZZ DETOXIFICATION SERVICES FOR SUBSTANCE ABUSE TREATMENT: ICD-10-PCS | Performed by: STUDENT IN AN ORGANIZED HEALTH CARE EDUCATION/TRAINING PROGRAM

## 2022-06-13 PROCEDURE — 700111 HCHG RX REV CODE 636 W/ 250 OVERRIDE (IP)

## 2022-06-13 PROCEDURE — 80061 LIPID PANEL: CPT

## 2022-06-13 PROCEDURE — 85347 COAGULATION TIME ACTIVATED: CPT

## 2022-06-13 PROCEDURE — 96376 TX/PRO/DX INJ SAME DRUG ADON: CPT

## 2022-06-13 PROCEDURE — 84295 ASSAY OF SERUM SODIUM: CPT

## 2022-06-13 PROCEDURE — 700101 HCHG RX REV CODE 250: Performed by: STUDENT IN AN ORGANIZED HEALTH CARE EDUCATION/TRAINING PROGRAM

## 2022-06-13 PROCEDURE — 770000 HCHG ROOM/CARE - INTERMEDIATE ICU *

## 2022-06-13 PROCEDURE — 86850 RBC ANTIBODY SCREEN: CPT

## 2022-06-13 PROCEDURE — 84703 CHORIONIC GONADOTROPIN ASSAY: CPT

## 2022-06-13 PROCEDURE — 36415 COLL VENOUS BLD VENIPUNCTURE: CPT

## 2022-06-13 PROCEDURE — 99253 IP/OBS CNSLTJ NEW/EST LOW 45: CPT | Performed by: SURGERY

## 2022-06-13 PROCEDURE — 302214 INTUBATION BOX: Performed by: EMERGENCY MEDICINE

## 2022-06-13 PROCEDURE — 700111 HCHG RX REV CODE 636 W/ 250 OVERRIDE (IP): Performed by: STUDENT IN AN ORGANIZED HEALTH CARE EDUCATION/TRAINING PROGRAM

## 2022-06-13 PROCEDURE — 85576 BLOOD PLATELET AGGREGATION: CPT | Mod: 91

## 2022-06-13 PROCEDURE — 80179 DRUG ASSAY SALICYLATE: CPT

## 2022-06-13 PROCEDURE — 99255 IP/OBS CONSLTJ NEW/EST HI 80: CPT | Mod: FS | Performed by: NURSE PRACTITIONER

## 2022-06-13 PROCEDURE — 700111 HCHG RX REV CODE 636 W/ 250 OVERRIDE (IP): Performed by: EMERGENCY MEDICINE

## 2022-06-13 PROCEDURE — 700105 HCHG RX REV CODE 258: Performed by: STUDENT IN AN ORGANIZED HEALTH CARE EDUCATION/TRAINING PROGRAM

## 2022-06-13 PROCEDURE — 82550 ASSAY OF CK (CPK): CPT

## 2022-06-13 PROCEDURE — 96374 THER/PROPH/DIAG INJ IV PUSH: CPT

## 2022-06-13 PROCEDURE — 99285 EMERGENCY DEPT VISIT HI MDM: CPT

## 2022-06-13 PROCEDURE — 82077 ASSAY SPEC XCP UR&BREATH IA: CPT

## 2022-06-13 PROCEDURE — 80053 COMPREHEN METABOLIC PANEL: CPT

## 2022-06-13 PROCEDURE — 72125 CT NECK SPINE W/O DYE: CPT

## 2022-06-13 PROCEDURE — 70450 CT HEAD/BRAIN W/O DYE: CPT

## 2022-06-13 PROCEDURE — 86901 BLOOD TYPING SEROLOGIC RH(D): CPT

## 2022-06-13 RX ORDER — LORAZEPAM 2 MG/ML
2 INJECTION INTRAMUSCULAR ONCE
Status: COMPLETED | OUTPATIENT
Start: 2022-06-13 | End: 2022-06-13

## 2022-06-13 RX ORDER — ACETAMINOPHEN 325 MG/1
650 TABLET ORAL EVERY 4 HOURS PRN
Status: DISCONTINUED | OUTPATIENT
Start: 2022-06-13 | End: 2022-06-14

## 2022-06-13 RX ORDER — ACETAMINOPHEN 650 MG/1
650 SUPPOSITORY RECTAL EVERY 4 HOURS PRN
Status: DISCONTINUED | OUTPATIENT
Start: 2022-06-13 | End: 2022-06-14

## 2022-06-13 RX ORDER — HYDRALAZINE HYDROCHLORIDE 20 MG/ML
10 INJECTION INTRAMUSCULAR; INTRAVENOUS EVERY 4 HOURS PRN
Status: DISCONTINUED | OUTPATIENT
Start: 2022-06-13 | End: 2022-06-17 | Stop reason: HOSPADM

## 2022-06-13 RX ORDER — LORAZEPAM 1 MG/1
1 TABLET ORAL EVERY 4 HOURS PRN
Status: DISCONTINUED | OUTPATIENT
Start: 2022-06-13 | End: 2022-06-14

## 2022-06-13 RX ORDER — LORAZEPAM 2 MG/ML
4 INJECTION INTRAMUSCULAR
Status: DISCONTINUED | OUTPATIENT
Start: 2022-06-13 | End: 2022-06-14

## 2022-06-13 RX ORDER — ONDANSETRON 2 MG/ML
INJECTION INTRAMUSCULAR; INTRAVENOUS
Status: COMPLETED
Start: 2022-06-13 | End: 2022-06-13

## 2022-06-13 RX ORDER — DEXMEDETOMIDINE HYDROCHLORIDE 4 UG/ML
.1-1.5 INJECTION INTRAVENOUS CONTINUOUS
Status: DISCONTINUED | OUTPATIENT
Start: 2022-06-13 | End: 2022-06-16

## 2022-06-13 RX ORDER — HYDRALAZINE HYDROCHLORIDE 20 MG/ML
10 INJECTION INTRAMUSCULAR; INTRAVENOUS EVERY 4 HOURS PRN
Status: DISCONTINUED | OUTPATIENT
Start: 2022-06-13 | End: 2022-06-13

## 2022-06-13 RX ORDER — ACETAMINOPHEN 325 MG/1
650 TABLET ORAL EVERY 6 HOURS PRN
Status: DISCONTINUED | OUTPATIENT
Start: 2022-06-13 | End: 2022-06-13

## 2022-06-13 RX ORDER — LORAZEPAM 2 MG/ML
INJECTION INTRAMUSCULAR
Status: COMPLETED
Start: 2022-06-13 | End: 2022-06-13

## 2022-06-13 RX ORDER — LORAZEPAM 2 MG/ML
2 INJECTION INTRAMUSCULAR
Status: DISCONTINUED | OUTPATIENT
Start: 2022-06-13 | End: 2022-06-13

## 2022-06-13 RX ORDER — PROMETHAZINE HYDROCHLORIDE 25 MG/1
12.5-25 SUPPOSITORY RECTAL EVERY 4 HOURS PRN
Status: DISCONTINUED | OUTPATIENT
Start: 2022-06-13 | End: 2022-06-17 | Stop reason: HOSPADM

## 2022-06-13 RX ORDER — GAUZE BANDAGE 2" X 2"
100 BANDAGE TOPICAL DAILY
Status: DISCONTINUED | OUTPATIENT
Start: 2022-06-14 | End: 2022-06-14

## 2022-06-13 RX ORDER — SODIUM CHLORIDE 9 MG/ML
INJECTION, SOLUTION INTRAVENOUS CONTINUOUS
Status: DISCONTINUED | OUTPATIENT
Start: 2022-06-13 | End: 2022-06-13

## 2022-06-13 RX ORDER — MAGNESIUM SULFATE 1 G/100ML
1 INJECTION INTRAVENOUS ONCE
Status: COMPLETED | OUTPATIENT
Start: 2022-06-13 | End: 2022-06-13

## 2022-06-13 RX ORDER — IPRATROPIUM BROMIDE AND ALBUTEROL SULFATE 2.5; .5 MG/3ML; MG/3ML
3 SOLUTION RESPIRATORY (INHALATION)
Status: DISCONTINUED | OUTPATIENT
Start: 2022-06-13 | End: 2022-06-17 | Stop reason: HOSPADM

## 2022-06-13 RX ORDER — LORAZEPAM 2 MG/ML
0.5 INJECTION INTRAMUSCULAR EVERY 4 HOURS PRN
Status: DISCONTINUED | OUTPATIENT
Start: 2022-06-13 | End: 2022-06-14

## 2022-06-13 RX ORDER — LORAZEPAM 0.5 MG/1
0.5 TABLET ORAL EVERY 4 HOURS PRN
Status: DISCONTINUED | OUTPATIENT
Start: 2022-06-13 | End: 2022-06-14

## 2022-06-13 RX ORDER — LEVETIRACETAM 500 MG/5ML
2000 INJECTION, SOLUTION, CONCENTRATE INTRAVENOUS ONCE
Status: COMPLETED | OUTPATIENT
Start: 2022-06-13 | End: 2022-06-13

## 2022-06-13 RX ORDER — DIAZEPAM 5 MG/1
10 TABLET ORAL EVERY 6 HOURS
Status: DISCONTINUED | OUTPATIENT
Start: 2022-06-13 | End: 2022-06-14

## 2022-06-13 RX ORDER — GUAIFENESIN/DEXTROMETHORPHAN 100-10MG/5
10 SYRUP ORAL EVERY 6 HOURS PRN
Status: DISCONTINUED | OUTPATIENT
Start: 2022-06-13 | End: 2022-06-14

## 2022-06-13 RX ORDER — PROCHLORPERAZINE EDISYLATE 5 MG/ML
5-10 INJECTION INTRAMUSCULAR; INTRAVENOUS EVERY 4 HOURS PRN
Status: DISCONTINUED | OUTPATIENT
Start: 2022-06-13 | End: 2022-06-17 | Stop reason: HOSPADM

## 2022-06-13 RX ORDER — BISACODYL 10 MG
10 SUPPOSITORY, RECTAL RECTAL
Status: DISCONTINUED | OUTPATIENT
Start: 2022-06-13 | End: 2022-06-14

## 2022-06-13 RX ORDER — LEVETIRACETAM 500 MG/5ML
500 INJECTION, SOLUTION, CONCENTRATE INTRAVENOUS EVERY 12 HOURS
Status: DISCONTINUED | OUTPATIENT
Start: 2022-06-13 | End: 2022-06-15

## 2022-06-13 RX ORDER — ONDANSETRON 4 MG/1
4 TABLET, ORALLY DISINTEGRATING ORAL EVERY 4 HOURS PRN
Status: DISCONTINUED | OUTPATIENT
Start: 2022-06-13 | End: 2022-06-13

## 2022-06-13 RX ORDER — POLYETHYLENE GLYCOL 3350 17 G/17G
1 POWDER, FOR SOLUTION ORAL
Status: DISCONTINUED | OUTPATIENT
Start: 2022-06-13 | End: 2022-06-14

## 2022-06-13 RX ORDER — LORAZEPAM 2 MG/ML
2 INJECTION INTRAMUSCULAR
Status: DISCONTINUED | OUTPATIENT
Start: 2022-06-13 | End: 2022-06-17 | Stop reason: HOSPADM

## 2022-06-13 RX ORDER — ONDANSETRON 2 MG/ML
4 INJECTION INTRAMUSCULAR; INTRAVENOUS EVERY 4 HOURS PRN
Status: DISCONTINUED | OUTPATIENT
Start: 2022-06-13 | End: 2022-06-13

## 2022-06-13 RX ORDER — ENALAPRILAT 1.25 MG/ML
1.25 INJECTION INTRAVENOUS EVERY 6 HOURS PRN
Status: DISCONTINUED | OUTPATIENT
Start: 2022-06-13 | End: 2022-06-17 | Stop reason: HOSPADM

## 2022-06-13 RX ORDER — LORAZEPAM 2 MG/ML
2 INJECTION INTRAMUSCULAR
Status: DISCONTINUED | OUTPATIENT
Start: 2022-06-13 | End: 2022-06-14

## 2022-06-13 RX ORDER — ENALAPRILAT 1.25 MG/ML
1.25 INJECTION INTRAVENOUS EVERY 6 HOURS PRN
Status: DISCONTINUED | OUTPATIENT
Start: 2022-06-13 | End: 2022-06-13

## 2022-06-13 RX ORDER — ONDANSETRON 2 MG/ML
4 INJECTION INTRAMUSCULAR; INTRAVENOUS EVERY 4 HOURS PRN
Status: DISCONTINUED | OUTPATIENT
Start: 2022-06-13 | End: 2022-06-14

## 2022-06-13 RX ORDER — PROMETHAZINE HYDROCHLORIDE 25 MG/1
12.5-25 TABLET ORAL EVERY 4 HOURS PRN
Status: DISCONTINUED | OUTPATIENT
Start: 2022-06-13 | End: 2022-06-14

## 2022-06-13 RX ORDER — FOLIC ACID 1 MG/1
1 TABLET ORAL DAILY
Status: DISCONTINUED | OUTPATIENT
Start: 2022-06-14 | End: 2022-06-14

## 2022-06-13 RX ORDER — LORAZEPAM 2 MG/ML
1.5 INJECTION INTRAMUSCULAR
Status: DISCONTINUED | OUTPATIENT
Start: 2022-06-13 | End: 2022-06-14

## 2022-06-13 RX ORDER — SODIUM CHLORIDE 9 MG/ML
1000 INJECTION, SOLUTION INTRAVENOUS ONCE
Status: COMPLETED | OUTPATIENT
Start: 2022-06-13 | End: 2022-06-13

## 2022-06-13 RX ORDER — SODIUM CHLORIDE AND POTASSIUM CHLORIDE 300; 900 MG/100ML; MG/100ML
INJECTION, SOLUTION INTRAVENOUS CONTINUOUS
Status: DISPENSED | OUTPATIENT
Start: 2022-06-13 | End: 2022-06-14

## 2022-06-13 RX ORDER — LORAZEPAM 2 MG/ML
1 INJECTION INTRAMUSCULAR
Status: DISCONTINUED | OUTPATIENT
Start: 2022-06-13 | End: 2022-06-14

## 2022-06-13 RX ORDER — SODIUM CHLORIDE AND POTASSIUM CHLORIDE 150; 900 MG/100ML; MG/100ML
INJECTION, SOLUTION INTRAVENOUS CONTINUOUS
Status: DISCONTINUED | OUTPATIENT
Start: 2022-06-13 | End: 2022-06-13

## 2022-06-13 RX ORDER — AMOXICILLIN 250 MG
2 CAPSULE ORAL 2 TIMES DAILY
Status: DISCONTINUED | OUTPATIENT
Start: 2022-06-13 | End: 2022-06-14

## 2022-06-13 RX ORDER — DIAZEPAM 5 MG/1
5 TABLET ORAL EVERY 6 HOURS
Status: DISCONTINUED | OUTPATIENT
Start: 2022-06-14 | End: 2022-06-14

## 2022-06-13 RX ORDER — LORAZEPAM 2 MG/1
2 TABLET ORAL
Status: DISCONTINUED | OUTPATIENT
Start: 2022-06-13 | End: 2022-06-14

## 2022-06-13 RX ORDER — LORAZEPAM 2 MG/1
4 TABLET ORAL
Status: DISCONTINUED | OUTPATIENT
Start: 2022-06-13 | End: 2022-06-14

## 2022-06-13 RX ORDER — ONDANSETRON 4 MG/1
4 TABLET, ORALLY DISINTEGRATING ORAL EVERY 4 HOURS PRN
Status: DISCONTINUED | OUTPATIENT
Start: 2022-06-13 | End: 2022-06-14

## 2022-06-13 RX ORDER — HYDRALAZINE HYDROCHLORIDE 20 MG/ML
20 INJECTION INTRAMUSCULAR; INTRAVENOUS EVERY 6 HOURS PRN
Status: DISCONTINUED | OUTPATIENT
Start: 2022-06-13 | End: 2022-06-13

## 2022-06-13 RX ADMIN — LORAZEPAM 2 MG: 2 INJECTION INTRAMUSCULAR; INTRAVENOUS at 16:16

## 2022-06-13 RX ADMIN — SODIUM CHLORIDE 1000 ML: 9 INJECTION, SOLUTION INTRAVENOUS at 16:30

## 2022-06-13 RX ADMIN — LORAZEPAM 2 MG: 2 INJECTION INTRAMUSCULAR; INTRAVENOUS at 23:53

## 2022-06-13 RX ADMIN — LORAZEPAM 2 MG: 2 INJECTION INTRAMUSCULAR; INTRAVENOUS at 21:16

## 2022-06-13 RX ADMIN — POTASSIUM CHLORIDE AND SODIUM CHLORIDE: 900; 300 INJECTION, SOLUTION INTRAVENOUS at 20:29

## 2022-06-13 RX ADMIN — LORAZEPAM 2 MG: 2 INJECTION INTRAMUSCULAR; INTRAVENOUS at 23:24

## 2022-06-13 RX ADMIN — LORAZEPAM 2 MG: 2 INJECTION INTRAMUSCULAR at 15:26

## 2022-06-13 RX ADMIN — LORAZEPAM 2 MG: 2 INJECTION INTRAMUSCULAR; INTRAVENOUS at 22:03

## 2022-06-13 RX ADMIN — SODIUM CHLORIDE: 9 INJECTION, SOLUTION INTRAVENOUS at 19:31

## 2022-06-13 RX ADMIN — LEVETIRACETAM 2000 MG: 100 INJECTION, SOLUTION INTRAVENOUS at 15:39

## 2022-06-13 RX ADMIN — THIAMINE HYDROCHLORIDE: 100 INJECTION, SOLUTION INTRAMUSCULAR; INTRAVENOUS at 19:48

## 2022-06-13 RX ADMIN — LORAZEPAM 2 MG: 2 INJECTION INTRAMUSCULAR at 16:16

## 2022-06-13 RX ADMIN — LORAZEPAM 2 MG: 2 INJECTION INTRAMUSCULAR; INTRAVENOUS at 15:39

## 2022-06-13 RX ADMIN — LORAZEPAM 2 MG: 2 INJECTION INTRAMUSCULAR; INTRAVENOUS at 22:29

## 2022-06-13 RX ADMIN — LORAZEPAM 2 MG: 2 INJECTION INTRAMUSCULAR at 15:39

## 2022-06-13 RX ADMIN — LEVETIRACETAM 500 MG: 100 INJECTION, SOLUTION INTRAVENOUS at 19:52

## 2022-06-13 RX ADMIN — MAGNESIUM SULFATE HEPTAHYDRATE 1 G: 1 INJECTION, SOLUTION INTRAVENOUS at 20:31

## 2022-06-13 RX ADMIN — ONDANSETRON HYDROCHLORIDE 4 MG: 2 SOLUTION INTRAMUSCULAR; INTRAVENOUS at 15:35

## 2022-06-13 RX ADMIN — LORAZEPAM 2 MG: 2 INJECTION INTRAMUSCULAR; INTRAVENOUS at 15:26

## 2022-06-13 ASSESSMENT — LIFESTYLE VARIABLES
VISUAL DISTURBANCES: NOT PRESENT
PAROXYSMAL SWEATS: *
PAROXYSMAL SWEATS: BEADS OF SWEAT OBVIOUS ON FOREHEAD
HEADACHE, FULLNESS IN HEAD: MODERATELY SEVERE
AGITATION: SOMEWHAT MORE THAN NORMAL ACTIVITY
VISUAL DISTURBANCES: NOT PRESENT
AGITATION: SOMEWHAT MORE THAN NORMAL ACTIVITY
AGITATION: *
HEADACHE, FULLNESS IN HEAD: MILD
AUDITORY DISTURBANCES: MODERATE HARSHNESS OR ABILITY TO FRIGHTEN
AUDITORY DISTURBANCES: MILD HARSHNESS OR ABILITY TO FRIGHTEN
ORIENTATION AND CLOUDING OF SENSORIUM: DATE DISORIENTATION BY MORE THAN TWO CALENDAR DAYS
ANXIETY: *
AUDITORY DISTURBANCES: NOT PRESENT
ANXIETY: MILDLY ANXIOUS
NAUSEA AND VOMITING: *
VISUAL DISTURBANCES: MODERATE SENSITIVITY
PAROXYSMAL SWEATS: NO SWEAT VISIBLE
HEADACHE, FULLNESS IN HEAD: MILD
AGITATION: *
HEADACHE, FULLNESS IN HEAD: MILD
TOTAL SCORE: MODERATELY SEVERE HALLUCINATIONS
TREMOR: *
AGITATION: *
HEADACHE, FULLNESS IN HEAD: VERY MILD
AUDITORY DISTURBANCES: MODERATE HARSHNESS OR ABILITY TO FRIGHTEN
NAUSEA AND VOMITING: *
HEADACHE, FULLNESS IN HEAD: NOT PRESENT
NAUSEA AND VOMITING: INTERMITTENT NAUSEA WITH DRY HEAVES
TOTAL SCORE: 28
VISUAL DISTURBANCES: MILD SENSITIVITY
HEADACHE, FULLNESS IN HEAD: NOT PRESENT
NAUSEA AND VOMITING: INTERMITTENT NAUSEA WITH DRY HEAVES
PAROXYSMAL SWEATS: BARELY PERCEPTIBLE SWEATING. PALMS MOIST
TOTAL SCORE: 29
ANXIETY: MILDLY ANXIOUS
ORIENTATION AND CLOUDING OF SENSORIUM: DATE DISORIENTATION BY MORE THAN TWO CALENDAR DAYS
TOTAL SCORE: 32
ORIENTATION AND CLOUDING OF SENSORIUM: DATE DISORIENTATION BY MORE THAN TWO CALENDAR DAYS
NAUSEA AND VOMITING: NO NAUSEA AND NO VOMITING
VISUAL DISTURBANCES: MODERATELY SEVERE HALLUCINATIONS
AUDITORY DISTURBANCES: MODERATE HARSHNESS OR ABILITY TO FRIGHTEN
ANXIETY: *
ORIENTATION AND CLOUDING OF SENSORIUM: CANNOT DO SERIAL ADDITIONS OR IS UNCERTAIN ABOUT DATE
AUDITORY DISTURBANCES: NOT PRESENT
NAUSEA AND VOMITING: *
AUDITORY DISTURBANCES: MODERATE HARSHNESS OR ABILITY TO FRIGHTEN
PAROXYSMAL SWEATS: NO SWEAT VISIBLE
ANXIETY: *
TOTAL SCORE: 4
AGITATION: *
ANXIETY: *
TOTAL SCORE: MODERATE ITCHING, PINS AND NEEDLES SENSATION, BURNING OR NUMBNESS
TREMOR: *
TREMOR: TREMOR NOT VISIBLE BUT CAN BE FELT, FINGERTIP TO FINGERTIP
TOTAL SCORE: MODERATE ITCHING, PINS AND NEEDLES SENSATION, BURNING OR NUMBNESS
ORIENTATION AND CLOUDING OF SENSORIUM: ORIENTED AND CAN DO SERIAL ADDITIONS
TREMOR: TREMOR NOT VISIBLE BUT CAN BE FELT, FINGERTIP TO FINGERTIP
HEADACHE, FULLNESS IN HEAD: MODERATE
TOTAL SCORE: 7
ORIENTATION AND CLOUDING OF SENSORIUM: DATE DISORIENTATION BY MORE THAN TWO CALENDAR DAYS
NAUSEA AND VOMITING: MILD NAUSEA WITH NO VOMITING
VISUAL DISTURBANCES: NOT PRESENT
AGITATION: *
TREMOR: NO TREMOR
VISUAL DISTURBANCES: MODERATELY SEVERE HALLUCINATIONS
PAROXYSMAL SWEATS: BEADS OF SWEAT OBVIOUS ON FOREHEAD
AUDITORY DISTURBANCES: NOT PRESENT
TOTAL SCORE: 36
PAROXYSMAL SWEATS: BEADS OF SWEAT OBVIOUS ON FOREHEAD
TOTAL SCORE: MILD ITCHING, PINS AND NEEDLES SENSATION, BURNING OR NUMBNESS
ANXIETY: *
ORIENTATION AND CLOUDING OF SENSORIUM: CANNOT DO SERIAL ADDITIONS OR IS UNCERTAIN ABOUT DATE
ORIENTATION AND CLOUDING OF SENSORIUM: CANNOT DO SERIAL ADDITIONS OR IS UNCERTAIN ABOUT DATE
PAROXYSMAL SWEATS: *
TOTAL SCORE: 32
TOTAL SCORE: MODERATE ITCHING, PINS AND NEEDLES SENSATION, BURNING OR NUMBNESS
TOTAL SCORE: 3
TREMOR: TREMOR NOT VISIBLE BUT CAN BE FELT, FINGERTIP TO FINGERTIP
TREMOR: NO TREMOR
TREMOR: TREMOR NOT VISIBLE BUT CAN BE FELT, FINGERTIP TO FINGERTIP
ANXIETY: MILDLY ANXIOUS
NAUSEA AND VOMITING: MILD NAUSEA WITH NO VOMITING
AGITATION: SOMEWHAT MORE THAN NORMAL ACTIVITY
VISUAL DISTURBANCES: MODERATE SENSITIVITY

## 2022-06-13 ASSESSMENT — PAIN DESCRIPTION - PAIN TYPE: TYPE: ACUTE PAIN

## 2022-06-13 ASSESSMENT — ENCOUNTER SYMPTOMS
WEAKNESS: 1
FALLS: 1
DIZZINESS: 0
SEIZURES: 1
SPEECH CHANGE: 1
HEADACHES: 0

## 2022-06-13 NOTE — ED NOTES
Med rec updated and complete. Allergies reviewed.  Pt only able to state birth date.   [facility]. No active profiles located.  Last fill > 2 years ago.

## 2022-06-13 NOTE — ED PROVIDER NOTES
ED Physician Note    Chief Concern:   Seizure    HPI:  Franchesca Ortez is a very pleasant 36-year-old woman who presents to the emergency department after a seizure.  This was witnessed by bystanders.  She states that she did have a remote history of seizures, but has not had a seizure since she was 11 years old, which is 25 years ago.  She was on antiepileptic medications as a child, but those have since been discontinued.  She reports no recent fevers, no heavy alcohol use, no history of alcohol use disorder alcohol withdrawal.  She reports no recent pain medication use, no recent tramadol use.  No sick household contacts identified.  She did have a recent sore throat, though without fever.  She reports no congestion, no cough, no chest discomfort.  She reports no urinary symptoms, no dysuria, no hematuria.  She is unable to identify any reliably exacerbating or alleviating factors.  Seizure was described by bystanders as generalized tonic-clonic, self-limiting.  She did seem somewhat confused after the event, and bit her tongue.  She is back to her neurologic baseline on arrival to the emergency department.  She does have a very mild tremor in the exam room, she states she thinks this is due to hunger, and anxiety from the event.     Review of Systems:  See HPI for pertinent positives and negatives. All other systems negative.    Past Medical History:   has a past medical history of Alcohol abuse, Depression, and History of suicide attempt.    Social History:  Social History     Tobacco Use   • Smoking status: Current Every Day Smoker     Packs/day: 0.50     Years: 15.00     Pack years: 7.50     Types: Cigarettes   • Smokeless tobacco: Never Used   • Tobacco comment: 3/4 pk a day   Substance and Sexual Activity   • Alcohol use: No   • Drug use: No   • Sexual activity: Yes     Partners: Male     Birth control/protection: I.U.D.       Surgical History:   has a past surgical history that includes gyn surgery and  "primary c section.    Current Medications:  Home Medications     Reviewed by Glenny Forde (Pharmacy Tech) on 06/13/22 at 1640  Med List Status: Complete   Medication Last Dose Status        Patient Mauricio Taking any Medications                       Allergies:  No Known Allergies    Physical Exam:  Vital Signs: /72   Pulse (!) 136   Temp 36.6 °C (97.8 °F) (Oral)   Resp 20   Ht 1.651 m (5' 5\")   Wt 70.3 kg (155 lb)   SpO2 97%   BMI 25.79 kg/m²   Constitutional: Alert, no acute distress  HENT: Laceration to the base of the tongue with overlying clot, no active bleeding, small aphthous ulcer to posterior pharynx, no patchy exudates, uvula midline, no pharyngeal edema  Eyes: Pupils equal and reactive, normal conjunctiva  Neck: Supple, normal range of motion, no stridor  Cardiovascular: Extremities are warm and well perfused, no murmur appreciated, normal cardiac auscultation  Pulmonary: No respiratory distress, normal work of breathing, no accessory muscule usage, breath sounds clear and equal bilaterally  Abdomen: Soft, non-distended, non-tender to palpation, no peritoneal signs  Skin: Warm, dry, no rashes or lesions  Musculoskeletal: Normal range of motion in all extremities, no swelling or deformity noted  Neurologic: Alert, oriented, normal speech, normal motor function, very mild tremor to bilateral hands that is intermittent, no facial droop, no slurred speech, no ataxia, 5 out of 5 upper and lower extremity strength  Psychiatric: Normal and appropriate mood and affect    Medical records reviewed for continuity of care.  No recent visits for similar symptoms.  She did have a visit in 2018 for intentional Tylenol overdose.    Labs:  Labs Reviewed   CBC WITH DIFFERENTIAL - Abnormal; Notable for the following components:       Result Value    RBC 3.10 (*)     Hemoglobin 11.0 (*)     Hematocrit 32.8 (*)     .8 (*)     MCH 35.5 (*)     MCHC 33.5 (*)     RDW 51.3 (*)     Platelet Count 132 (*)  "    Neutrophils-Polys 85.10 (*)     Lymphocytes 9.60 (*)     Neutrophils (Absolute) 7.32 (*)     Lymphs (Absolute) 0.83 (*)     All other components within normal limits   COMP METABOLIC PANEL - Abnormal; Notable for the following components:    Potassium 3.5 (*)     Co2 17 (*)     Anion Gap 21.0 (*)     Glucose 132 (*)     AST(SGOT) 158 (*)     ALT(SGPT) 63 (*)     All other components within normal limits   CARBOXYHEMOGLOBIN   HCG QUAL SERUM   DIAGNOSTIC ALCOHOL   COV-2, FLU A/B, AND RSV BY PCR ([employer])   ESTIMATED GFR   DIFFERENTIAL MANUAL   PERIPHERAL SMEAR REVIEW   PLATELET ESTIMATE   MORPHOLOGY   URINALYSIS,CULTURE IF INDICATED   URINE DRUG SCREEN   ACETAMINOPHEN   SALICYLATE   COD (ADULT)   PLATELET MAPPING WITH BASIC TEG   ABO RH CONFIRM       Radiology:  CT-HEAD W/O   Final Result      1. Right parietal subarachnoid hemorrhage in two cortical sulci.   2. Right parietal scalp hematoma.   3. No calvarial fracture.   4. Chronic right-sided paranasal sinus disease.      Based solely on CT findings, the brain injury guideline category is mBIG 1.      SDH < 4mm   IPH < 4mm   SAH < 3 sulci and < 1mm      The original BIG retrospective analysis found radiographic progression in 0% of BIG 1 patients and 2.6% BIG 2.      I, Dr. Golden Weiss, discussed the results of this examination directly by phone with Dr. Evelin Oconnor on 6/13/2022 at 1637 hours.      DX-CHEST-PORTABLE (1 VIEW)   Final Result      No acute cardiac or pulmonary abnormalities are identified.           ED Medications Administered:  Medications   PROPOFOL 10 MG/ML IV EMUL (has no administration in time range)   LORazepam (ATIVAN) injection 2 mg (2 mg Intravenous Given 6/13/22 1539)   ONDANSETRON HCL 4 MG/2ML INJ SOLN (4 mg  Given 6/13/22 1535)   levETIRAcetam (Keppra) injection 2,000 mg (2,000 mg Intravenous Given 6/13/22 1539)   NS (BOLUS) infusion 1,000 mL (1,000 mL Intravenous New Bag 6/13/22 1630)   LORazepam (ATIVAN) injection 2 mg (2 mg  Intravenous Given 6/13/22 1616)       Differential diagnosis:  Seizure disorder, breakthrough seizure, medication or substance induced seizure, alcohol withdrawal seizure, electrolyte abnormality, intracranial injury, intracranial mass or lesion, preeclampsia, carbon monoxide toxicity, viral illness    MDM:  Ms. Ortez presents to the emergency department today for evaluation of a seizure.  On arrival she has returned to baseline, she does have a laceration under the tongue that is hemostatic on arrival with clot in place.  She denies any history of heavy alcohol use, states she is not pregnant.  She does report a history of seizure when she was 11, though no seizures since that time.  She is not tremulous on arrival, though is mildly tachycardic.  Reports no associated nausea or vomiting.  She is on certain but does not think she struck her head when she had the seizure.  She does not fully recall the event but seems to be back to her neurologic baseline on arrival.  Postictal state was reported by transporting paramedics.    Shortly after my evaluation I was called because the patient was having a seizure.  2 mg of Ativan was ordered verbally.  She did reopen her tongue wound and had some bleeding during the seizure, nursing staff were concerned about her airway.  Seizure stopped with administration of Ativan, she was protecting her airway with oxygen saturation 100% on 3 L via nasal cannula.  She has not had any gurgling respirations, no coughing, she is swallowing secretions with no evidence of imminent respiratory compromise.  Tongue wound stop bleeding as the seizure stopped, blood was suctioned from the oropharynx during her seizure.  CAROL Pino reported that she developed some worsening tremors before the seizure.  She again asked the patient about alcohol consumption, patient did not give a clear history of daily alcohol consumption, but with visible escalating tremors alcohol withdrawal remains a concern.   After the seizure she was treated with Keppra, as well as another 2 mg of Ativan in the event that this was an alcohol withdrawal seizure.  No labs had resulted at this time excepting a negative hCG, and negative carboxyhemoglobin.    On laboratory evaluation influenza and COVID-19 testing are negative.  She has a normal white blood count, less concerning for infectious etiology.  Her hemoglobin is 11, white count is 132.  She has no significant electrolyte abnormalities, potassium is just below normal reference range at 3.5.  CO2 is 17 with anion gap of 21.  Glucose is not significantly elevated at 132.  She does have a mildly elevated AST and ALT of 158 and 63 respectively.  Diagnostic alcohol is undetectable.    Chest x-ray was obtained to evaluate for any evidence of aspiration that may have occurred during the seizure, this demonstrates no acute cardiac or pulmonary abnormalities.    CT head result was discussed with reading radiologist.  This reveals a right parietal subarachnoid hemorrhage and 2 cortical sulci consistent with big 1.  Right parietal scalp hematoma noted as well, this was not visualized on physical exam due to long matted hair.    With regard to her tongue laceration, it is hemostatic, does not appear to be through and through.  Appears as though it should heal well without closure.  I discussed her tongue laceration with Dr. Brennan, to determine if I should close it based on severity.  He recommends against closure of tongue lacerations, as they generally heal quickly without intervention.    I discussed her presentation with Dr. Sherwood who kindly agrees to evaluate the patient, neurology service will follow the patient throughout her ICU admission.    Due to traumatic subarachnoid hemorrhage, classified as BIG 1, I did place a call to Dr. Michaud currently agrees to consult on the patient.  He request CT of the cervical spine, this was ordered in the emergency department.    Plan at this time  is for admission to [facility].  Her case was discussed with hospitalist, as well as Dr. Serrano, intensivist on-call today.  At this time, she has received a total of 6 mg of Ativan, as well as Keppra.  She does become slightly drowsy with Ativan administration, however tachycardia and hypertension quickly normalized, again suggestive of alcohol withdrawal.  She will be monitored on CIWA protocol in the IMCU.    Critical Care  I spent 65 minutes of critical care time with this patient. This does not include time spent on separately reported billable procedures.   This includes pulse ox interpretation, medical record review when available, interpretation of labs and imaging, specialist consultation, and hemodynamic monitoring.    Personal protective equipment including N95 surgical respirator, goggles, and gloves were used during this encounter.       Disposition:  Admit to [facility] in critical condition    Final Impression:  1. Seizure (HCC)    2. Status epilepticus (HCC)    3. History of alcohol abuse    4. Subarachnoid hemorrhage (HCC)    5. Laceration of tongue, initial encounter        Electronically signed by: Evelin Freed MD, 6/13/2022 6:01 PM

## 2022-06-13 NOTE — ED NOTES
Ready to go to the bathroom. Pt is agitated and shaking. Asked Pt if she consumes alcohol and expressed concern for alcohol withdrawal seizure. Pt then fell backwards and seized, additional RN called to bedside. Pt bit tongue again, suctioned oral cavity. ERP called to bedside. Ativan given IV

## 2022-06-13 NOTE — CONSULTS
Critical Care Consultation    Date of consult: 6/13/2022    Referring Physician  Evelin Freed M.D.    Reason for Consultation  Seizure and concerns for alcohol withdrawal    History of Presenting Illness  36 y.o. female who presented 6/13/2022 with hx of mood disorder, major depression, prior suicide attempt, alcohol abuse, hx of seizure disorder, fibromyalgia. Per report with childhood seizure disorder up until 11yrs of age but this could not be verified. That presented after a seizure today. She then had another seizure in ER was given Ativan 2mg IV seizure was abated. She did bite her tongue in the process. She later per nursing report had tremors prior to seizure. On further chart review she has report alcohol abuse and frequent admission for SI and alcohol use disorder. She was calm without tremors in ER. It was hard to interpret her history with her tongue swelling. She was able to motion to significant alcohol use and occasional drug use. Current some of her labs and CT scans are currently pending. I have been ask to consult for possible ICU admission for seizure and alcohol withdrawal.     Code Status  Full Code    Review of Systems  Review of Systems   Unable to perform ROS: Other (tongue laceration/swelling)       Past Medical History   has a past medical history of Alcohol abuse, Depression, and History of suicide attempt.    Surgical History   has a past surgical history that includes gyn surgery and primary c section.    Family History  family history includes Cancer in her maternal grandmother; Diabetes in her father; Hypertension in her father.    Social History   reports that she has been smoking cigarettes. She has a 7.50 pack-year smoking history. She has never used smokeless tobacco. She reports that she does not drink alcohol and does not use drugs.    Medications  Home Medications     Reviewed by Glenny Forde (Pharmacy Tech) on 06/13/22 at 1640  Med List Status: Complete   Medication  Last Dose Status        Patient Mauricio Taking any Medications                     Current Facility-Administered Medications   Medication Dose Route Frequency Provider Last Rate Last Admin   • PROPOFOL 10 MG/ML IV EMUL            • senna-docusate (PERICOLACE or SENOKOT S) 8.6-50 MG per tablet 2 Tablet  2 Tablet Oral BID Danis Colin M.D.        And   • polyethylene glycol/lytes (MIRALAX) PACKET 1 Packet  1 Packet Oral QDAY PRN Danis Colin M.D.        And   • magnesium hydroxide (MILK OF MAGNESIA) suspension 30 mL  30 mL Oral QDAY PRN Danis Colin M.D.        And   • bisacodyl (DULCOLAX) suppository 10 mg  10 mg Rectal QDAY PRN Danis Colin M.D.       • promethazine (PHENERGAN) tablet 12.5-25 mg  12.5-25 mg Oral Q4HRS PRN Danis Colin M.D.       • promethazine (PHENERGAN) suppository 12.5-25 mg  12.5-25 mg Rectal Q4HRS PRN Danis Colin M.D.       • prochlorperazine (COMPAZINE) injection 5-10 mg  5-10 mg Intravenous Q4HRS PRN Danis Colin M.D.       • guaiFENesin dextromethorphan (ROBITUSSIN DM) 100-10 MG/5ML syrup 10 mL  10 mL Oral Q6HRS PRN Danis Colin M.D.       • Respiratory Therapy Consult   Nebulization Continuous RT Danis Colin M.D.       • NS infusion   Intravenous Continuous Danis Colin M.D.       • ondansetron (ZOFRAN ODT) dispertab 4 mg  4 mg Oral Q4HRS PRN Danis Colin M.D.        Or   • ondansetron (ZOFRAN) syringe/vial injection 4 mg  4 mg Intravenous Q4HRS PRN Danis Colin M.D.       • acetaminophen (Tylenol) tablet 650 mg  650 mg Oral Q4HRS PRN Danis Colin M.D.        Or   • acetaminophen (TYLENOL) suppository 650 mg  650 mg Rectal Q4HRS PRN Danis Colin M.D.       • MD Alert...ICU Electrolyte Replacement per Pharmacy   Other PHARMACY TO DOSE Danis Colin M.D.       • hydrALAZINE (APRESOLINE) injection 10 mg  10 mg Intravenous Q4HRS PRN Danis Colin M.D.       • enalaprilat (Vasotec) injection 1.25 mg 1 mL  1.25 mg Intravenous Q6HRS PRN Danis Colin M.D.       • LORazepam (ATIVAN)  injection 4 mg  4 mg Intravenous Q10 MIN PRN Danis Colin M.D.       • LORazepam (ATIVAN) injection 2 mg  2 mg Intravenous Q5 MIN PRN Danis Colin M.D.         No current outpatient medications on file.       Allergies  No Known Allergies    Vital Signs last 24 hours  Temp:  [36.6 °C (97.8 °F)] 36.6 °C (97.8 °F)  Pulse:  [] 136  Resp:  [15-54] 20  BP: (125-162)/(69-91) 133/72  SpO2:  [95 %-98 %] 97 %    Physical Exam  Physical Exam  Vitals and nursing note reviewed.   Constitutional:       General: She is not in acute distress.     Appearance: She is not ill-appearing.      Comments: Sitting up in bed that does awake with stimulation.    HENT:      Head: Normocephalic.      Mouth/Throat:      Mouth: Mucous membranes are moist.      Comments: Tongue with swelling, small laceration not bleeding to left tongue and more anteriorly, no drooling  Eyes:      Pupils: Pupils are equal, round, and reactive to light.   Neck:      Comments: No stridor  Cardiovascular:      Rate and Rhythm: Normal rate.      Heart sounds: No murmur heard.  Pulmonary:      Effort: No respiratory distress.      Breath sounds: No stridor. No wheezing or rhonchi.   Abdominal:      General: There is no distension.      Palpations: There is no mass.      Tenderness: There is no abdominal tenderness. There is no guarding or rebound.      Hernia: No hernia is present.   Musculoskeletal:         General: No swelling or tenderness.   Skin:     Coloration: Skin is not jaundiced.   Neurological:      Mental Status: She is alert.      Comments: She is awake with gentle stimulation and verbally post ativan, she is no tremulous with hand extension, she follows commands in all extremities, her speech is difficult to understand with her tongue swellling, no facial droop.    Psychiatric:      Comments: Difficult to ascertain.         Fluids  No intake or output data in the 24 hours ending 06/13/22 0071    Laboratory  Recent Results (from the past 48  hour(s))   CBC WITH DIFFERENTIAL    Collection Time: 06/13/22  1:54 PM   Result Value Ref Range    WBC 8.6 4.8 - 10.8 K/uL    RBC 3.10 (L) 4.20 - 5.40 M/uL    Hemoglobin 11.0 (L) 12.0 - 16.0 g/dL    Hematocrit 32.8 (L) 37.0 - 47.0 %    .8 (H) 81.4 - 97.8 fL    MCH 35.5 (H) 27.0 - 33.0 pg    MCHC 33.5 (L) 33.6 - 35.0 g/dL    RDW 51.3 (H) 35.9 - 50.0 fL    Platelet Count 132 (L) 164 - 446 K/uL    MPV 9.7 9.0 - 12.9 fL    Neutrophils-Polys 85.10 (H) 44.00 - 72.00 %    Lymphocytes 9.60 (L) 22.00 - 41.00 %    Monocytes 3.50 0.00 - 13.40 %    Eosinophils 0.90 0.00 - 6.90 %    Basophils 0.90 0.00 - 1.80 %    Nucleated RBC 0.00 /100 WBC    Neutrophils (Absolute) 7.32 (H) 2.00 - 7.15 K/uL    Lymphs (Absolute) 0.83 (L) 1.00 - 4.80 K/uL    Monos (Absolute) 0.30 0.00 - 0.85 K/uL    Eos (Absolute) 0.08 0.00 - 0.51 K/uL    Baso (Absolute) 0.08 0.00 - 0.12 K/uL    NRBC (Absolute) 0.00 K/uL    Anisocytosis 1+     Macrocytosis 1+     Microcytosis 1+    COMP METABOLIC PANEL    Collection Time: 06/13/22  1:54 PM   Result Value Ref Range    Sodium 138 135 - 145 mmol/L    Potassium 3.5 (L) 3.6 - 5.5 mmol/L    Chloride 100 96 - 112 mmol/L    Co2 17 (L) 20 - 33 mmol/L    Anion Gap 21.0 (H) 7.0 - 16.0    Glucose 132 (H) 65 - 99 mg/dL    Bun 10 8 - 22 mg/dL    Creatinine 0.63 0.50 - 1.40 mg/dL    Calcium 9.1 8.5 - 10.5 mg/dL    AST(SGOT) 158 (H) 12 - 45 U/L    ALT(SGPT) 63 (H) 2 - 50 U/L    Alkaline Phosphatase 94 30 - 99 U/L    Total Bilirubin 0.8 0.1 - 1.5 mg/dL    Albumin 3.9 3.2 - 4.9 g/dL    Total Protein 7.4 6.0 - 8.2 g/dL    Globulin 3.5 1.9 - 3.5 g/dL    A-G Ratio 1.1 g/dL   HCG Qual Serum    Collection Time: 06/13/22  1:54 PM   Result Value Ref Range    Beta-Hcg Qualitative Serum Negative Negative   DIAGNOSTIC ALCOHOL    Collection Time: 06/13/22  1:54 PM   Result Value Ref Range    Diagnostic Alcohol <10.1 <10.1 mg/dL   ESTIMATED GFR    Collection Time: 06/13/22  1:54 PM   Result Value Ref Range    GFR (CKD-EPI) 118 >60  mL/min/1.73 m 2   DIFFERENTIAL MANUAL    Collection Time: 06/13/22  1:54 PM   Result Value Ref Range    Manual Diff Status PERFORMED    PERIPHERAL SMEAR REVIEW    Collection Time: 06/13/22  1:54 PM   Result Value Ref Range    Peripheral Smear Review see below    PLATELET ESTIMATE    Collection Time: 06/13/22  1:54 PM   Result Value Ref Range    Plt Estimation Decreased    MORPHOLOGY    Collection Time: 06/13/22  1:54 PM   Result Value Ref Range    RBC Morphology Present     Large Platelets 1+     Toxic Gran Slight    CARBOXYHEMOGLOBIN (carbon monoxide)    Collection Time: 06/13/22  3:08 PM   Result Value Ref Range    Carbon Monoxide-Co 2.00 0.00 - 4.90 %   CoV-2, FLU A/B, and RSV by PCR (2-4 Hours CEPHEID) : Collect NP swab in VTM    Collection Time: 06/13/22  3:43 PM    Specimen: Nasopharyngeal; Respirate   Result Value Ref Range    Influenza virus A RNA Negative Negative    Influenza virus B, PCR Negative Negative    RSV, PCR Negative Negative    SARS-CoV-2 by PCR NotDetected     SARS-CoV-2 Source NP Swab        Imaging  CT-HEAD W/O   Final Result      1. Right parietal subarachnoid hemorrhage in two cortical sulci.   2. Right parietal scalp hematoma.   3. No calvarial fracture.   4. Chronic right-sided paranasal sinus disease.      Based solely on CT findings, the brain injury guideline category is mBIG 1.      SDH < 4mm   IPH < 4mm   SAH < 3 sulci and < 1mm      The original BIG retrospective analysis found radiographic progression in 0% of BIG 1 patients and 2.6% BIG 2.      I, Dr. Golden Weiss, discussed the results of this examination directly by phone with Dr. Evelin Oconnor on 6/13/2022 at 1637 hours.      DX-CHEST-PORTABLE (1 VIEW)   Final Result      No acute cardiac or pulmonary abnormalities are identified.      CT-CSPINE WITHOUT PLUS RECONS    (Results Pending)   EC-ECHOCARDIOGRAM COMPLETE W/O CONT    (Results Pending)       Assessment/Plan  * Seizure (HCC)- (present on admission)  Assessment &  Plan  Hx of seizure disorder as a child her current presentation is more likely associated with alcohol withdrawal reviewing her visits, increase in .8, plt 130's, mild transaminitis in 2:1 nature.     Treat for alcohol withdrawal, dex gtt and fixed dose of ativan.   Seizure precautions  Neurology consulted follow up on recommendations  CT pending    Tongue laceration  Assessment & Plan  Aspiration precautions  Monitor for worsening edema and airway protection currently seems unlikely with anterior tongue injury      Alcohol withdrawal (HCC)  Assessment & Plan  Currently when I saw she is not hypertensive or tachycardic with no tremor post ativan therapy  Continue to monitor with aggressive treatment dex gtt and scheduled IV ativan as well as PRN  Vitamins, alcohol cessation strictly recommended  Optimize electrolytes, mag, phos, k      Discussed patient condition and risk of morbidity and/or mortality with RN and Patient.      The patient remains critically ill alcohol withdrawal and seizure requiring IV ativan.  Critical care time = 38 minutes in directly providing and coordinating critical care and extensive data review.  No time overlap and excludes procedures.    Patient can go to IMCU for close follow up of seizure and if progression of alcohol withdrawal syndrome.

## 2022-06-13 NOTE — ED TRIAGE NOTES
.  Chief Complaint   Patient presents with   • Seizure      Pt BIB EMS from home, per report Pt had witness seizure. Post ictal upon EMS arrival. Pt has oral trauma to tongue noted, large laceration, removed large blood clot from mouth. Pt is able to control secretions and maintain airway, AAO x 3 minus event.    PT has Hx of seizures, last seizure was at age of 11.

## 2022-06-14 ENCOUNTER — APPOINTMENT (OUTPATIENT)
Dept: RADIOLOGY | Facility: MEDICAL CENTER | Age: 37
DRG: 894 | End: 2022-06-14
Attending: HOSPITALIST

## 2022-06-14 ENCOUNTER — HOSPITAL ENCOUNTER (INPATIENT)
Facility: REHABILITATION | Age: 37
End: 2022-06-14
Attending: PHYSICAL MEDICINE & REHABILITATION | Admitting: PHYSICAL MEDICINE & REHABILITATION
Payer: COMMERCIAL

## 2022-06-14 ENCOUNTER — APPOINTMENT (OUTPATIENT)
Dept: CARDIOLOGY | Facility: MEDICAL CENTER | Age: 37
DRG: 894 | End: 2022-06-14
Attending: STUDENT IN AN ORGANIZED HEALTH CARE EDUCATION/TRAINING PROGRAM

## 2022-06-14 ENCOUNTER — APPOINTMENT (OUTPATIENT)
Dept: RADIOLOGY | Facility: MEDICAL CENTER | Age: 37
DRG: 894 | End: 2022-06-14
Attending: SURGERY

## 2022-06-14 PROBLEM — E04.1 THYROID NODULE: Status: ACTIVE | Noted: 2022-06-14

## 2022-06-14 PROBLEM — D64.9 ANEMIA: Status: ACTIVE | Noted: 2022-06-14

## 2022-06-14 PROBLEM — D69.6 THROMBOCYTOPENIA (HCC): Status: ACTIVE | Noted: 2022-06-14

## 2022-06-14 PROBLEM — T14.90XA TRAUMA: Status: ACTIVE | Noted: 2022-06-14

## 2022-06-14 PROBLEM — D69.1 ABNORMAL PLATELET FUNCTION TEST (HCC): Status: ACTIVE | Noted: 2022-06-14

## 2022-06-14 LAB
ALBUMIN SERPL BCP-MCNC: 3.2 G/DL (ref 3.2–4.9)
ALBUMIN/GLOB SERPL: 1.2 G/DL
ALP SERPL-CCNC: 68 U/L (ref 30–99)
ALT SERPL-CCNC: 43 U/L (ref 2–50)
AMPHET UR QL SCN: NEGATIVE
ANION GAP SERPL CALC-SCNC: 10 MMOL/L (ref 7–16)
APPEARANCE UR: CLEAR
AST SERPL-CCNC: 97 U/L (ref 12–45)
BACTERIA #/AREA URNS HPF: NEGATIVE /HPF
BARBITURATES UR QL SCN: NEGATIVE
BASOPHILS # BLD AUTO: 0.8 % (ref 0–1.8)
BASOPHILS # BLD: 0.06 K/UL (ref 0–0.12)
BENZODIAZ UR QL SCN: NEGATIVE
BILIRUB SERPL-MCNC: 0.6 MG/DL (ref 0.1–1.5)
BILIRUB UR QL STRIP.AUTO: NEGATIVE
BUN SERPL-MCNC: 9 MG/DL (ref 8–22)
BZE UR QL SCN: NEGATIVE
CALCIUM SERPL-MCNC: 7.4 MG/DL (ref 8.5–10.5)
CANNABINOIDS UR QL SCN: NEGATIVE
CHLORIDE SERPL-SCNC: 107 MMOL/L (ref 96–112)
CO2 SERPL-SCNC: 20 MMOL/L (ref 20–33)
COLOR UR: YELLOW
CREAT SERPL-MCNC: 0.37 MG/DL (ref 0.5–1.4)
EOSINOPHIL # BLD AUTO: 0.06 K/UL (ref 0–0.51)
EOSINOPHIL NFR BLD: 0.8 % (ref 0–6.9)
EPI CELLS #/AREA URNS HPF: NEGATIVE /HPF
ERYTHROCYTE [DISTWIDTH] IN BLOOD BY AUTOMATED COUNT: 52.5 FL (ref 35.9–50)
GFR SERPLBLD CREATININE-BSD FMLA CKD-EPI: 134 ML/MIN/1.73 M 2
GLOBULIN SER CALC-MCNC: 2.6 G/DL (ref 1.9–3.5)
GLUCOSE SERPL-MCNC: 81 MG/DL (ref 65–99)
GLUCOSE UR STRIP.AUTO-MCNC: NEGATIVE MG/DL
HAV IGM SERPL QL IA: NORMAL
HBV CORE IGM SER QL: NORMAL
HBV SURFACE AG SER QL: NORMAL
HCT VFR BLD AUTO: 29.1 % (ref 37–47)
HCV AB SER QL: NORMAL
HGB BLD-MCNC: 9.7 G/DL (ref 12–16)
HIV 1+2 AB+HIV1 P24 AG SERPL QL IA: NORMAL
HYALINE CASTS #/AREA URNS LPF: NORMAL /LPF
IMM GRANULOCYTES # BLD AUTO: 0.05 K/UL (ref 0–0.11)
IMM GRANULOCYTES NFR BLD AUTO: 0.7 % (ref 0–0.9)
INR PPP: 1.09 (ref 0.87–1.13)
KETONES UR STRIP.AUTO-MCNC: NEGATIVE MG/DL
LEUKOCYTE ESTERASE UR QL STRIP.AUTO: NEGATIVE
LYMPHOCYTES # BLD AUTO: 0.96 K/UL (ref 1–4.8)
LYMPHOCYTES NFR BLD: 12.9 % (ref 22–41)
MAGNESIUM SERPL-MCNC: 1.7 MG/DL (ref 1.5–2.5)
MCH RBC QN AUTO: 35.8 PG (ref 27–33)
MCHC RBC AUTO-ENTMCNC: 33.3 G/DL (ref 33.6–35)
MCV RBC AUTO: 107.4 FL (ref 81.4–97.8)
METHADONE UR QL SCN: NEGATIVE
MICRO URNS: ABNORMAL
MONOCYTES # BLD AUTO: 0.52 K/UL (ref 0–0.85)
MONOCYTES NFR BLD AUTO: 7 % (ref 0–13.4)
NEUTROPHILS # BLD AUTO: 5.79 K/UL (ref 2–7.15)
NEUTROPHILS NFR BLD: 77.8 % (ref 44–72)
NITRITE UR QL STRIP.AUTO: NEGATIVE
NRBC # BLD AUTO: 0 K/UL
NRBC BLD-RTO: 0 /100 WBC
OPIATES UR QL SCN: NEGATIVE
OXYCODONE UR QL SCN: NEGATIVE
PCP UR QL SCN: NEGATIVE
PH UR STRIP.AUTO: 8 [PH] (ref 5–8)
PHOSPHATE SERPL-MCNC: 2 MG/DL (ref 2.5–4.5)
PLATELET # BLD AUTO: 85 K/UL (ref 164–446)
PMV BLD AUTO: 9.6 FL (ref 9–12.9)
POTASSIUM SERPL-SCNC: 4.4 MMOL/L (ref 3.6–5.5)
PROPOXYPH UR QL SCN: NEGATIVE
PROT SERPL-MCNC: 5.8 G/DL (ref 6–8.2)
PROT UR QL STRIP: 30 MG/DL
PROTHROMBIN TIME: 13.8 SEC (ref 12–14.6)
RBC # BLD AUTO: 2.71 M/UL (ref 4.2–5.4)
RBC # URNS HPF: NORMAL /HPF
RBC UR QL AUTO: NEGATIVE
SODIUM SERPL-SCNC: 134 MMOL/L (ref 135–145)
SODIUM SERPL-SCNC: 137 MMOL/L (ref 135–145)
SP GR UR STRIP.AUTO: 1.02
UROBILINOGEN UR STRIP.AUTO-MCNC: 1 MG/DL
WBC # BLD AUTO: 7.4 K/UL (ref 4.8–10.8)
WBC #/AREA URNS HPF: NORMAL /HPF

## 2022-06-14 PROCEDURE — 83735 ASSAY OF MAGNESIUM: CPT

## 2022-06-14 PROCEDURE — 770000 HCHG ROOM/CARE - INTERMEDIATE ICU *

## 2022-06-14 PROCEDURE — 99291 CRITICAL CARE FIRST HOUR: CPT | Performed by: HOSPITALIST

## 2022-06-14 PROCEDURE — 700102 HCHG RX REV CODE 250 W/ 637 OVERRIDE(OP): Performed by: NURSE PRACTITIONER

## 2022-06-14 PROCEDURE — A9270 NON-COVERED ITEM OR SERVICE: HCPCS | Performed by: STUDENT IN AN ORGANIZED HEALTH CARE EDUCATION/TRAINING PROGRAM

## 2022-06-14 PROCEDURE — 80053 COMPREHEN METABOLIC PANEL: CPT

## 2022-06-14 PROCEDURE — 84295 ASSAY OF SERUM SODIUM: CPT

## 2022-06-14 PROCEDURE — A9270 NON-COVERED ITEM OR SERVICE: HCPCS | Performed by: NURSE PRACTITIONER

## 2022-06-14 PROCEDURE — 84100 ASSAY OF PHOSPHORUS: CPT

## 2022-06-14 PROCEDURE — 700101 HCHG RX REV CODE 250: Performed by: STUDENT IN AN ORGANIZED HEALTH CARE EDUCATION/TRAINING PROGRAM

## 2022-06-14 PROCEDURE — 93306 TTE W/DOPPLER COMPLETE: CPT

## 2022-06-14 PROCEDURE — 80074 ACUTE HEPATITIS PANEL: CPT

## 2022-06-14 PROCEDURE — 85025 COMPLETE CBC W/AUTO DIFF WBC: CPT

## 2022-06-14 PROCEDURE — 700111 HCHG RX REV CODE 636 W/ 250 OVERRIDE (IP): Performed by: STUDENT IN AN ORGANIZED HEALTH CARE EDUCATION/TRAINING PROGRAM

## 2022-06-14 PROCEDURE — 70450 CT HEAD/BRAIN W/O DYE: CPT

## 2022-06-14 PROCEDURE — 700102 HCHG RX REV CODE 250 W/ 637 OVERRIDE(OP): Performed by: STUDENT IN AN ORGANIZED HEALTH CARE EDUCATION/TRAINING PROGRAM

## 2022-06-14 PROCEDURE — 80307 DRUG TEST PRSMV CHEM ANLYZR: CPT

## 2022-06-14 PROCEDURE — 700105 HCHG RX REV CODE 258: Performed by: STUDENT IN AN ORGANIZED HEALTH CARE EDUCATION/TRAINING PROGRAM

## 2022-06-14 PROCEDURE — 81001 URINALYSIS AUTO W/SCOPE: CPT

## 2022-06-14 PROCEDURE — 700102 HCHG RX REV CODE 250 W/ 637 OVERRIDE(OP): Performed by: HOSPITALIST

## 2022-06-14 PROCEDURE — 85610 PROTHROMBIN TIME: CPT

## 2022-06-14 PROCEDURE — 99233 SBSQ HOSP IP/OBS HIGH 50: CPT | Performed by: NURSE PRACTITIONER

## 2022-06-14 PROCEDURE — 99232 SBSQ HOSP IP/OBS MODERATE 35: CPT | Performed by: NURSE PRACTITIONER

## 2022-06-14 PROCEDURE — A9270 NON-COVERED ITEM OR SERVICE: HCPCS | Performed by: HOSPITALIST

## 2022-06-14 PROCEDURE — 87389 HIV-1 AG W/HIV-1&-2 AB AG IA: CPT

## 2022-06-14 RX ORDER — LORAZEPAM 2 MG/1
4 TABLET ORAL
Status: DISCONTINUED | OUTPATIENT
Start: 2022-06-14 | End: 2022-06-15

## 2022-06-14 RX ORDER — ONDANSETRON 2 MG/ML
4 INJECTION INTRAMUSCULAR; INTRAVENOUS EVERY 4 HOURS PRN
Status: DISCONTINUED | OUTPATIENT
Start: 2022-06-14 | End: 2022-06-17 | Stop reason: HOSPADM

## 2022-06-14 RX ORDER — AMOXICILLIN 250 MG
2 CAPSULE ORAL 2 TIMES DAILY
Status: DISCONTINUED | OUTPATIENT
Start: 2022-06-14 | End: 2022-06-17 | Stop reason: HOSPADM

## 2022-06-14 RX ORDER — LORAZEPAM 2 MG/1
2 TABLET ORAL EVERY 6 HOURS
Status: DISCONTINUED | OUTPATIENT
Start: 2022-06-14 | End: 2022-06-16

## 2022-06-14 RX ORDER — LORAZEPAM 0.5 MG/1
0.5 TABLET ORAL EVERY 4 HOURS PRN
Status: DISCONTINUED | OUTPATIENT
Start: 2022-06-14 | End: 2022-06-15

## 2022-06-14 RX ORDER — ACETAMINOPHEN 325 MG/1
650 TABLET ORAL EVERY 4 HOURS PRN
Status: DISCONTINUED | OUTPATIENT
Start: 2022-06-14 | End: 2022-06-17 | Stop reason: HOSPADM

## 2022-06-14 RX ORDER — LORAZEPAM 2 MG/ML
2 INJECTION INTRAMUSCULAR
Status: DISCONTINUED | OUTPATIENT
Start: 2022-06-14 | End: 2022-06-15

## 2022-06-14 RX ORDER — PROMETHAZINE HYDROCHLORIDE 25 MG/1
12.5-25 TABLET ORAL EVERY 4 HOURS PRN
Status: DISCONTINUED | OUTPATIENT
Start: 2022-06-14 | End: 2022-06-17 | Stop reason: HOSPADM

## 2022-06-14 RX ORDER — ACETAMINOPHEN 650 MG/1
650 SUPPOSITORY RECTAL EVERY 4 HOURS PRN
Status: DISCONTINUED | OUTPATIENT
Start: 2022-06-14 | End: 2022-06-17 | Stop reason: HOSPADM

## 2022-06-14 RX ORDER — LORAZEPAM 1 MG/1
1 TABLET ORAL EVERY 4 HOURS PRN
Status: DISCONTINUED | OUTPATIENT
Start: 2022-06-14 | End: 2022-06-15

## 2022-06-14 RX ORDER — LORAZEPAM 2 MG/ML
0.5 INJECTION INTRAMUSCULAR EVERY 4 HOURS PRN
Status: DISCONTINUED | OUTPATIENT
Start: 2022-06-14 | End: 2022-06-15

## 2022-06-14 RX ORDER — ONDANSETRON 4 MG/1
4 TABLET, ORALLY DISINTEGRATING ORAL EVERY 4 HOURS PRN
Status: DISCONTINUED | OUTPATIENT
Start: 2022-06-14 | End: 2022-06-17 | Stop reason: HOSPADM

## 2022-06-14 RX ORDER — LORAZEPAM 2 MG/ML
1 INJECTION INTRAMUSCULAR
Status: DISCONTINUED | OUTPATIENT
Start: 2022-06-14 | End: 2022-06-15

## 2022-06-14 RX ORDER — LORAZEPAM 2 MG/1
2 TABLET ORAL
Status: DISCONTINUED | OUTPATIENT
Start: 2022-06-14 | End: 2022-06-15

## 2022-06-14 RX ORDER — CHLORHEXIDINE GLUCONATE ORAL RINSE 1.2 MG/ML
15 SOLUTION DENTAL EVERY 4 HOURS
Status: DISCONTINUED | OUTPATIENT
Start: 2022-06-14 | End: 2022-06-17 | Stop reason: HOSPADM

## 2022-06-14 RX ORDER — POLYETHYLENE GLYCOL 3350 17 G/17G
1 POWDER, FOR SOLUTION ORAL
Status: DISCONTINUED | OUTPATIENT
Start: 2022-06-14 | End: 2022-06-17 | Stop reason: HOSPADM

## 2022-06-14 RX ORDER — LORAZEPAM 2 MG/ML
1.5 INJECTION INTRAMUSCULAR
Status: DISCONTINUED | OUTPATIENT
Start: 2022-06-14 | End: 2022-06-15

## 2022-06-14 RX ORDER — GUAIFENESIN/DEXTROMETHORPHAN 100-10MG/5
10 SYRUP ORAL EVERY 6 HOURS PRN
Status: DISCONTINUED | OUTPATIENT
Start: 2022-06-14 | End: 2022-06-17 | Stop reason: HOSPADM

## 2022-06-14 RX ORDER — BISACODYL 10 MG
10 SUPPOSITORY, RECTAL RECTAL
Status: DISCONTINUED | OUTPATIENT
Start: 2022-06-14 | End: 2022-06-17 | Stop reason: HOSPADM

## 2022-06-14 RX ADMIN — POTASSIUM CHLORIDE AND SODIUM CHLORIDE: 900; 300 INJECTION, SOLUTION INTRAVENOUS at 04:34

## 2022-06-14 RX ADMIN — LEVETIRACETAM 500 MG: 100 INJECTION, SOLUTION INTRAVENOUS at 06:00

## 2022-06-14 RX ADMIN — CHLORHEXIDINE GLUCONATE 0.12% ORAL RINSE 15 ML: 1.2 LIQUID ORAL at 21:56

## 2022-06-14 RX ADMIN — DIAZEPAM 10 MG: 5 TABLET ORAL at 00:45

## 2022-06-14 RX ADMIN — FOLIC ACID 1 MG: 5 INJECTION, SOLUTION INTRAMUSCULAR; INTRAVENOUS; SUBCUTANEOUS at 09:49

## 2022-06-14 RX ADMIN — CHLORHEXIDINE GLUCONATE 0.12% ORAL RINSE 15 ML: 1.2 LIQUID ORAL at 17:12

## 2022-06-14 RX ADMIN — LORAZEPAM 1 MG: 2 INJECTION INTRAMUSCULAR; INTRAVENOUS at 02:27

## 2022-06-14 RX ADMIN — LORAZEPAM 2 MG: 2 TABLET ORAL at 17:12

## 2022-06-14 RX ADMIN — LORAZEPAM 2 MG: 2 TABLET ORAL at 23:13

## 2022-06-14 RX ADMIN — CHLORHEXIDINE GLUCONATE 0.12% ORAL RINSE 15 ML: 1.2 LIQUID ORAL at 15:34

## 2022-06-14 RX ADMIN — THIAMINE HYDROCHLORIDE 100 MG: 100 INJECTION, SOLUTION INTRAMUSCULAR; INTRAVENOUS at 05:48

## 2022-06-14 RX ADMIN — LORAZEPAM 2 MG: 2 TABLET ORAL at 12:07

## 2022-06-14 RX ADMIN — LEVETIRACETAM 500 MG: 100 INJECTION, SOLUTION INTRAVENOUS at 17:13

## 2022-06-14 RX ADMIN — DEXMEDETOMIDINE 0.5 MCG/KG/HR: 200 INJECTION, SOLUTION INTRAVENOUS at 17:13

## 2022-06-14 RX ADMIN — DEXMEDETOMIDINE 0.2 MCG/KG/HR: 200 INJECTION, SOLUTION INTRAVENOUS at 00:34

## 2022-06-14 RX ADMIN — LORAZEPAM 2 MG: 2 INJECTION INTRAMUSCULAR; INTRAVENOUS at 00:43

## 2022-06-14 ASSESSMENT — LIFESTYLE VARIABLES
AGITATION: NORMAL ACTIVITY
NAUSEA AND VOMITING: NO NAUSEA AND NO VOMITING
AUDITORY DISTURBANCES: NOT PRESENT
VISUAL DISTURBANCES: NOT PRESENT
TOTAL SCORE: 14
TREMOR: *
ORIENTATION AND CLOUDING OF SENSORIUM: DATE DISORIENTATION BY NO MORE THAN TWO CALENDAR DAYS
PAROXYSMAL SWEATS: NO SWEAT VISIBLE
AUDITORY DISTURBANCES: NOT PRESENT
VISUAL DISTURBANCES: MODERATE SENSITIVITY
TREMOR: NO TREMOR
TREMOR: TREMOR NOT VISIBLE BUT CAN BE FELT, FINGERTIP TO FINGERTIP
AGITATION: SOMEWHAT MORE THAN NORMAL ACTIVITY
PAROXYSMAL SWEATS: BARELY PERCEPTIBLE SWEATING. PALMS MOIST
TOTAL SCORE: 29
NAUSEA AND VOMITING: NO NAUSEA AND NO VOMITING
AGITATION: NORMAL ACTIVITY
ANXIETY: NO ANXIETY (AT EASE)
TOTAL SCORE: MODERATE ITCHING, PINS AND NEEDLES SENSATION, BURNING OR NUMBNESS
VISUAL DISTURBANCES: NOT PRESENT
AUDITORY DISTURBANCES: NOT PRESENT
HEADACHE, FULLNESS IN HEAD: NOT PRESENT
HEADACHE, FULLNESS IN HEAD: VERY MILD
ANXIETY: *
NAUSEA AND VOMITING: NO NAUSEA AND NO VOMITING
NAUSEA AND VOMITING: NO NAUSEA AND NO VOMITING
ORIENTATION AND CLOUDING OF SENSORIUM: DISORIENTED FOR PLACE AND / OR PERSON
ANXIETY: MILDLY ANXIOUS
ANXIETY: *
AUDITORY DISTURBANCES: NOT PRESENT
TOTAL SCORE: VERY MILD ITCHING, PINS AND NEEDLES SENSATION, BURNING OR NUMBNESS
PAROXYSMAL SWEATS: NO SWEAT VISIBLE
ANXIETY: NO ANXIETY (AT EASE)
TREMOR: NO TREMOR
ORIENTATION AND CLOUDING OF SENSORIUM: DATE DISORIENTATION BY NO MORE THAN TWO CALENDAR DAYS
AUDITORY DISTURBANCES: MODERATE HARSHNESS OR ABILITY TO FRIGHTEN
TOTAL SCORE: 4
VISUAL DISTURBANCES: NOT PRESENT
AGITATION: *
ORIENTATION AND CLOUDING OF SENSORIUM: DATE DISORIENTATION BY NO MORE THAN TWO CALENDAR DAYS
VISUAL DISTURBANCES: NOT PRESENT
ORIENTATION AND CLOUDING OF SENSORIUM: DATE DISORIENTATION BY NO MORE THAN TWO CALENDAR DAYS
ANXIETY: NO ANXIETY (AT EASE)
AGITATION: NORMAL ACTIVITY
PAROXYSMAL SWEATS: NO SWEAT VISIBLE
TOTAL SCORE: 7
HEADACHE, FULLNESS IN HEAD: VERY MILD
NAUSEA AND VOMITING: NO NAUSEA AND NO VOMITING
TREMOR: TREMOR NOT VISIBLE BUT CAN BE FELT, FINGERTIP TO FINGERTIP
HEADACHE, FULLNESS IN HEAD: NOT PRESENT
NAUSEA AND VOMITING: *
TREMOR: TREMOR NOT VISIBLE BUT CAN BE FELT, FINGERTIP TO FINGERTIP
PAROXYSMAL SWEATS: *
TREMOR: NO TREMOR
PAROXYSMAL SWEATS: BARELY PERCEPTIBLE SWEATING. PALMS MOIST
TOTAL SCORE: 4
VISUAL DISTURBANCES: NOT PRESENT
AUDITORY DISTURBANCES: NOT PRESENT
AGITATION: NORMAL ACTIVITY
PAROXYSMAL SWEATS: *
HEADACHE, FULLNESS IN HEAD: MODERATE
TOTAL SCORE: 5
TOTAL SCORE: 5
ANXIETY: MILDLY ANXIOUS
ORIENTATION AND CLOUDING OF SENSORIUM: DISORIENTED FOR PLACE AND / OR PERSON
HEADACHE, FULLNESS IN HEAD: VERY MILD
AUDITORY DISTURBANCES: NOT PRESENT
ORIENTATION AND CLOUDING OF SENSORIUM: DATE DISORIENTATION BY NO MORE THAN TWO CALENDAR DAYS
HEADACHE, FULLNESS IN HEAD: MILD
AGITATION: MODERATELY FIDGETY AND RESTLESS
NAUSEA AND VOMITING: NO NAUSEA AND NO VOMITING
VISUAL DISTURBANCES: NOT PRESENT

## 2022-06-14 ASSESSMENT — FIBROSIS 4 INDEX: FIB4 SCORE: 5.28

## 2022-06-14 NOTE — THERAPY
PT Contact Note:    PT consult received. Pt with strict bedrest orders in place. Will initiate PT evaluation when medically appropriate and indicated and pt cleared for OOB mobility. Thanks    Tonia Sarmiento, PT, DPT    [place] [id number]

## 2022-06-14 NOTE — CARE PLAN
The patient is Watcher - Medium risk of patient condition declining or worsening    Shift Goals  Clinical Goals: neuro checks, stable vitals, seizure monitoring  Patient Goals: rest, drink water    Progress made toward(s) clinical / shift goals:    Problem: Optimal Care for Alcohol Withdrawal  Goal: Optimal Care for the alcohol withdrawal patient  Outcome: Progressing     Problem: Seizure Precautions  Goal: Implementation of seizure precautions  Outcome: Progressing     Problem: Skin Integrity  Goal: Skin integrity is maintained or improved  Outcome: Progressing     Problem: Fall Risk  Goal: Patient will remain free from falls  Outcome: Progressing     Problem: Safety - Medical Restraint  Goal: Remains free of injury from restraints (Restraint for Interference with Medical Device)  Outcome: Progressing       Patient is not progressing towards the following goals:  Problem: Knowledge Deficit - Standard  Goal: Patient and family/care givers will demonstrate understanding of plan of care, disease process/condition, diagnostic tests and medications  Outcome: Not Met     Problem: Lifestyle Changes  Goal: Patient's ability to identify lifestyle changes and available resources to help reduce recurrence of condition will improve  Outcome: Not Met     Problem: Psychosocial  Goal: Patient's level of anxiety will decrease  Outcome: Not Met  Goal: Spiritual and cultural needs incorporated into hospitalization  Outcome: Not Met     Problem: Risk for Aspiration  Goal: Patient's risk for aspiration will be absent or decrease  Outcome: Not Met     Problem: Safety - Medical Restraint  Goal: Free from restraint(s) (Restraint for Interference with Medical Device)  Outcome: Not Met

## 2022-06-14 NOTE — PROGRESS NOTES
Trauma / Surgical Daily Progress Note    Date of Service  6/14/2022    Chief Complaint  36 y.o. female admitted 6/13/2022 with a BIG 1 head injury and tongue laceration after a witnessed seizure and GLF    Interval Events  Limited tertiary survey completed  GCS 13  PERRLA  Tongue swollen and bruised    - Repeat head CT still pending from this morning  - Q4hr oral care  - Add Peridex  - Appreciate medicine admission and neurology consult  - Trauma will continue to follow    Review of Systems  Review of Systems   Unable to perform ROS: Mental acuity        Vital Signs  Temp:  [36.4 °C (97.5 °F)-37 °C (98.6 °F)] 36.4 °C (97.5 °F)  Pulse:  [] 91  Resp:  [15-58] 29  BP: (104-162)/(69-91) 108/77  SpO2:  [92 %-100 %] 98 %    Physical Exam  Physical Exam  Vitals and nursing note reviewed.   Constitutional:       Appearance: She is well-developed. She is ill-appearing. She is not toxic-appearing.   HENT:      Head: Normocephalic.      Right Ear: External ear normal.      Left Ear: External ear normal.      Nose:      Comments: Cortrak in place     Mouth/Throat:      Mouth: Mucous membranes are moist.      Comments: Tongue edema/ecchymosis  Bloody secretions suctioned from mouth and dried to chin  Eyes:      General: No scleral icterus.     Conjunctiva/sclera:      Right eye: Right conjunctiva is injected.      Left eye: Left conjunctiva is injected.      Pupils: Pupils are equal, round, and reactive to light.   Cardiovascular:      Rate and Rhythm: Normal rate and regular rhythm.      Pulses: Normal pulses.      Heart sounds: Normal heart sounds. No murmur heard.  Pulmonary:      Effort: Pulmonary effort is normal. No respiratory distress.      Breath sounds: Rhonchi present. No wheezing.   Chest:      Chest wall: No tenderness.   Abdominal:      General: Bowel sounds are normal. There is no distension.      Palpations: Abdomen is soft.      Tenderness: There is no abdominal tenderness. There is no guarding.    Genitourinary:     Comments: Godwin catheter in place with clear nilda urine  Musculoskeletal:         General: No swelling, tenderness, deformity or signs of injury.      Cervical back: Neck supple.      Comments: Moves all extremities   Skin:     General: Skin is warm and dry.      Coloration: Skin is not jaundiced.   Neurological:      GCS: GCS eye subscore is 3. GCS verbal subscore is 4. GCS motor subscore is 6.   Psychiatric:         Behavior: Behavior is uncooperative.         Laboratory  Recent Results (from the past 24 hour(s))   CBC WITH DIFFERENTIAL    Collection Time: 06/13/22  1:54 PM   Result Value Ref Range    WBC 8.6 4.8 - 10.8 K/uL    RBC 3.10 (L) 4.20 - 5.40 M/uL    Hemoglobin 11.0 (L) 12.0 - 16.0 g/dL    Hematocrit 32.8 (L) 37.0 - 47.0 %    .8 (H) 81.4 - 97.8 fL    MCH 35.5 (H) 27.0 - 33.0 pg    MCHC 33.5 (L) 33.6 - 35.0 g/dL    RDW 51.3 (H) 35.9 - 50.0 fL    Platelet Count 132 (L) 164 - 446 K/uL    MPV 9.7 9.0 - 12.9 fL    Neutrophils-Polys 85.10 (H) 44.00 - 72.00 %    Lymphocytes 9.60 (L) 22.00 - 41.00 %    Monocytes 3.50 0.00 - 13.40 %    Eosinophils 0.90 0.00 - 6.90 %    Basophils 0.90 0.00 - 1.80 %    Nucleated RBC 0.00 /100 WBC    Neutrophils (Absolute) 7.32 (H) 2.00 - 7.15 K/uL    Lymphs (Absolute) 0.83 (L) 1.00 - 4.80 K/uL    Monos (Absolute) 0.30 0.00 - 0.85 K/uL    Eos (Absolute) 0.08 0.00 - 0.51 K/uL    Baso (Absolute) 0.08 0.00 - 0.12 K/uL    NRBC (Absolute) 0.00 K/uL    Anisocytosis 1+     Macrocytosis 1+     Microcytosis 1+    COMP METABOLIC PANEL    Collection Time: 06/13/22  1:54 PM   Result Value Ref Range    Sodium 138 135 - 145 mmol/L    Potassium 3.5 (L) 3.6 - 5.5 mmol/L    Chloride 100 96 - 112 mmol/L    Co2 17 (L) 20 - 33 mmol/L    Anion Gap 21.0 (H) 7.0 - 16.0    Glucose 132 (H) 65 - 99 mg/dL    Bun 10 8 - 22 mg/dL    Creatinine 0.63 0.50 - 1.40 mg/dL    Calcium 9.1 8.5 - 10.5 mg/dL    AST(SGOT) 158 (H) 12 - 45 U/L    ALT(SGPT) 63 (H) 2 - 50 U/L    Alkaline  Phosphatase 94 30 - 99 U/L    Total Bilirubin 0.8 0.1 - 1.5 mg/dL    Albumin 3.9 3.2 - 4.9 g/dL    Total Protein 7.4 6.0 - 8.2 g/dL    Globulin 3.5 1.9 - 3.5 g/dL    A-G Ratio 1.1 g/dL   HCG Qual Serum    Collection Time: 06/13/22  1:54 PM   Result Value Ref Range    Beta-Hcg Qualitative Serum Negative Negative   DIAGNOSTIC ALCOHOL    Collection Time: 06/13/22  1:54 PM   Result Value Ref Range    Diagnostic Alcohol <10.1 <10.1 mg/dL   ESTIMATED GFR    Collection Time: 06/13/22  1:54 PM   Result Value Ref Range    GFR (CKD-EPI) 118 >60 mL/min/1.73 m 2   DIFFERENTIAL MANUAL    Collection Time: 06/13/22  1:54 PM   Result Value Ref Range    Manual Diff Status PERFORMED    PERIPHERAL SMEAR REVIEW    Collection Time: 06/13/22  1:54 PM   Result Value Ref Range    Peripheral Smear Review see below    PLATELET ESTIMATE    Collection Time: 06/13/22  1:54 PM   Result Value Ref Range    Plt Estimation Decreased    MORPHOLOGY    Collection Time: 06/13/22  1:54 PM   Result Value Ref Range    RBC Morphology Present     Large Platelets 1+     Toxic Gran Slight    COD - Adult (Type and Screen)    Collection Time: 06/13/22  1:54 PM   Result Value Ref Range    ABO Grouping Only AB     Rh Grouping Only POS     Antibody Screen-Cod NEG    CARBOXYHEMOGLOBIN (carbon monoxide)    Collection Time: 06/13/22  3:08 PM   Result Value Ref Range    Carbon Monoxide-Co 2.00 0.00 - 4.90 %   CoV-2, FLU A/B, and RSV by PCR (2-4 Hours CEPHEID) : Collect NP swab in VTM    Collection Time: 06/13/22  3:43 PM    Specimen: Nasopharyngeal; Respirate   Result Value Ref Range    Influenza virus A RNA Negative Negative    Influenza virus B, PCR Negative Negative    RSV, PCR Negative Negative    SARS-CoV-2 by PCR NotDetected     SARS-CoV-2 Source NP Swab    ACETAMINOPHEN    Collection Time: 06/13/22  5:42 PM   Result Value Ref Range    Acetaminophen -Tylenol <5.0 (L) 10.0 - 30.0 ug/mL   Salicylate    Collection Time: 06/13/22  5:42 PM   Result Value Ref Range     Salicylates, Quant. <1.0 (L) 15.0 - 25.0 mg/dL   PLATELET MAPPING WITH BASIC TEG    Collection Time: 06/13/22  5:42 PM   Result Value Ref Range    Reaction Time Initial-R 5.8 4.6 - 9.1 min    React Time Initial Hep 5.6 4.3 - 8.3 min    Clot Kinetics-K 1.1 0.8 - 2.1 min    Clot Angle-Angle 74.8 63.0 - 78.0 degrees    Maximum Clot Strength-MA 60.4 52.0 - 69.0 mm    TEG Functional Fibrinogen(MA) 31.3 15.0 - 32.0 mm    % Inhibition ADP 71.3 (H) 0.0 - 17.0 %    % Inhibition AA 97.5 (H) 0.0 - 11.0 %    TEG Algorithm Link Algorithm    ABO Rh Confirm    Collection Time: 06/13/22  5:42 PM   Result Value Ref Range    ABO Rh Confirm AB POS    Sodium Serum (NA)    Collection Time: 06/13/22  5:42 PM   Result Value Ref Range    Sodium 137 135 - 145 mmol/L   Lipid Profile    Collection Time: 06/13/22  5:42 PM   Result Value Ref Range    Cholesterol,Tot 168 100 - 199 mg/dL    Triglycerides 63 0 - 149 mg/dL    HDL 66 >=40 mg/dL    LDL 89 <100 mg/dL   CREATINE KINASE    Collection Time: 06/13/22  5:42 PM   Result Value Ref Range    CPK Total 811 (H) 0 - 154 U/L   Sodium Serum (NA)    Collection Time: 06/13/22  9:53 PM   Result Value Ref Range    Sodium 139 135 - 145 mmol/L   URINALYSIS CULTURE, IF INDICATED    Collection Time: 06/14/22 12:04 AM    Specimen: Urine   Result Value Ref Range    Color Yellow     Character Clear     Specific Gravity 1.020 <1.035    Ph 8.0 5.0 - 8.0    Glucose Negative Negative mg/dL    Ketones Negative Negative mg/dL    Protein 30 (A) Negative mg/dL    Bilirubin Negative Negative    Urobilinogen, Urine 1.0 Negative    Nitrite Negative Negative    Leukocyte Esterase Negative Negative    Occult Blood Negative Negative    Micro Urine Req Microscopic    URINE DRUG SCREEN    Collection Time: 06/14/22 12:04 AM   Result Value Ref Range    Amphetamines Urine Negative Negative    Barbiturates Negative Negative    Benzodiazepines Negative Negative    Cocaine Metabolite Negative Negative    Methadone Negative  Negative    Opiates Negative Negative    Oxycodone Negative Negative    Phencyclidine -Pcp Negative Negative    Propoxyphene Negative Negative    Cannabinoid Metab Negative Negative   URINE MICROSCOPIC (W/UA)    Collection Time: 06/14/22 12:04 AM   Result Value Ref Range    WBC 0-2 /hpf    RBC 0-2 /hpf    Bacteria Negative None /hpf    Epithelial Cells Negative /hpf    Hyaline Cast 0-2 /lpf   CBC WITH DIFFERENTIAL    Collection Time: 06/14/22  2:51 AM   Result Value Ref Range    WBC 7.4 4.8 - 10.8 K/uL    RBC 2.71 (L) 4.20 - 5.40 M/uL    Hemoglobin 9.7 (L) 12.0 - 16.0 g/dL    Hematocrit 29.1 (L) 37.0 - 47.0 %    .4 (H) 81.4 - 97.8 fL    MCH 35.8 (H) 27.0 - 33.0 pg    MCHC 33.3 (L) 33.6 - 35.0 g/dL    RDW 52.5 (H) 35.9 - 50.0 fL    Platelet Count 85 (L) 164 - 446 K/uL    MPV 9.6 9.0 - 12.9 fL    Neutrophils-Polys 77.80 (H) 44.00 - 72.00 %    Lymphocytes 12.90 (L) 22.00 - 41.00 %    Monocytes 7.00 0.00 - 13.40 %    Eosinophils 0.80 0.00 - 6.90 %    Basophils 0.80 0.00 - 1.80 %    Immature Granulocytes 0.70 0.00 - 0.90 %    Nucleated RBC 0.00 /100 WBC    Neutrophils (Absolute) 5.79 2.00 - 7.15 K/uL    Lymphs (Absolute) 0.96 (L) 1.00 - 4.80 K/uL    Monos (Absolute) 0.52 0.00 - 0.85 K/uL    Eos (Absolute) 0.06 0.00 - 0.51 K/uL    Baso (Absolute) 0.06 0.00 - 0.12 K/uL    Immature Granulocytes (abs) 0.05 0.00 - 0.11 K/uL    NRBC (Absolute) 0.00 K/uL   Comp Metabolic Panel    Collection Time: 06/14/22  2:51 AM   Result Value Ref Range    Sodium 137 135 - 145 mmol/L    Potassium 4.4 3.6 - 5.5 mmol/L    Chloride 107 96 - 112 mmol/L    Co2 20 20 - 33 mmol/L    Anion Gap 10.0 7.0 - 16.0    Glucose 81 65 - 99 mg/dL    Bun 9 8 - 22 mg/dL    Creatinine 0.37 (L) 0.50 - 1.40 mg/dL    Calcium 7.4 (L) 8.5 - 10.5 mg/dL    AST(SGOT) 97 (H) 12 - 45 U/L    ALT(SGPT) 43 2 - 50 U/L    Alkaline Phosphatase 68 30 - 99 U/L    Total Bilirubin 0.6 0.1 - 1.5 mg/dL    Albumin 3.2 3.2 - 4.9 g/dL    Total Protein 5.8 (L) 6.0 - 8.2 g/dL     Globulin 2.6 1.9 - 3.5 g/dL    A-G Ratio 1.2 g/dL   MAGNESIUM    Collection Time: 06/14/22  2:51 AM   Result Value Ref Range    Magnesium 1.7 1.5 - 2.5 mg/dL   PHOSPHORUS    Collection Time: 06/14/22  2:51 AM   Result Value Ref Range    Phosphorus 2.0 (L) 2.5 - 4.5 mg/dL   Prothrombin Time    Collection Time: 06/14/22  2:51 AM   Result Value Ref Range    PT 13.8 12.0 - 14.6 sec    INR 1.09 0.87 - 1.13   HEPATITIS PANEL ACUTE(4 COMPONENTS)    Collection Time: 06/14/22  2:51 AM   Result Value Ref Range    Hepatitis B Surface Antigen Non-Reactive Non-Reactive    Hepatitis B Cors Ab,IgM Non-Reactive Non-Reactive    Hepatitis A Virus Ab, IgM Non-Reactive Non-Reactive    Hepatitis C Antibody Non-Reactive Non-Reactive   HIV AG/AB COMBO ASSAY SCREENING    Collection Time: 06/14/22  2:51 AM   Result Value Ref Range    HIV Ag/Ab Combo Assay Non-Reactive Non Reactive   ESTIMATED GFR    Collection Time: 06/14/22  2:51 AM   Result Value Ref Range    GFR (CKD-EPI) 134 >60 mL/min/1.73 m 2   Sodium Serum (NA)    Collection Time: 06/14/22 12:04 PM   Result Value Ref Range    Sodium 134 (L) 135 - 145 mmol/L       Fluids    Intake/Output Summary (Last 24 hours) at 6/14/2022 1327  Last data filed at 6/14/2022 0800  Gross per 24 hour   Intake 2859.94 ml   Output 1565 ml   Net 1294.94 ml       Core Measures & Quality Metrics  Labs reviewed, Medications reviewed and Radiology images reviewed  Godwin catheter: Critically Ill - Requiring Accurate Measurement of Urinary Output      DVT Prophylaxis: Contraindicated - High bleeding risk  DVT prophylaxis - mechanical: SCDs  Ulcer prophylaxis: Yes        RAP Score Total: 5    ETOH Screening     Reason for no ETOH Intervention: Traumatic Brain Injury  Assesment ETOH: Admission BA negative, CAGE not completed, unable to complete screening.        Assessment/Plan  * SAH (subarachnoid hemorrhage) (HCC)- (present on admission)  Assessment & Plan  Right parietal subarachnoid hemorrhage in two cortical  sulci.  BIG 1.  No ASA, NSAIDS, or anticoagulants for 2 weeks.  Neurosurgery consultation not indicated.   6/14 Repeat head CT pending.    Abnormal platelet function test (HCC)- (present on admission)  Assessment & Plan  Admission AA 97.5%.    Seizure (HCC)- (present on admission)  Assessment & Plan  History of seizures treated with antiepileptics as a child.  No recent seizure or medications.  Keppra initiated upon admission.  Mike Martinez M.D. Neurology.    Tongue laceration- (present on admission)  Assessment & Plan  ERP discussed with plastics. No indication for suture repair.  Oral hygiene.  6/14 Add Peridex Q4hrs with oral care.    Trauma- (present on admission)  Assessment & Plan  Witnessed seizure and presumed GLF.  Non Trauma Activation.  Esteban Michaud MD. Trauma Surgery.    Thyroid nodule- (present on admission)  Assessment & Plan  Incidental finding of 1.2 cm hypodense right thyroid nodule.  Recommend outpatient follow up.    Mental status adequate for full examination?: No    Spine cleared (radiologically and/or clinically): Yes    All current laboratory studies/radiology exams reviewed: No, repeat CT head in process    Completed Consultations:  Dr. Martinez, neurology  Dr. Lemon, critical care  Dr. Colin, hospital medicine    Pending Consultations:  none    Newly Identified Diagnoses and Injuries:  Mental status change this morning with CT pending      Discussed patient condition with RN, Patient and trauma surgery. Dr. Michaud

## 2022-06-14 NOTE — PROGRESS NOTES
4 Eyes Skin Assessment Completed by CAROL Regalado and CAROL Delvalle.    Head WDL  Ears WDL  Nose WDL  Mouth Redness and Bleeding  Neck WDL  Breast/Chest WDL  Shoulder Blades WDL  Spine WDL  (R) Arm/Elbow/Hand WDL  (L) Arm/Elbow/Hand WDL  Abdomen WDL  Groin WDL  Scrotum/Coccyx/Buttocks WDL  (R) Leg WDL  (L) Leg WDL  (R) Heel/Foot/Toe WDL  (L) Heel/Foot/Toe WDL          Devices In Places ECG, Blood Pressure Cuff, Pulse Ox and Nasal Cannula      Interventions In Place Pillows    Possible Skin Injury Yes    Pictures Uploaded Into Epic No, needs to be completed  Wound Consult Placed N/A  RN Wound Prevention Protocol Ordered No

## 2022-06-14 NOTE — ASSESSMENT & PLAN NOTE
Likely secondary to ETOH withdrawal  She has a history of childhood seizures  Neurology consulted  Keppra 500mg IV BID changed to cortrak on 6/15  Seizure precautions  PRN ativan for seizure   She does not have a drivers license

## 2022-06-14 NOTE — ASSESSMENT & PLAN NOTE
Severe requiring an IV Precedex drip  IV Detox bag was given  Scheduled ativan via cortrak will be stopped on 6/16  She is at risk of refeeding syndrome thus follow phosphorus, magnesium, potassium levels daily and replace accordingly  Thiamine replacement   consulted for outpatient detox options.

## 2022-06-14 NOTE — DISCHARGE PLANNING
Following for post acute services pending medical clearance for therapy evaluations, Physiatry consult and return to community support verification.

## 2022-06-14 NOTE — ED NOTES
Report given at bedside to IMCU CAROL Regalado. Patient to floor via gurney with full vitals monitoring in place accompanied by IMCU RN. All belongings accounted for.   O-T Plasty Text: The defect edges were debeveled with a #15 scalpel blade.  Given the location of the defect, shape of the defect and the proximity to free margins an O-T plasty was deemed most appropriate.  Using a sterile surgical marker, an appropriate O-T plasty was drawn incorporating the defect and placing the expected incisions within the relaxed skin tension lines where possible.    The area thus outlined was incised deep to adipose tissue with a #15 scalpel blade.  The skin margins were undermined to an appropriate distance in all directions utilizing iris scissors.

## 2022-06-14 NOTE — CONSULTS
Neurology Initial Consult H&P  [facility] Neurology    Referring Physician: Evelin Freed M.D.    Chief Complaint   Patient presents with   • Seizure       History of Present Illness: Franchesca Ortez is a 36 y.o. female with history of alcohol abuse, depression, remote history of seizures with last seizure at age 11, and prior suicide attempt presenting to the hospital after having a witnessed seizure and for which neurology was consulted for seizure management. The patient is currently sedated/post-ictal and unable to provide history, thus is obtained via chart review and EPR/ER RN report. Per report, the patient presented to [facility] today via EMS from home after having a witnessed seizure, post-ictal with no memory of the event upon arrival and a large tongue laceration. Seizure was described by bystanders as generalized tonic-clonic and self-limiting. She was noted to be agitated, shaking, and when asked about her alcohol consumption given concern for withdrawal, patient fell backward and had a witnessed generalized tonic-clonic seizure lasting approximately 45 seconds with urinary incontinence and tongue biting. Ativan IV and Keppra 2000mg load was given. CT head wo contrast revealed a small right parietal subarachnoid hemorrhage with adjacent right parietal scalp hematoma, mBIG 1. Per ERP, the patient has intermittently become shaky with tachycardia and agitation while in the ER, and has received Ativan 6mg total thus far for presumed alcohol withdrawal.    Past medical history:   Past Medical History:   Diagnosis Date   • Alcohol abuse    • Depression    • History of suicide attempt        Past surgical history:   Past Surgical History:   Procedure Laterality Date   • GYN SURGERY      c section   • PRIMARY C SECTION         Family history:   Family History   Problem Relation Age of Onset   • Hypertension Father    • Diabetes Father    • Cancer Maternal Grandmother         liver       Social history:    Social History     Socioeconomic History   • Marital status:      Spouse name: Not on file   • Number of children: Not on file   • Years of education: Not on file   • Highest education level: Not on file   Occupational History   • Not on file   Tobacco Use   • Smoking status: Current Every Day Smoker     Packs/day: 0.50     Years: 15.00     Pack years: 7.50     Types: Cigarettes   • Smokeless tobacco: Never Used   • Tobacco comment: 3/4 pk a day   Substance and Sexual Activity   • Alcohol use: No   • Drug use: No   • Sexual activity: Yes     Partners: Male     Birth control/protection: I.U.D.   Other Topics Concern   • Not on file   Social History Narrative   • Not on file     Social Determinants of Health     Financial Resource Strain: Not on file   Food Insecurity: Not on file   Transportation Needs: Not on file   Physical Activity: Not on file   Stress: Not on file   Social Connections: Not on file   Intimate Partner Violence: Not on file   Housing Stability: Not on file       Allergies:  No Known Allergies    Medications:    Current Facility-Administered Medications:   •  PROPOFOL 10 MG/ML IV EMUL, , , ,   No current outpatient medications on file.    Review of systems: Unable to obtain as patient is sedated, post-ictal, and with limited verbal output.     Physical Examination:     Vitals:    06/13/22 1500 06/13/22 1530 06/13/22 1600 06/13/22 1605   BP: (!) 162/69 (!) 151/91 133/72    Pulse: 96 (!) 141 (!) 139 (!) 136   Resp: (!) 54 (!) 24 20    Temp:       TempSrc:       SpO2: 95% 98% 97%    Weight:       Height:           General: Patient in no acute distress.  HEENT: Normocephalic, unable to visualize right parietal hematoma due to matted hair obstructing the scalp.   Neck: Supple, no meningeal signs or carotid bruits. There is normal range of motion. No tenderness on exam.   Chest: Regular and unlabored breaths on RA. No cough.   CV: ST, normotensive.   Skin: No signs of acute rashes or trauma.    Musculoskeletal: Joints exhibit full range of motion, without any pain to palpation. There are no signs of joint or muscle swelling. There is no tenderness to deep palpation of muscles.   Psychiatric: No hallucinatory behavior. Mood and affect appear sedated and withdrawn on exam.     NEUROLOGICAL EXAM:   Mental status, orientation: Somnolent/sedated, opens eyes to noxious stimuli, following commands, and oriented to self and place only.   Speech and language: Speech is limited, moderately dysarthric and fluent.    Cranial nerve exam: Pupils are 4 mm bilaterally and equally reactive to light. Visual fields are intact by confrontation.  There is no nystagmus on primary or secondary gaze. VOR intact. Face appears symmetric. Tongue is midline and with large ventral laceration and thick bloody candi c/w tongue biting. Sternocleidomastoid muscles exhibit is normal strength bilaterally. Shoulder shrug is intact bilaterally.   Motor exam: Strength is antigravity in all extremities without drift noted, limited exam given sedation. Tone is normal. No abnormal movements were seen on exam.   Sensory exam Withdrawals to noxious stimuli equally in all extremities.   Deep tendon reflexes: Plantar responses are flexor. There is no clonus.   Coordination: No fatuma ataxia with observed movements.   Gait: Deferred as patient is unable at this time.       ANCILLARY DATA REVIEWED:     Labs:  Lab Results   Component Value Date/Time    PROTHROMBTM 13.1 09/21/2018 02:24 AM    INR 0.98 09/21/2018 02:24 AM      Lab Results   Component Value Date/Time    WBC 8.6 06/13/2022 01:54 PM    RBC 3.10 (L) 06/13/2022 01:54 PM    HEMOGLOBIN 11.0 (L) 06/13/2022 01:54 PM    HEMATOCRIT 32.8 (L) 06/13/2022 01:54 PM    .8 (H) 06/13/2022 01:54 PM    MCH 35.5 (H) 06/13/2022 01:54 PM    MCHC 33.5 (L) 06/13/2022 01:54 PM    MPV 9.7 06/13/2022 01:54 PM    NEUTSPOLYS 85.10 (H) 06/13/2022 01:54 PM    LYMPHOCYTES 9.60 (L) 06/13/2022 01:54 PM    MONOCYTES  3.50 06/13/2022 01:54 PM    EOSINOPHILS 0.90 06/13/2022 01:54 PM    BASOPHILS 0.90 06/13/2022 01:54 PM    ANISOCYTOSIS 1+ 06/13/2022 01:54 PM      Lab Results   Component Value Date/Time    SODIUM 138 06/13/2022 01:54 PM    POTASSIUM 3.5 (L) 06/13/2022 01:54 PM    CHLORIDE 100 06/13/2022 01:54 PM    CO2 17 (L) 06/13/2022 01:54 PM    GLUCOSE 132 (H) 06/13/2022 01:54 PM    BUN 10 06/13/2022 01:54 PM    CREATININE 0.63 06/13/2022 01:54 PM      Lab Results   Component Value Date/Time    CHOLSTRLTOT 181 03/01/2018 02:53 PM     (H) 03/01/2018 02:53 PM    HDL 45 03/01/2018 02:53 PM    TRIGLYCERIDE 121 03/01/2018 02:53 PM       Lab Results   Component Value Date/Time    ALKPHOSPHAT 94 06/13/2022 01:54 PM    ASTSGOT 158 (H) 06/13/2022 01:54 PM    ALTSGPT 63 (H) 06/13/2022 01:54 PM    TBILIRUBIN 0.8 06/13/2022 01:54 PM        Imaging/Testing:    I interpreted and/or reviewed the patient's neuroimaging    CT-HEAD W/O   Final Result      1. Right parietal subarachnoid hemorrhage in two cortical sulci.   2. Right parietal scalp hematoma.   3. No calvarial fracture.   4. Chronic right-sided paranasal sinus disease.      Based solely on CT findings, the brain injury guideline category is mBIG 1.      SDH < 4mm   IPH < 4mm   SAH < 3 sulci and < 1mm      The original BIG retrospective analysis found radiographic progression in 0% of BIG 1 patients and 2.6% BIG 2.      I, Dr. Golden Weiss, discussed the results of this examination directly by phone with Dr. Evelin Oconnor on 6/13/2022 at 1637 hours.      DX-CHEST-PORTABLE (1 VIEW)   Final Result      No acute cardiac or pulmonary abnormalities are identified.            Assessment and Plan:    Franchesca Ortez is a 36 y.o. female with relevant history of alcohol abuse, depression, remote history of seizures with last seizure at age 11, and prior suicide attempt presenting to the hospital after having a witnessed seizure and for which neurology was consulted for seizure  management. The witnessed seizures as most likely associated with acute alcohol withdrawal given her history of alcohol abuse, diagnostic alcohol <10.1, and reported agitation and shakiness improved with Ativan PRN. The right parietal SAH with associated right parietal scalp hematoma is consistent with traumatic etiology; however, given the increased risk of seizure with SAH will place her on AED as well with maintenance Keppra 500mg q12hr. Trauma service is to be informed by ERP given mBIG 1 criteria. Recommend admission for CIWA protocol and observation for monitoring to ensure no further seizures which would change mangagement.     Plan:    - Agree with Trauma service consultation  - Q4hr and PRN neurological assessments and vital sign assessments  - BP goal <140/90; control with nicardipine gtt if needed  - No significant mass effect on CTH, maintain eunatremia Na goal 135-145  - CIWA protocol per primary team  - Keppra 500mg IVPB q12hr. May transition to PO 1:1 once patient awakens and is able to tolerate oral intake  - Hold all antiplatelets and anticoagulants  - HOB at 30 degrees  - Seizure and fall precautions  - Report to DMV as patient is not to drive for 6 months following seizure per [place] state law  - DVT ppx: SCDs    The evaluation of the patient, and recommended management, was discussed with Dr. Martinez, Dr. Freed, Dr. Colin, and bedside RN.     Mike Mendoza, MSN [facility]  Nurse Practitioner  [facility]  (t) [phone]

## 2022-06-14 NOTE — ASSESSMENT & PLAN NOTE
Hx of seizure disorder as a child her current presentation is more likely associated with alcohol withdrawal reviewing her visits, increase in .8, plt 130's, mild transaminitis in 2:1 nature.     Treat for alcohol withdrawal, dex gtt and fixed dose of ativan.   Seizure precautions  Neurology consulted follow up on recommendations  CT pending

## 2022-06-14 NOTE — ASSESSMENT & PLAN NOTE
Aspiration precautions  Monitor for worsening edema and airway protection currently seems unlikely with anterior tongue injury

## 2022-06-14 NOTE — CONSULTS
TRAUMA Consult    CHIEF COMPLAINT: Seizure, traumatic brain injury    HISTORY OF PRESENT ILLNESS: Franchesca Ortez is a very pleasant 36-year-old female.  Presented to the emergency department after witnessed seizure .  Report history of prior seizures.  Report last seizure age of 11.  She states she does not recall what happened.  She states she is here because of seizure.  She reports having bit her tongue.  She is slow to answer but with persistent questioning she is oriented to person place and situation.  She is able to follow requests.   She reports feeling confused.  She states no headache no change in vision.  She states no neck pain numbness tingling weakness in her body.  She states no chest pain or difficulty breathing.  She states no abdominal pain or discomfort.  She states no pain in her extremities.  Reports tongue injury during seizure.   She was evaluated in the emergency department.  They have consulted facial trauma, neurology and critical care medicine as above with plan to admit.  She received further evaluation with CT scan.  Trauma was consulted due to finding of traumatic brain injury as below    PAST MEDICAL HISTORY:  has a past medical history of Alcohol abuse, Depression, and History of suicide attempt.     PAST SURGICAL HISTORY:  has a past surgical history that includes gyn surgery and primary c section.    ALLERGIES: No Known Allergies   CURRENT MEDICATIONS:    Home Medications     Reviewed by Glenny Forde (Pharmacy Tech) on 06/13/22 at 1640  Med List Status: Complete   Medication Last Dose Status        Patient Mauricio Taking any Medications                     FAMILY HISTORY: family history includes Cancer in her maternal grandmother; Diabetes in her father; Hypertension in her father.    SOCIAL HISTORY:  reports that she has been smoking cigarettes. She has a 7.50 pack-year smoking history. She has never used smokeless tobacco. She reports that she does not drink alcohol and  "does not use drugs.    REVIEW OF SYSTEMS: She reports feeling confused.  She states no headache no change in vision.  She states no neck pain numbness tingling weakness in her body.  She states no chest pain or difficulty breathing.  She states no abdominal pain or discomfort.  She states no pain in her extremities.  Reports tongue injury during seizure.      PHYSICAL EXAMINATION:     CONSTITUTIONAL:     Vital Signs: /78   Pulse 96   Temp 36.8 °C (98.3 °F) (Temporal)   Resp (!) 31   Ht 1.651 m (5' 5\")   Wt 70.3 kg (155 lb)   SpO2 98%    General Appearance: Awake interactive   HEENT:    Laceration to tongue no active bleeding no bleeding ears nose or mouth  NECK:    Trachea midline no stridor.  No tenderness.   RESPIRATORY:   Breathing with ease no cough no distress no tenderness no deformity on exam.  Breath sounds equal bilateral  CARDIOVASCULAR:   Regular rate skin warm brisk capillary refill.  ABDOMEN:   Soft nontender nondistended no guarding no rebound  MUSCULOSKELETAL:   No deformity no tenderness on exam  SKIN:    Appropriate color and temperature  NEUROLOGIC:    GCS 15 oriented to person place and situation.  PSYCHIATRIC:   Altered mental status slow to answer questions as received for medications for seizure as above    LABORATORY VALUES:   Recent Labs     06/13/22  1354   WBC 8.6   RBC 3.10*   HEMOGLOBIN 11.0*   HEMATOCRIT 32.8*   .8*   MCH 35.5*   MCHC 33.5*   RDW 51.3*   PLATELETCT 132*   MPV 9.7     Recent Labs     06/13/22  1354   SODIUM 138   POTASSIUM 3.5*   CHLORIDE 100   CO2 17*   GLUCOSE 132*   BUN 10   CREATININE 0.63   CALCIUM 9.1     Recent Labs     06/13/22  1354   ASTSGOT 158*   ALTSGPT 63*   TBILIRUBIN 0.8   ALKPHOSPHAT 94   GLOBULIN 3.5            IMAGING:   CT-CSPINE WITHOUT PLUS RECONS   Final Result      1.  No acute fracture is identified.      2.  Right frontal, ethmoid, and maxillary sinus disease.      3.  1.2 cm hypodense right thyroid nodule      CT-HEAD W/O "   Final Result      1. Right parietal subarachnoid hemorrhage in two cortical sulci.   2. Right parietal scalp hematoma.   3. No calvarial fracture.   4. Chronic right-sided paranasal sinus disease.      Based solely on CT findings, the brain injury guideline category is mBIG 1.      SDH < 4mm   IPH < 4mm   SAH < 3 sulci and < 1mm      The original BIG retrospective analysis found radiographic progression in 0% of BIG 1 patients and 2.6% BIG 2.      I, Dr. Golden Weiss, discussed the results of this examination directly by phone with Dr. Evelin Oconnor on 6/13/2022 at 1637 hours.      DX-CHEST-PORTABLE (1 VIEW)   Final Result      No acute cardiac or pulmonary abnormalities are identified.      EC-ECHOCARDIOGRAM COMPLETE W/O CONT    (Results Pending)       IMPRESSION AND PLAN:     Active Hospital Problems    Diagnosis    • Seizure (HCC) [R56.9]    • Alcohol withdrawal (HCC) [F10.239]    • Tongue laceration [S01.512A]    Traumatic brain injury big 1    DISPOSITION:    Care as directed by the medical service and neurology seizure control evaluation  Traumatic brain injury big 1  Close monitoring support  Pain control as needed  Pulmonary hygiene  Tongue laceration per Dr. Brennan airway    Plan tertiary survey      Discussed with ED provider  ____________________________________   Esteban Michaud M.D.    DD: 6/13/2022  5:36 PM

## 2022-06-14 NOTE — PROGRESS NOTES
Hospital Medicine Daily Progress Note    Date of Service  6/14/2022    Chief Complaint  Franchesca Ortez is a 36 y.o. female admitted 6/13/2022 with seizure.    Hospital Course  Ms. Ortez is a 36 y.o. female with history of mood disorder, alcohol dependence, seizure disorder, fibromyalgia, prior suicide attempt who presented 6/13/2022 with evaluation for seizure.  History limited as patient appears somnolent after receiving Ativan and also likely in postictal confusion state.  Per ER staff, patient had another seizure episode in the ER which was resolved with 2 mg IV Ativan administration.  CT head noted right parietal SAH, right parietal scalp hematoma, no calvarial fracture.  Trauma service, neurology services were consulted.  ICU was also consulted --recommended IMCU admission.  Admitted to medicine service.        Interval Problem Update  6/14: Ms. Ortez was evaluated and examined in the IMCU. Due to alcohol withdrawal, she is requiring an IV Precedex drip at 0.3. She is unable to safely swallow due to tongue swelling and detox thus a cortrak has been placed for meds and feeding. CT of the head showed some improvement of the SAH.    I have discussed this patient's plan of care and discharge plan at IDT rounds today with Case Management, Nursing, Nursing leadership, and other members of the IDT team.    Consultants/Specialty  Neurology   Critical care  Trauma   Code Status  Full Code    Disposition  Patient is not medically cleared for discharge.   Anticipate discharge to be determined  I have placed the appropriate orders for post-discharge needs.    Review of Systems  Review of Systems   Unable to perform ROS: Acuity of condition        Physical Exam  Temp:  [36.6 °C (97.8 °F)-37 °C (98.6 °F)] 37 °C (98.6 °F)  Pulse:  [] 101  Resp:  [15-58] 28  BP: (104-162)/(69-91) 104/72  SpO2:  [92 %-100 %] 99 %    Physical Exam  Vitals and nursing note reviewed.   Constitutional:       Appearance: She is  ill-appearing and toxic-appearing.   HENT:      Head:      Comments: hematoma     Nose:      Comments: cortrak nares     Mouth/Throat:      Mouth: Mucous membranes are dry.      Comments: Very swollen and bruised tongue with halitosis   Eyes:      General: No scleral icterus.     Conjunctiva/sclera: Conjunctivae normal.   Cardiovascular:      Rate and Rhythm: Normal rate and regular rhythm.   Pulmonary:      Effort: Pulmonary effort is normal.      Breath sounds: Normal breath sounds.      Comments: 2 liters of oxygen nasal cannula  Abdominal:      General: There is no distension.      Tenderness: There is no abdominal tenderness.   Musculoskeletal:      Cervical back: Normal range of motion and neck supple.      Right lower leg: No edema.      Left lower leg: No edema.   Skin:     General: Skin is warm and dry.   Neurological:      General: No focal deficit present.   Psychiatric:      Comments: Somnolent  She follows commands  Speech is unintelligible          Fluids    Intake/Output Summary (Last 24 hours) at 6/14/2022 0803  Last data filed at 6/14/2022 0600  Gross per 24 hour   Intake 2499.34 ml   Output 1365 ml   Net 1134.34 ml       Laboratory  Recent Labs     06/13/22  1354 06/14/22  0251   WBC 8.6 7.4   RBC 3.10* 2.71*   HEMOGLOBIN 11.0* 9.7*   HEMATOCRIT 32.8* 29.1*   .8* 107.4*   MCH 35.5* 35.8*   MCHC 33.5* 33.3*   RDW 51.3* 52.5*   PLATELETCT 132* 85*   MPV 9.7 9.6     Recent Labs     06/13/22  1354 06/13/22  1742 06/13/22  2153 06/14/22  0251   SODIUM 138 137 139 137   POTASSIUM 3.5*  --   --  4.4   CHLORIDE 100  --   --  107   CO2 17*  --   --  20   GLUCOSE 132*  --   --  81   BUN 10  --   --  9   CREATININE 0.63  --   --  0.37*   CALCIUM 9.1  --   --  7.4*     Recent Labs     06/14/22  0251   INR 1.09         Recent Labs     06/13/22  1742   TRIGLYCERIDE 63   HDL 66   LDL 89       Imaging  CT-CSPINE WITHOUT PLUS RECONS   Final Result      1.  No acute fracture is identified.      2.  Right  frontal, ethmoid, and maxillary sinus disease.      3.  1.2 cm hypodense right thyroid nodule      CT-HEAD W/O   Final Result      1. Right parietal subarachnoid hemorrhage in two cortical sulci.   2. Right parietal scalp hematoma.   3. No calvarial fracture.   4. Chronic right-sided paranasal sinus disease.      Based solely on CT findings, the brain injury guideline category is mBIG 1.      SDH < 4mm   IPH < 4mm   SAH < 3 sulci and < 1mm      The original BIG retrospective analysis found radiographic progression in 0% of BIG 1 patients and 2.6% BIG 2.      I, Dr. Golden Weiss, discussed the results of this examination directly by phone with Dr. Evelin Oconnor on 6/13/2022 at 1637 hours.      DX-CHEST-PORTABLE (1 VIEW)   Final Result      No acute cardiac or pulmonary abnormalities are identified.      EC-ECHOCARDIOGRAM COMPLETE W/O CONT    (Results Pending)        Assessment/Plan  * SAH (subarachnoid hemorrhage) (HCC)- (present on admission)  Assessment & Plan  Likely had a fall with seizure  Notable right parietal SAH, right parietal scalp hematoma  Trauma and neurology consulted  Repeat CT head with improvement on 6/14  SBP goal <140  Q4 mariyacheeileen  Follow serial sodium levels  Non-operative management     Alcohol withdrawal (HCC)- (present on admission)  Assessment & Plan  Severe requiring an IV Precedex drip  Detox bag, MV  She is at risk of refeeding syndrome thus follow phosphorus, magnesium, potassium  Thiamine replacement        Tongue laceration- (present on admission)  Assessment & Plan  Severe trauma to her tongue  Dr. Brennan plastic surgery has been consulted  She does not have airway compromise.    Seizure (HCC)- (present on admission)  Assessment & Plan  Likely secondary to ETOH withdrawal  She has a history of childhood seizures  Neurology consulted  Keppra 500mg IV BID then change to cortrak  Seizure precautions  PRN ativan  DMV will be contacted if she has a 's license it will need to be  revoked    Thrombocytopenia (HCC)- (present on admission)  Assessment & Plan  Platelets have gone down to 85 and will be checked in the morning  Likely bone marrow suppression from alcohol    Anemia- (present on admission)  Assessment & Plan  macrocytic    Agitation  Assessment & Plan  Scheduled ativan via cortrak  Precedex gtt -- wean off as tolerated    Acute encephalopathy  Assessment & Plan  Metabolic encephalopathy with likely alcohol withdrawal     Hyperglycemia  Assessment & Plan  F/u HbA1c  ISS    Hypokalemia  Assessment & Plan  Replacement given    Elevated LFTs  Assessment & Plan  Likely secondary to EtOH abuse    Traumatic rhabdomyolysis (HCC)  Assessment & Plan  Fall, seizure   thus IV fluids have been given         VTE prophylaxis: SCDs/TEDs    I have performed a physical exam and reviewed and updated ROS and Plan today (6/14/2022). In review of yesterday's note (6/13/2022), there are no changes except as documented above.    Ms. Ortez is critically ill. 34 minutes of critical care time were spent with patient, pharmacy, and nursing and in specific management of severe alcohol detox requiring titration of an IV Precedex drip.

## 2022-06-14 NOTE — ASSESSMENT & PLAN NOTE
ERP discussed with plastics. No indication for suture repair.  Oral hygiene.  6/14 Add Peridex Q4hrs with oral care.

## 2022-06-14 NOTE — ASSESSMENT & PLAN NOTE
History of seizures treated with antiepileptics as a child.  No recent seizure or medications.  Keppra initiated upon admission.  Mike Martinez M.D. Neurology.

## 2022-06-14 NOTE — ASSESSMENT & PLAN NOTE
Likely had a fall with seizure  Notable right parietal SAH, right parietal scalp hematoma  Trauma and neurology consulted  Repeat CT head with improvement on 6/14  SBP goal <140  Serial sodium levels have been normal  Non-operative management   Non-aneurysmal thus nimodipine is not indicated.  PT/OT and out of bed as tolerated.

## 2022-06-14 NOTE — DISCHARGE PLANNING
Care Transition Team Discharge Planning    Anticipated Discharge Disposition: d/c to Rehab    Action: Lsw attended rounds, and pt bit her tongue during pre admit seizure. It is swelling, and MD indicates place cortrak today. Pt has ETOH, and mumbles answers so can be difficult to understand.      Barriers to Discharge: medical clearance    Plan: Lsw will continue to follow, and assist w/ d/c planning.

## 2022-06-14 NOTE — ASSESSMENT & PLAN NOTE
Currently when I saw she is not hypertensive or tachycardic with no tremor post ativan therapy  Continue to monitor with aggressive treatment dex gtt and scheduled IV ativan as well as PRN  Vitamins, alcohol cessation strictly recommended  Optimize electrolytes, mag, phos, k

## 2022-06-14 NOTE — H&P
Hospital Medicine History & Physical Note    Date of Service  6/13/2022    Primary Care Physician  No primary care provider on file.    Consultants  ICU - to IMCU  Neurology    Code Status  Full Code    Chief Complaint  Chief Complaint   Patient presents with   • Seizure       History of Presenting Illness  Franchesca Ortez is a 36 y.o. female with history of mood disorder, alcohol dependence, seizure disorder, fibromyalgia, prior suicide attempt who presented 6/13/2022 with evaluation for seizure.  History limited as patient appears somnolent after receiving Ativan and also likely in postictal confusion state.  Per ER staff, patient had another seizure episode in the ER which was resolved with 2 mg IV Ativan administration.  CT head noted right parietal SAH, right parietal scalp hematoma, no calvarial fracture.  Trauma service, neurology services were consulted.  ICU was also consulted --recommended IMCU admission.  Admitted to medicine service.    I discussed the plan of care with patient, bedside RN and neurology.    Review of Systems  Review of Systems   Unable to perform ROS: Acuity of condition   HENT:        Tongue laceration, swelling   Musculoskeletal: Positive for falls.   Neurological: Positive for speech change, seizures and weakness. Negative for dizziness and headaches.   All other systems reviewed and are negative.  Limited ROS due to patient's clinical status at time of evaluation    Past Medical History   has a past medical history of Alcohol abuse, Depression, and History of suicide attempt.    Surgical History   has a past surgical history that includes gyn surgery and primary c section.     Family History  family history includes Cancer in her maternal grandmother; Diabetes in her father; Hypertension in her father.   Family history reviewed with patient. There is no family history that is pertinent to the chief complaint.     Social History   reports that she has been smoking cigarettes. She  has a 7.50 pack-year smoking history. She has never used smokeless tobacco. She reports that she does not drink alcohol and does not use drugs.    Allergies  No Known Allergies    Medications  None       Physical Exam  Temp:  [36.6 °C (97.8 °F)-36.8 °C (98.3 °F)] 36.8 °C (98.3 °F)  Pulse:  [] 121  Resp:  [15-58] 33  BP: (116-162)/(69-91) 130/89  SpO2:  [92 %-98 %] 93 %  Blood Pressure: 133/72   Temperature: 36.6 °C (97.8 °F)   Pulse: (!) 136   Respiration: 20   Pulse Oximetry: 97 %       Physical Exam  Vitals reviewed.   Constitutional:       Comments: Appears somnolent   HENT:      Head:      Comments: Scalp hematoma     Nose: Nose normal.      Mouth/Throat:      Comments: Swelling of the tongue, laceration  Bloody output to canister  Eyes:      General: No scleral icterus.  Cardiovascular:      Rate and Rhythm: Normal rate and regular rhythm.      Pulses: Normal pulses.      Heart sounds:     No friction rub.   Pulmonary:      Effort: Pulmonary effort is normal. No respiratory distress.      Breath sounds: No stridor. No wheezing or rales.   Abdominal:      General: Bowel sounds are normal. There is no distension.      Palpations: Abdomen is soft.      Tenderness: There is no abdominal tenderness. There is no guarding or rebound.   Musculoskeletal:         General: No tenderness or signs of injury. Normal range of motion.      Cervical back: Neck supple.   Skin:     General: Skin is warm and dry.      Capillary Refill: Capillary refill takes less than 2 seconds.   Neurological:      Mental Status: She is alert.      Motor: Weakness (No focal) present.      Comments: Able to follow commands appropriately  Minimally verbal   Psychiatric:      Comments: Unable to evaluate         Laboratory:  Recent Labs     06/13/22  1354   WBC 8.6   RBC 3.10*   HEMOGLOBIN 11.0*   HEMATOCRIT 32.8*   .8*   MCH 35.5*   MCHC 33.5*   RDW 51.3*   PLATELETCT 132*   MPV 9.7     Recent Labs     06/13/22  1354 06/13/22  0612  06/13/22  2153   SODIUM 138 137 139   POTASSIUM 3.5*  --   --    CHLORIDE 100  --   --    CO2 17*  --   --    GLUCOSE 132*  --   --    BUN 10  --   --    CREATININE 0.63  --   --    CALCIUM 9.1  --   --      Recent Labs     06/13/22  1354   ALTSGPT 63*   ASTSGOT 158*   ALKPHOSPHAT 94   TBILIRUBIN 0.8   GLUCOSE 132*         No results for input(s): NTPROBNP in the last 72 hours.  Recent Labs     06/13/22  1742   TRIGLYCERIDE 63   HDL 66   LDL 89     No results for input(s): TROPONINT in the last 72 hours.    Imaging:  CT-CSPINE WITHOUT PLUS RECONS   Final Result      1.  No acute fracture is identified.      2.  Right frontal, ethmoid, and maxillary sinus disease.      3.  1.2 cm hypodense right thyroid nodule      CT-HEAD W/O   Final Result      1. Right parietal subarachnoid hemorrhage in two cortical sulci.   2. Right parietal scalp hematoma.   3. No calvarial fracture.   4. Chronic right-sided paranasal sinus disease.      Based solely on CT findings, the brain injury guideline category is mBIG 1.      SDH < 4mm   IPH < 4mm   SAH < 3 sulci and < 1mm      The original BIG retrospective analysis found radiographic progression in 0% of BIG 1 patients and 2.6% BIG 2.      I, Dr. Golden Weiss, discussed the results of this examination directly by phone with Dr. Evelin Oconnor on 6/13/2022 at 1637 hours.      DX-CHEST-PORTABLE (1 VIEW)   Final Result      No acute cardiac or pulmonary abnormalities are identified.      EC-ECHOCARDIOGRAM COMPLETE W/O CONT    (Results Pending)       X-Ray:  I have personally reviewed the images and compared with prior images.    Assessment/Plan:  Justification for Admission Status  I anticipate this patient is inpatient status    * SAH (subarachnoid hemorrhage) (HCC)  Assessment & Plan  Likely had a fall with seizure  Notable right parietal SAH, right parietal scalp hematoma  Trauma and neurology consulted    SBP goal <140  Q4 damion    Alcohol withdrawal (HCC)  Assessment &  Plan  ETOH cessation advised  Detox bag, MV  Schedule Valium  CIWA    Acute encephalopathy  Assessment & Plan  Likely postictal    Agitation  Assessment & Plan  Valium, PRN ativan  Precedex gtt -- wean off as tolerated    Traumatic rhabdomyolysis (HCC)  Assessment & Plan  Fall, seizure    Mild  IVF    Seizure (HCC)- (present on admission)  Assessment & Plan  Likely secondary to ETOH withdrawal  Neurology consulted  Keppra 500mg IV BID  CIWA  Seizure precautions  PRN ativan    Hyperglycemia  Assessment & Plan  F/u HbA1c  ISS    Hypokalemia  Assessment & Plan  Replace    Elevated LFTs  Assessment & Plan  Likely secondary to EtOH abuse      VTE prophylaxis: SCDs/TEDs

## 2022-06-14 NOTE — ASSESSMENT & PLAN NOTE
Right parietal subarachnoid hemorrhage in two cortical sulci.  BIG 1.  Repeat CT stable to improved.  No ASA, NSAIDS, or anticoagulants for 2 weeks.  Neurosurgery consultation not indicated.

## 2022-06-14 NOTE — PROGRESS NOTES
Cortrak Placement    Tube Team verified patient name and medical record number prior to tube placement.  Cortrak tube (55 inches, 10 Venezuelan) placed at 70 cm in right nare.  Per Cortrak picture, tube appears to be in the stomach.  Nursing Instructions: Awaiting KUB to confirm placement before use for medications or feeding. Once placement confirmed, flush tube with 30 ml of water, and then remove and save stylet, in patient medication drawer.

## 2022-06-14 NOTE — PROGRESS NOTES
Neurology Progress Note  [facility]    Referring Physician: Daniel Hsu M.D.    Chief Complaint   Patient presents with   • Seizure       HPI: Refer to initial documented Neurology H&P, as detailed in the patient's chart.    Interval History 6/14/2022: No acute events overnight including no further seizure activity.  Patient is currently being treated for acute alcohol withdrawal via CIWA protocol and Precedex infusion.  She reports drinking alcohol daily with last drink 2 to 3 days ago. Currently sitting up in bed, sedated, awakens to repeated verbal and physical stimuli, oriented to self, place, time, but not situation, and following commands.  Denies headache, vision changes, facial or focal weakness, numbness, tingling, or language difficulty.    Past Medical History:   Past Medical History:   Diagnosis Date   • Alcohol abuse    • Depression    • History of suicide attempt         FHx:  Family History   Problem Relation Age of Onset   • Hypertension Father    • Diabetes Father    • Cancer Maternal Grandmother         liver        SHx:  Social History     Socioeconomic History   • Marital status:      Spouse name: Not on file   • Number of children: Not on file   • Years of education: Not on file   • Highest education level: Not on file   Occupational History   • Not on file   Tobacco Use   • Smoking status: Current Every Day Smoker     Packs/day: 0.50     Years: 15.00     Pack years: 7.50     Types: Cigarettes   • Smokeless tobacco: Never Used   • Tobacco comment: 3/4 pk a day   Substance and Sexual Activity   • Alcohol use: No   • Drug use: No   • Sexual activity: Yes     Partners: Male     Birth control/protection: I.U.D.   Other Topics Concern   • Not on file   Social History Narrative   • Not on file     Social Determinants of Health     Financial Resource Strain: Not on file   Food Insecurity: Not on file   Transportation Needs: Not on file   Physical Activity: Not on file   Stress:  Not on file   Social Connections: Not on file   Intimate Partner Violence: Not on file   Housing Stability: Not on file        Medications:    Current Facility-Administered Medications:   •  thiamine (B-1) IVPB 100 mg in 100 mL D5W (premix), 100 mg, Intravenous, DAILY, Danis Colin M.D., Stopped at 06/14/22 0618  •  folic acid 1 mg in NS 50 mL IVPB, 1 mg, Intravenous, Q24HRS, Danis Colin M.D., Last Rate: 100 mL/hr at 06/14/22 0949, 1 mg at 06/14/22 0949  •  LORazepam (ATIVAN) tablet 2 mg, 2 mg, Enteral Tube, Q6HRS, Daniel Hsu M.D., 2 mg at 06/14/22 1207  •  Pharmacy Consult: Enteral tube insertion - review meds/change route/product selection, 1 Each, Other, PHARMACY TO DOSE, Daniel Hsu M.D.  •  senna-docusate (PERICOLACE or SENOKOT S) 8.6-50 MG per tablet 2 Tablet, 2 Tablet, Enteral Tube, BID **AND** polyethylene glycol/lytes (MIRALAX) PACKET 1 Packet, 1 Packet, Enteral Tube, QDAY PRN **AND** magnesium hydroxide (MILK OF MAGNESIA) suspension 30 mL, 30 mL, Enteral Tube, QDAY PRN **AND** bisacodyl (DULCOLAX) suppository 10 mg, 10 mg, Rectal, QDAY PRN, Daniel Hsu M.D.  •  acetaminophen (Tylenol) tablet 650 mg, 650 mg, Enteral Tube, Q4HRS PRN **OR** acetaminophen (TYLENOL) suppository 650 mg, 650 mg, Rectal, Q4HRS PRN, Daniel Hsu M.D.  •  ondansetron (ZOFRAN ODT) dispertab 4 mg, 4 mg, Enteral Tube, Q4HRS PRN **OR** ondansetron (ZOFRAN) syringe/vial injection 4 mg, 4 mg, Intravenous, Q4HRS PRN, Daniel Hsu M.D.  •  promethazine (PHENERGAN) tablet 12.5-25 mg, 12.5-25 mg, Enteral Tube, Q4HRS PRN, Daniel Hsu M.D.  •  [COMPLETED] detox IV 1000 mL (D5LR + magnesium 1 g + thiamine 100 mg + folic acid 1 mg) infusion, , Intravenous, Once, Stopped at 06/14/22 0013 **AND** [DISCONTINUED] thiamine (Vitamin B-1) tablet 100 mg, 100 mg, Oral, DAILY **AND** [START ON 6/15/2022] multivitamin (THERAGRAN) tablet 1 Tablet, 1 Tablet, Enteral Tube, DAILY **AND** [DISCONTINUED] folic acid (FOLVITE) tablet 1 mg, 1 mg,  Oral, DAILY, Danis Colin M.D.  •  LORazepam (ATIVAN) tablet 4 mg, 4 mg, Enteral Tube, Q15 MIN PRN **OR** LORazepam (ATIVAN) injection 2 mg, 2 mg, Intravenous, Q15 MIN PRN, Daniel Hsu M.D.  •  LORazepam (ATIVAN) tablet 3 mg, 3 mg, Enteral Tube, Q HOUR PRN **OR** LORazepam (ATIVAN) injection 1.5 mg, 1.5 mg, Intravenous, Q HOUR PRN, Daniel Hsu M.D.  •  LORazepam (ATIVAN) tablet 2 mg, 2 mg, Enteral Tube, Q2HRS PRN **OR** LORazepam (ATIVAN) injection 1 mg, 1 mg, Intravenous, Q2HRS PRN, Daniel Hsu M.D.  •  LORazepam (ATIVAN) tablet 1 mg, 1 mg, Enteral Tube, Q4HRS PRN **OR** LORazepam (ATIVAN) injection 0.5 mg, 0.5 mg, Intravenous, Q4HRS PRN, Daniel Hsu M.D.  •  LORazepam (ATIVAN) tablet 0.5 mg, 0.5 mg, Enteral Tube, Q4HRS PRN, Daniel Hsu M.D.  •  guaiFENesin dextromethorphan (ROBITUSSIN DM) 100-10 MG/5ML syrup 10 mL, 10 mL, Enteral Tube, Q6HRS PRN, Daniel Hsu M.D.  •  chlorhexidine (PERIDEX) 0.12 % solution 15 mL, 15 mL, Mouth/Throat, Q4HRS, Roro Allen, A.P.RObedN.  •  promethazine (PHENERGAN) suppository 12.5-25 mg, 12.5-25 mg, Rectal, Q4HRS PRN, Danis Colin M.D.  •  prochlorperazine (COMPAZINE) injection 5-10 mg, 5-10 mg, Intravenous, Q4HRS PRN, Danis Colin M.D.  •  Respiratory Therapy Consult, , Nebulization, Continuous RT, Danis Colin M.D.  •  MD Alert...ICU Electrolyte Replacement per Pharmacy, , Other, PHARMACY TO DOSE, Danis Colin M.D.  •  LORazepam (ATIVAN) injection 2 mg, 2 mg, Intravenous, Q5 MIN PRN, Danis Colin M.D.  •  enalaprilat (Vasotec) injection 1.25 mg 1 mL, 1.25 mg, Intravenous, Q6HRS PRN, Danis Colin M.D.  •  hydrALAZINE (APRESOLINE) injection 10 mg, 10 mg, Intravenous, Q4HRS PRN, Danis Colin M.D.  •  ipratropium-albuterol (DUONEB) nebulizer solution, 3 mL, Nebulization, Q4H PRN (RT), Danis Colin M.D.  •  levETIRAcetam (Keppra) injection 500 mg, 500 mg, Intravenous, Q12HRS, Danis Colin M.D., 500 mg at 06/14/22 0600  •  dexmedetomidine (Precedex) 400 mcg/100mL  D5W premix infusion, 0.1-1.5 mcg/kg/hr, Intravenous, Continuous, Danis Colin M.D., Last Rate: 5.3 mL/hr at 06/14/22 0600, 0.3 mcg/kg/hr at 06/14/22 0600    Allergies:  No Known Allergies     Review of systems: In addition to what is detailed in the interval history above, all other systems reviewed and are negative.      Physical Examination:   Vitals:    06/14/22 0500 06/14/22 0600 06/14/22 0700 06/14/22 0800   BP: 114/77 104/72  108/77   Pulse: 96 (!) 101  91   Resp: (!) 22 (!) 28  (!) 29   Temp:    36.4 °C (97.5 °F)   TempSrc:    Temporal   SpO2: 99% 99% 98% 98%   Weight:       Height:         General: Patient in no acute distress.  HEENT: Normocephalic, unable to visualize right parietal hematoma due to matted hair obstructing the scalp.   Neck: Supple, no meningeal signs or carotid bruits. There is normal range of motion. No tenderness on exam.   Chest: Regular and unlabored breaths on 2L NC. No cough.   CV: RRR, normotensive.   Skin: No signs of acute rashes or trauma.   Musculoskeletal: Joints exhibit full range of motion, without any pain to palpation. There are no signs of joint or muscle swelling. There is no tenderness to deep palpation of muscles.   Psychiatric: No hallucinatory behavior. Mood and affect appear sedated and withdrawn on exam.      NEUROLOGICAL EXAM:   Mental status, orientation: Somnolent/sedated, opens eyes to repeated verbal stimuli, following commands, and oriented to self, place, and time, but not situation.  Speech and language: Speech is limited, moderately dysarthric and fluent.  Patient is able to name, repeat, and comprehend.   Cranial nerve exam: Pupils are 4 mm bilaterally and equally reactive to light. Visual fields are intact by confrontation.  There is no nystagmus on primary or secondary gaze. VOR intact. Face appears symmetric. Tongue is midline and with large ventral laceration and bruising. Sternocleidomastoid muscles exhibit is normal strength bilaterally. Shoulder  shrug is intact bilaterally.   Motor exam: Strength is antigravity in all extremities without drift noted, 4/5 with effort dependence. Tone is normal. No abnormal movements were seen on exam.   Sensory exam  Normal sensation to light touch and pinprick in all extremities.  Deep tendon reflexes: Plantar responses are flexor. There is no clonus.   Coordination: No fatuma ataxia with observed movements.   Gait: Deferred as patient is unable at this time.       Ancillary Data Reviewed:    Labs:  Lab Results   Component Value Date/Time    PROTHROMBTM 13.8 06/14/2022 02:51 AM    INR 1.09 06/14/2022 02:51 AM      Lab Results   Component Value Date/Time    WBC 7.4 06/14/2022 02:51 AM    RBC 2.71 (L) 06/14/2022 02:51 AM    HEMOGLOBIN 9.7 (L) 06/14/2022 02:51 AM    HEMATOCRIT 29.1 (L) 06/14/2022 02:51 AM    .4 (H) 06/14/2022 02:51 AM    MCH 35.8 (H) 06/14/2022 02:51 AM    MCHC 33.3 (L) 06/14/2022 02:51 AM    MPV 9.6 06/14/2022 02:51 AM    NEUTSPOLYS 77.80 (H) 06/14/2022 02:51 AM    LYMPHOCYTES 12.90 (L) 06/14/2022 02:51 AM    MONOCYTES 7.00 06/14/2022 02:51 AM    EOSINOPHILS 0.80 06/14/2022 02:51 AM    BASOPHILS 0.80 06/14/2022 02:51 AM    ANISOCYTOSIS 1+ 06/13/2022 01:54 PM      Lab Results   Component Value Date/Time    SODIUM 134 (L) 06/14/2022 12:04 PM    POTASSIUM 4.4 06/14/2022 02:51 AM    CHLORIDE 107 06/14/2022 02:51 AM    CO2 20 06/14/2022 02:51 AM    GLUCOSE 81 06/14/2022 02:51 AM    BUN 9 06/14/2022 02:51 AM    CREATININE 0.37 (L) 06/14/2022 02:51 AM      Lab Results   Component Value Date/Time    CHOLSTRLTOT 168 06/13/2022 05:42 PM    LDL 89 06/13/2022 05:42 PM    HDL 66 06/13/2022 05:42 PM    TRIGLYCERIDE 63 06/13/2022 05:42 PM       Lab Results   Component Value Date/Time    ALKPHOSPHAT 68 06/14/2022 02:51 AM    ASTSGOT 97 (H) 06/14/2022 02:51 AM    ALTSGPT 43 06/14/2022 02:51 AM    TBILIRUBIN 0.6 06/14/2022 02:51 AM        Imaging/Testing:    I interpreted and/or reviewed the patient's  neuroimaging    DX-ABDOMEN FOR TUBE PLACEMENT   Final Result      The tip of the feeding tube terminates over the antrum of the stomach.      CT-CSPINE WITHOUT PLUS RECONS   Final Result      1.  No acute fracture is identified.      2.  Right frontal, ethmoid, and maxillary sinus disease.      3.  1.2 cm hypodense right thyroid nodule      CT-HEAD W/O   Final Result      1. Right parietal subarachnoid hemorrhage in two cortical sulci.   2. Right parietal scalp hematoma.   3. No calvarial fracture.   4. Chronic right-sided paranasal sinus disease.      Based solely on CT findings, the brain injury guideline category is mBIG 1.      SDH < 4mm   IPH < 4mm   SAH < 3 sulci and < 1mm      The original BIG retrospective analysis found radiographic progression in 0% of BIG 1 patients and 2.6% BIG 2.      I, Dr. Golden Weiss, discussed the results of this examination directly by phone with Dr. Eevlin Oconnor on 6/13/2022 at 1637 hours.      DX-CHEST-PORTABLE (1 VIEW)   Final Result      No acute cardiac or pulmonary abnormalities are identified.      EC-ECHOCARDIOGRAM COMPLETE W/O CONT    (Results Pending)   CT-HEAD W/O    (Results Pending)       Assessment and Plan:    Franchesca Ortez is a 36 y.o. female with relevant history of alcohol abuse, depression, remote history of seizures with last seizure at age 11, and prior suicide attempt  who presented on 6/13/2022 after having a witnessed seizure and for which neurology was consulted for seizure management. The witnessed seizures are most likely associated with acute alcohol withdrawal given her history of alcohol abuse, diagnostic alcohol <10.1, last drink 2 to 3 days ago, and reported agitation and shakiness improved with Ativan PRN. The right parietal SAH with associated right parietal scalp hematoma is consistent with traumatic etiology; however, given the increased risk of seizure with SAH will continue her on prophylactic AED as well with maintenance Keppra  500mg q12hr for 2 weeks.      Plan:     - Q4hr and PRN neurological assessments and vital sign assessments  - BP goal <140/90; control with nicardipine gtt if needed.  Antihypertensives per primary team.  - No significant mass effect on CTH, maintain eunatremia Na goal 135-145  - CIWA protocol per primary team  - Keppra 500mg IVPB q12hr x2 weeks. May transition to PO 1:1 once patient awakens and is able to tolerate oral intake  - Hold all antiplatelets and anticoagulants  - HOB at 30 degrees  - Seizure and fall precautions  - Report to DMV as patient is not to drive for 6 months following seizure per [place] state law  - No need for outpatient follow up in Epilepsy Clinic as seizures are due to alcohol withdrawal primarily  - DVT ppx: SCDs    No further recommendations or further studies from a neurological standpoint at this time. Please re-consult if you have further questions or there is a change in status.    The evaluation of the patient, and recommended management, was discussed with Dr. Martinez and bedside RN. I have performed a physical exam and reviewed and updated ROS and Plan today (6/14/2022). In review of yesterday's note (6/13/2022), there are no changes except as documented above.    Mike Mendoza, KARLA [facility]-BC  Nurse Practitioner  [facility]  (t) [phone]

## 2022-06-14 NOTE — PROGRESS NOTES
MD Hsu notified of patient's edematous tongue preventing the patient from articulating words or swallowing. He was also notified of patient's drop in hemoglobin from 11.0 to 9.7.

## 2022-06-14 NOTE — ASSESSMENT & PLAN NOTE
Admission AA 97.5%.  Repeat CT stable to improved.  No indication for platelet transfusion at this time.

## 2022-06-14 NOTE — THERAPY
Missed Therapy     Patient Name: Franchesca Ortez  Age:  36 y.o., Sex:  female  Medical Record #: [id number]  Today's Date: 6/14/2022    Discussed missed therapy with nursing        06/14/22 0898   Interdisciplinary Plan of Care Collaboration   IDT Collaboration with  Nursing   Collaboration Comments Clinical swallow evaluation received and acknowledged. The patient is waxing and waning between lethargy and restlessness. Not appropriate for PO intake. Will complete CSE as medically appropriate.

## 2022-06-14 NOTE — DISCHARGE PLANNING
[facility] Transitional Care Coordination    Referral from: Dr. Colin    Insurance Provider on Facesheet: [facility]    Potential Rehab Diagnosis: TBI/Other Neuro    Chart review indicates patient may have on going medical management and may have therapy needs to possibly meet inpatient rehab facility criteria with the goal of returning to community.    D/C support will need to be verified: Spouse    Physiatry consultation pended per protocol.  W/U & TX pending.      Thank you for the referral.

## 2022-06-14 NOTE — ASSESSMENT & PLAN NOTE
Severe trauma to her tongue  Dr. Brennan plastic surgery has been consulted  She does not have airway compromise.  q4 hour chlorhexidine cleaning.    Full liquid diet

## 2022-06-14 NOTE — DIETARY
"Nutrition Support Assessment   Day 1 of admit.  Franchesca Ortez is a 36 y.o. female with admitting DX of Seizure.     Current problem list:  SAH   Seizure - PMHx of, last reported seizure @ age 11  Alcohol withdrawal  Tongue laceration   Elevated LFTs  Hypokalemia  Hyperglycemia  Acute encephalopathy   Traumatic rhabdomyolysis  Agitation     Assessment:  Estimated Nutritional Needs: based on:   Height: 165.1 cm (5' 5\")  Weight: 65.3 kg (143 lb 15.4 oz) - via bed scale  Body mass index is 23.96 kg/m²., BMI classification: Normal    Calculation/Equation: MSJ x 1.2 = 1620 kcals  Total Calories / day: 1633 - 1959 (Calories / k - 30)  Total Grams Protein / day: 65 - 85 (Grams Protein / k - 1.3)    Evaluation:   1. SLP consulted for SLP, noted \"not appropriate for PO intake\", waxing/waning between lethargy and restlessness. Pt w/ edematous tongue limiting pt's ability to swallow or verbalize words per RN note. Current clear liquids diet order in place; per flowsheets, pt is NPO.  2. Gastric cortrak placed; per [facility] imaging report, \"tip projects over the antrum of the stomach.\"  3. Labs: crea 0.37, Ca++ 7.4, phos 2.0, glu 81 (WNL)  4. MAR: dex, folic acid in NS IV, thiamine IV (stopped), ativan, MVI, ICU electrolyte replacement protocol. Completed: NS bolus (), detox IV (), mag sulfate in D5W IV  5. +2L NC  6. Last BM: none documented this admit  7. At risk of refeeding syndrome w/ noted severe wt loss per chart hx and low electrolytes requiring replacement.  8. Standard fiber-containing formula can meet pt's estimated nutrition needs at this time.     Malnutrition Risk: At risk w/ pmhx of EtOH dependence per H&P. Limited wt hx per chart review shows wt loss of 21 lbs in 4 months from 165 lbs 2/10/22: severe 12.7% loss.     Recommendations/Plan:  1. Modified TF advancement plan d/t risk of refeeding; communicated plan to RN via Voalte message. Initiate Fibersource HN at 20 ml/hr and advance by 20 ml " q24hrs to goal rate 60 ml/hr, daily providing 1728 kcals, 78 grams protein, 1166 mL free water.  2. Fluids per MD.  3. PO diet as medically feasible to be per MD/SLP.  4. Monitor for refeeding: RD to order BMP w/ Mg and Phos x 7 days per tube feeding protocol. Replete K, Phos and Mg prn. Continue to supplement 100 mg Thiamine x 7 days to reduce risk of refeeding.  5. Requested phos replacement from MD.  6. Monitor wt trends.  7. RD to follow up for PO hx interview and NFPE when pt appropriate.    RD following.

## 2022-06-15 ENCOUNTER — APPOINTMENT (OUTPATIENT)
Dept: RADIOLOGY | Facility: MEDICAL CENTER | Age: 37
DRG: 894 | End: 2022-06-15
Attending: STUDENT IN AN ORGANIZED HEALTH CARE EDUCATION/TRAINING PROGRAM

## 2022-06-15 PROBLEM — R73.9 HYPERGLYCEMIA: Status: RESOLVED | Noted: 2022-06-13 | Resolved: 2022-06-15

## 2022-06-15 LAB
CRP SERPL HS-MCNC: 10.38 MG/DL (ref 0–0.75)
GLUCOSE SERPL-MCNC: 98 MG/DL (ref 65–99)
LV EJECT FRACT  99904: 50
LV EJECT FRACT MOD 2C 99903: 67.27
LV EJECT FRACT MOD 4C 99902: 58.85
LV EJECT FRACT MOD BP 99901: 65.11
MAGNESIUM SERPL-MCNC: 1.6 MG/DL (ref 1.5–2.5)
PHOSPHATE SERPL-MCNC: 3.2 MG/DL (ref 2.5–4.5)
PREALB SERPL-MCNC: 21.6 MG/DL (ref 18–38)

## 2022-06-15 PROCEDURE — 86140 C-REACTIVE PROTEIN: CPT

## 2022-06-15 PROCEDURE — 700111 HCHG RX REV CODE 636 W/ 250 OVERRIDE (IP): Performed by: STUDENT IN AN ORGANIZED HEALTH CARE EDUCATION/TRAINING PROGRAM

## 2022-06-15 PROCEDURE — 700105 HCHG RX REV CODE 258: Performed by: STUDENT IN AN ORGANIZED HEALTH CARE EDUCATION/TRAINING PROGRAM

## 2022-06-15 PROCEDURE — 93306 TTE W/DOPPLER COMPLETE: CPT | Mod: 26 | Performed by: INTERNAL MEDICINE

## 2022-06-15 PROCEDURE — 770000 HCHG ROOM/CARE - INTERMEDIATE ICU *

## 2022-06-15 PROCEDURE — 700101 HCHG RX REV CODE 250: Performed by: STUDENT IN AN ORGANIZED HEALTH CARE EDUCATION/TRAINING PROGRAM

## 2022-06-15 PROCEDURE — A9270 NON-COVERED ITEM OR SERVICE: HCPCS | Performed by: NURSE PRACTITIONER

## 2022-06-15 PROCEDURE — 99254 IP/OBS CNSLTJ NEW/EST MOD 60: CPT | Performed by: PHYSICAL MEDICINE & REHABILITATION

## 2022-06-15 PROCEDURE — 84100 ASSAY OF PHOSPHORUS: CPT

## 2022-06-15 PROCEDURE — 700102 HCHG RX REV CODE 250 W/ 637 OVERRIDE(OP): Performed by: NURSE PRACTITIONER

## 2022-06-15 PROCEDURE — A9270 NON-COVERED ITEM OR SERVICE: HCPCS | Performed by: HOSPITALIST

## 2022-06-15 PROCEDURE — 700102 HCHG RX REV CODE 250 W/ 637 OVERRIDE(OP): Performed by: HOSPITALIST

## 2022-06-15 PROCEDURE — 700105 HCHG RX REV CODE 258: Performed by: HOSPITALIST

## 2022-06-15 PROCEDURE — 82947 ASSAY GLUCOSE BLOOD QUANT: CPT

## 2022-06-15 PROCEDURE — 700111 HCHG RX REV CODE 636 W/ 250 OVERRIDE (IP): Performed by: HOSPITALIST

## 2022-06-15 PROCEDURE — 99291 CRITICAL CARE FIRST HOUR: CPT | Performed by: HOSPITALIST

## 2022-06-15 PROCEDURE — 83735 ASSAY OF MAGNESIUM: CPT

## 2022-06-15 PROCEDURE — 84134 ASSAY OF PREALBUMIN: CPT

## 2022-06-15 RX ORDER — LORAZEPAM 2 MG/ML
2 INJECTION INTRAMUSCULAR ONCE
Status: COMPLETED | OUTPATIENT
Start: 2022-06-15 | End: 2022-06-15

## 2022-06-15 RX ORDER — MAGNESIUM SULFATE HEPTAHYDRATE 40 MG/ML
2 INJECTION, SOLUTION INTRAVENOUS ONCE
Status: COMPLETED | OUTPATIENT
Start: 2022-06-15 | End: 2022-06-15

## 2022-06-15 RX ORDER — LEVETIRACETAM 500 MG/1
500 TABLET ORAL 2 TIMES DAILY
Status: DISCONTINUED | OUTPATIENT
Start: 2022-06-15 | End: 2022-06-17 | Stop reason: HOSPADM

## 2022-06-15 RX ORDER — NICOTINE 21 MG/24HR
21 PATCH, TRANSDERMAL 24 HOURS TRANSDERMAL
Status: DISCONTINUED | OUTPATIENT
Start: 2022-06-15 | End: 2022-06-16

## 2022-06-15 RX ORDER — SODIUM CHLORIDE, SODIUM LACTATE, POTASSIUM CHLORIDE, CALCIUM CHLORIDE 600; 310; 30; 20 MG/100ML; MG/100ML; MG/100ML; MG/100ML
INJECTION, SOLUTION INTRAVENOUS CONTINUOUS
Status: DISCONTINUED | OUTPATIENT
Start: 2022-06-15 | End: 2022-06-16

## 2022-06-15 RX ADMIN — FOLIC ACID 1 MG: 5 INJECTION, SOLUTION INTRAMUSCULAR; INTRAVENOUS; SUBCUTANEOUS at 05:57

## 2022-06-15 RX ADMIN — LORAZEPAM 2 MG: 2 TABLET ORAL at 12:35

## 2022-06-15 RX ADMIN — THERA TABS 1 TABLET: TAB at 05:21

## 2022-06-15 RX ADMIN — CHLORHEXIDINE GLUCONATE 0.12% ORAL RINSE 15 ML: 1.2 LIQUID ORAL at 22:00

## 2022-06-15 RX ADMIN — SODIUM CHLORIDE, POTASSIUM CHLORIDE, SODIUM LACTATE AND CALCIUM CHLORIDE: 600; 310; 30; 20 INJECTION, SOLUTION INTRAVENOUS at 15:07

## 2022-06-15 RX ADMIN — CHLORHEXIDINE GLUCONATE 0.12% ORAL RINSE 15 ML: 1.2 LIQUID ORAL at 05:27

## 2022-06-15 RX ADMIN — DOCUSATE SODIUM 50 MG AND SENNOSIDES 8.6 MG 2 TABLET: 8.6; 5 TABLET, FILM COATED ORAL at 05:19

## 2022-06-15 RX ADMIN — NICOTINE TRANSDERMAL SYSTEM 21 MG: 21 PATCH, EXTENDED RELEASE TRANSDERMAL at 10:21

## 2022-06-15 RX ADMIN — MAGNESIUM SULFATE HEPTAHYDRATE 2 G: 40 INJECTION, SOLUTION INTRAVENOUS at 10:22

## 2022-06-15 RX ADMIN — CHLORHEXIDINE GLUCONATE 0.12% ORAL RINSE 15 ML: 1.2 LIQUID ORAL at 12:35

## 2022-06-15 RX ADMIN — LORAZEPAM 2 MG: 2 TABLET ORAL at 05:19

## 2022-06-15 RX ADMIN — LEVETIRACETAM 500 MG: 100 INJECTION, SOLUTION INTRAVENOUS at 05:24

## 2022-06-15 RX ADMIN — CHLORHEXIDINE GLUCONATE 0.12% ORAL RINSE 15 ML: 1.2 LIQUID ORAL at 10:21

## 2022-06-15 RX ADMIN — DEXMEDETOMIDINE 0.6 MCG/KG/HR: 200 INJECTION, SOLUTION INTRAVENOUS at 01:31

## 2022-06-15 RX ADMIN — THIAMINE HYDROCHLORIDE 100 MG: 100 INJECTION, SOLUTION INTRAMUSCULAR; INTRAVENOUS at 05:18

## 2022-06-15 RX ADMIN — LORAZEPAM 2 MG: 2 INJECTION INTRAMUSCULAR; INTRAVENOUS at 22:01

## 2022-06-15 RX ADMIN — CHLORHEXIDINE GLUCONATE 0.12% ORAL RINSE 15 ML: 1.2 LIQUID ORAL at 01:31

## 2022-06-15 ASSESSMENT — LIFESTYLE VARIABLES
ORIENTATION AND CLOUDING OF SENSORIUM: DATE DISORIENTATION BY MORE THAN TWO CALENDAR DAYS
ANXIETY: MILDLY ANXIOUS
AUDITORY DISTURBANCES: NOT PRESENT
AGITATION: SOMEWHAT MORE THAN NORMAL ACTIVITY
TREMOR: NO TREMOR
HEADACHE, FULLNESS IN HEAD: NOT PRESENT
NAUSEA AND VOMITING: NO NAUSEA AND NO VOMITING
TOTAL SCORE: 5
VISUAL DISTURBANCES: NOT PRESENT
HEADACHE, FULLNESS IN HEAD: NOT PRESENT
PAROXYSMAL SWEATS: NO SWEAT VISIBLE
TOTAL SCORE: 5
VISUAL DISTURBANCES: NOT PRESENT
PAROXYSMAL SWEATS: NO SWEAT VISIBLE
AGITATION: SOMEWHAT MORE THAN NORMAL ACTIVITY
AUDITORY DISTURBANCES: NOT PRESENT
NAUSEA AND VOMITING: NO NAUSEA AND NO VOMITING
ANXIETY: MILDLY ANXIOUS
ORIENTATION AND CLOUDING OF SENSORIUM: DATE DISORIENTATION BY MORE THAN TWO CALENDAR DAYS
TREMOR: NO TREMOR

## 2022-06-15 ASSESSMENT — FIBROSIS 4 INDEX
FIB4 SCORE: 6.09
FIB4 SCORE: 6.09

## 2022-06-15 NOTE — DISCHARGE PLANNING
Care Transition Team Discharge Planning    Anticipated Discharge Disposition: d/c to Rehab    Action: Lsw spoke to pt's mom, first emergency contact on face sheet. Lsw explained order for Rehab and advised there are two choices to pick from.    She chose [facility] as it is closer to their residence.    She questioned if pt's sister, Kelly, in [place] can be placed on list to receive medical updates. Lsw explained the hospital would rather have one family member accept updates and that person disseminate to other family members. The reasoning is that the ICU RNs are busy w/ very sick pts, and don't have a lot of time to spend on the phone repeating updates w/ the several family members for the same pt. Pt's mom indicates she understands.     Barriers to Discharge: medical clearance    Plan: Lsw will continue to follow, and assist w/ d/c planning.

## 2022-06-15 NOTE — DISCHARGE PLANNING
Following for post acute services need to verify return to community support. Will reach out to Mother again today.

## 2022-06-15 NOTE — CONSULTS
Physical Medicine and Rehabilitation Consultation              Date of initial consultation: 6/15/2022  Requesting provider: Danis Colin MD   Consulting provider: Evie Tapia D.O.  Reason for consultation: assess for acute inpatient rehab appropriateness  LOS: 2 Day(s)    Chief complaint: Witnessed seizure    HPI: The patient is a 36 y.o.  female with a past medical history of alcohol abuse, depression and history of a suicide attempt;  who presented on 6/13/2022  1:50 PM after witnessed seizure.  Per documentation, patient has a history of prior seizures her last seizure was at age 11.  Patient had a witnessed seizure at home, EMS was activated and patient was brought to the [facility] emergency department.  Postictally patient has no memory of the event upon arrival and had a large lung laceration.  Reportedly patient had a witnessed generalized tonic-clonic seizure lasting approximately 45 seconds with urinary incontinence, tongue biting and she fell backwards hitting her head.  IV Ativan and Keppra was given in the emergency department.  A CT head was obtained which revealed a small right parietal subarachnoid hemorrhage with adjacent right parietal scalp hematoma.  While in the emergency department patient becomes shaky, agitated and received additional Ativan 6 mg, initial evaluation for seizure onset is presumed to be alcohol withdrawal.  Neurology was consulted for seizure management, trauma was consulted for patient's TBI.  Patient was admitted to the ICU, she was placed on IV Precedex drip for alcohol withdrawal follow-up CT head showed mild improvement of her subarachnoid hemorrhage.  Patient is requiring soft restraints due to agitation.  And a core track has been placed due to her dysphagia which is secondary to her tongue laceration.  Patient's tongue is swollen and bruised, chlorhexidine scrubs are being applied every 4 hours to prevent infection.  Dr. Brennan of plastic surgery has been consulted  for patient's tongue lacerations.  Therapy eval's have not been completed yet.    Patient seen and examined at bedside with nursing. Patient has difficulty managing secretions due to excessive tongue swelling and mucus. Patient denies tongue or jaw pain. Denies numbness, tingling, CP, SOB, or headache. + frequent coughing.     Social Hx:  Patient lives in [place] with two roomates, reports they cannot help her after DC.   0 NAA  At prior level of function patient was independent with mobility and ADLs.       Tobacco: Denies  Alcohol: +alcohol use   Drugs: Denies     THERAPY:  Restrictions: Fall risk  PT: Functional mobility   PT eval pending    OT: ADLs  OT eval pending     SLP:  SLP eval pending, currently NPO on TF with cortrak     IMAGIN/14 CT head  IMPRESSION:     1. Improvement of the areas of right parietal subarachnoid hemorrhage, with subarachnoid blood located in the parasagittal right occipital subarachnoid space.  2. No new hemorrhage in the interval.  3. Chronic right-sided sinus disease.  4. Significant improvement in right parietal scalp hematoma.     echocardiogram  EF 50%     CT C-spine  IMPRESSION:     1.  No acute fracture is identified.     2.  Right frontal, ethmoid, and maxillary sinus disease.     3.  1.2 cm hypodense right thyroid nodule     CT head  IMPRESSION:     1. Right parietal subarachnoid hemorrhage in two cortical sulci.  2. Right parietal scalp hematoma.  3. No calvarial fracture.  4. Chronic right-sided paranasal sinus disease.     Based solely on CT findings, the brain injury guideline category is mBIG 1.    PROCEDURES:  None    PMH:  Past Medical History:   Diagnosis Date   • Alcohol abuse    • Depression    • History of suicide attempt        PSH:  Past Surgical History:   Procedure Laterality Date   • GYN SURGERY      c section   • PRIMARY C SECTION         FHX:  Family History   Problem Relation Age of Onset   • Hypertension Father    • Diabetes Father    • Cancer  "Maternal Grandmother         liver       Medications:  Current Facility-Administered Medications   Medication Dose   • levETIRAcetam (KEPPRA) tablet 500 mg  500 mg   • thiamine (B-1) IVPB 100 mg in 100 mL D5W (premix)  100 mg   • folic acid 1 mg in NS 50 mL IVPB  1 mg   • LORazepam (ATIVAN) tablet 2 mg  2 mg   • Pharmacy Consult: Enteral tube insertion - review meds/change route/product selection  1 Each   • senna-docusate (PERICOLACE or SENOKOT S) 8.6-50 MG per tablet 2 Tablet  2 Tablet    And   • polyethylene glycol/lytes (MIRALAX) PACKET 1 Packet  1 Packet    And   • magnesium hydroxide (MILK OF MAGNESIA) suspension 30 mL  30 mL    And   • bisacodyl (DULCOLAX) suppository 10 mg  10 mg   • acetaminophen (Tylenol) tablet 650 mg  650 mg    Or   • acetaminophen (TYLENOL) suppository 650 mg  650 mg   • ondansetron (ZOFRAN ODT) dispertab 4 mg  4 mg    Or   • ondansetron (ZOFRAN) syringe/vial injection 4 mg  4 mg   • promethazine (PHENERGAN) tablet 12.5-25 mg  12.5-25 mg   • multivitamin (THERAGRAN) tablet 1 Tablet  1 Tablet   • guaiFENesin dextromethorphan (ROBITUSSIN DM) 100-10 MG/5ML syrup 10 mL  10 mL   • chlorhexidine (PERIDEX) 0.12 % solution 15 mL  15 mL   • promethazine (PHENERGAN) suppository 12.5-25 mg  12.5-25 mg   • prochlorperazine (COMPAZINE) injection 5-10 mg  5-10 mg   • Respiratory Therapy Consult     • MD Alert...ICU Electrolyte Replacement per Pharmacy     • LORazepam (ATIVAN) injection 2 mg  2 mg   • enalaprilat (Vasotec) injection 1.25 mg 1 mL  1.25 mg   • hydrALAZINE (APRESOLINE) injection 10 mg  10 mg   • ipratropium-albuterol (DUONEB) nebulizer solution  3 mL   • dexmedetomidine (Precedex) 400 mcg/100mL D5W premix infusion  0.1-1.5 mcg/kg/hr       Allergies:  No Known Allergies  Physical Exam:  Vitals: /68   Pulse 80   Temp 36.7 °C (98.1 °F) (Temporal)   Resp (!) 26   Ht 1.651 m (5' 5\")   Wt 65.2 kg (143 lb 11.8 oz)   SpO2 91%   Gen: NAD, laying in bed,nursing at side   Head:  " NC/AT  Eyes/ Nose/ Mouth: PERRLA, excessive tongue swelling with darkened tissues, increased dark brown mucus , poor management of secretions   Cardio: RRR, good distal perfusion, warm extremities  Pulm: normal respiratory effort, no cyanosis, + frequent coughing for secretion management   Abd: Soft NTND, negative borborygmi   Ext: No peripheral edema. No calf tenderness. No clubbing.    Mental status:  A&Ox4 (person, place, date, situation) answers questions appropriately follows commands  Speech: fluent, +dysarthria due to impaired tongue and secretion management     CRANIAL NERVES:  2,3: visual acuity grossly intact, PERRL  3,4,6: EOMI bilaterally, no nystagmus or diplopia  5: sensation intact to light touch bilaterally and symmetric  7: no facial asymmetry, + mandibular swelling   8: hearing grossly intact          Motor:      Upper Extremity  Myotome R L   Shoulder flexion C5 5/5 5/5   Elbow flexion C5 5/5 5/5   Wrist extension C6 5/5 5/5   Elbow extension C7 5/5 5/5   Finger flexion C8 5/5 5/5   Finger abduction T1 5/5 5/5     Lower Extremity Myotome R L   Hip flexion L2 5/5 5/5   Knee extension L3 5/5 5/5   Ankle dorsiflexion L4 5/5 5/5   Toe extension L5 5/5 5/5   Ankle plantarflexion S1 5/5 5/5       Sensory:   intact to light touch through out b/l UE and LE     DTRs:, 2+ in bilateral patellar tendons  Negative Garduno b/l     Tone: no spasticity noted, no cogwheeling noted    Coordination:   intact finger to nose bilaterally, able to manage using oral suction independently   intact fine motor with fingers bilaterally      Labs: Reviewed and significant for   Recent Labs     06/13/22  1354 06/14/22  0251   RBC 3.10* 2.71*   HEMOGLOBIN 11.0* 9.7*   HEMATOCRIT 32.8* 29.1*   PLATELETCT 132* 85*   PROTHROMBTM  --  13.8   INR  --  1.09     Recent Labs     06/13/22  1354 06/13/22  1742 06/13/22  2153 06/14/22  0251 06/14/22  1204 06/15/22  0313   SODIUM 138   < > 139 137 134*  --    POTASSIUM 3.5*  --   --  4.4   --   --    CHLORIDE 100  --   --  107  --   --    CO2 17*  --   --  20  --   --    GLUCOSE 132*  --   --  81  --  98   BUN 10  --   --  9  --   --    CREATININE 0.63  --   --  0.37*  --   --    CALCIUM 9.1  --   --  7.4*  --   --     < > = values in this interval not displayed.     Recent Results (from the past 24 hour(s))   Sodium Serum (NA)    Collection Time: 06/14/22 12:04 PM   Result Value Ref Range    Sodium 134 (L) 135 - 145 mmol/L   EC-ECHOCARDIOGRAM COMPLETE W/O CONT    Collection Time: 06/14/22  5:05 PM   Result Value Ref Range    Eject.Frac. MOD BP 65.11     Eject.Frac. MOD 4C 58.85     Eject.Frac. MOD 2C 67.27     Left Ventrical Ejection Fraction 50    CRP Quantitive (Non-Cardiac)    Collection Time: 06/15/22  3:13 AM   Result Value Ref Range    Stat C-Reactive Protein 10.38 (H) 0.00 - 0.75 mg/dL   Prealbumin    Collection Time: 06/15/22  3:13 AM   Result Value Ref Range    Pre-Albumin 21.6 18.0 - 38.0 mg/dL   GLUCOSE, BLOOD    Collection Time: 06/15/22  3:13 AM   Result Value Ref Range    Glucose 98 65 - 99 mg/dL   PHOSPHORUS    Collection Time: 06/15/22  3:13 AM   Result Value Ref Range    Phosphorus 3.2 2.5 - 4.5 mg/dL   MAGNESIUM    Collection Time: 06/15/22  3:13 AM   Result Value Ref Range    Magnesium 1.6 1.5 - 2.5 mg/dL         ASSESSMENT:  Patient is a 36 y.o. female admitted with seizures due to alcohol withdrawal, subsequent TBI.    [facility] Code / Diagnosis to Support: 0002.22 - Brain Dysfunction: Traumatic, Closed Injury    Rehabilitation: Impaired ADLs and mobility  Patient is a moderate candidate, anticipate needs for OT and SLP, but patient may lack DC support.     Barriers to transfer include: Insurance authorization, TCCs to verify disposition, medical clearance and bed availability     Additional Recommendations:  Traumatic brain injury  Subarachnoid hemorrhage, sustained during lowering of seizure  - CT head with evidence of right parietal subarachnoid hemorrhage and 2 cortical sulcal  high, right parietal scalp hematoma  - Repeat CT head with mild improvement of subarachnoid hemorrhage  - Agitation likely secondary to alcohol withdrawal however may be exacerbated by subarachnoid hemorrhage  - PT/OT eval's pending    Seizures secondary to suspected alcohol withdrawal  - Neurology following, patient is currently on Keppra 500mg BID and, she is also requiring soft restraints  -patient remains on a Precedex drip    Dysphagia secondary to tongue laceration  - Patient requires a core track for nutrition, secondary to her inability to navigate swallowing due to tongue laceration  - Plastic surgery has been consulted  - SLP eval to be done today   - currently NPO on TF with cortrak, limited by tongue tissues     Disposition  -patient is currently functioning below her level of baseline, anticipate need for post acute rehab for SLP and OT  - prior to consideration for IRF level therapy, will need therapy evals and confirmation of DC support  - PM&R to follow peripherally       Medical Complexity:  Seizures  Subarachnoid hemorrhage  Alcohol withdrawal  Agitation  Tongue laceration  Dysphagia  Impaired mobility and ADLs      DVT PPX: SCDs      Thank you for allowing us to participate in the care of this patient.     Patient was seen for 88 minutes on unit/floor of which > 50% of time was spent on counseling and coordination of care regarding the above, including prognosis, risk reduction, benefits of treatment, and options for next stage of care.    Evie Tapia D.O.   Physical Medicine and Rehabilitation     Please note that this dictation was created using voice recognition software. I have made every reasonable attempt to correct obvious errors, but there may be errors of grammar and possibly content that I did not discover before finalizing the note.
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
## 2022-06-15 NOTE — THERAPY
Missed Therapy     Patient Name: Franchesca Ortez  Age:  36 y.o., Sex:  female  Medical Record #: [id number]  Today's Date: 6/15/2022           06/15/22 0858   Initial Contact Note    Initial Contact Note Order Received and Verified, Occupational Therapy Evaluation in Progress with Full Report to Follow.   Interdisciplinary Plan of Care Collaboration   Collaboration Comments OT eval held per orders pt remains on bedrest will follow up as appropriate   Session Information   Date / Session Number  6/15 HOLD

## 2022-06-15 NOTE — CARE PLAN
The patient is Watcher - Medium risk of patient condition declining or worsening    Shift Goals  Clinical Goals: neuro checks, seizure monitoring, airway monitoring  Patient Goals: rest, drink water    Progress made toward(s) clinical / shift goals:    Problem: Knowledge Deficit - Standard  Goal: Patient and family/care givers will demonstrate understanding of plan of care, disease process/condition, diagnostic tests and medications  Outcome: Progressing  Note: Patient family is updated on POC.      Problem: Safety - Medical Restraint  Goal: Remains free of injury from restraints (Restraint for Interference with Medical Device)  Outcome: Progressing  Note: Patient remains free from injury from restraints.   Goal: Free from restraint(s) (Restraint for Interference with Medical Device)  Outcome: Progressing  Note: Patient continues to require restraints to prevent the pulling of lines and medical equipment.

## 2022-06-15 NOTE — PROGRESS NOTES
Hospital Medicine Daily Progress Note    Date of Service  6/15/2022    Chief Complaint  Franchesca Ortez is a 36 y.o. female admitted 6/13/2022 with seizure.    Hospital Course  Ms. Ortez is a 36 y.o. female with history of mood disorder, alcohol dependence, seizure disorder, fibromyalgia, prior suicide attempt who presented 6/13/2022 with evaluation for seizure.  History limited as patient appears somnolent after receiving Ativan and also likely in postictal confusion state.  Per ER staff, patient had another seizure episode in the ER which was resolved with 2 mg IV Ativan administration.  CT head noted right parietal SAH, right parietal scalp hematoma, no calvarial fracture.  Trauma service, neurology services were consulted.  ICU was also consulted --recommended IMCU admission.  Admitted to medicine service.        Interval Problem Update  6/14: Ms. Ortez was evaluated and examined in the IMCU. Due to alcohol withdrawal, she is requiring an IV Precedex drip at 0.3. She is unable to safely swallow due to tongue swelling and detox thus a cortrak has been placed for meds and feeding. CT of the head showed some improvement of the SAH.  6/15: Ms. Ortez was seen and evaluated in the IMCU. She remains on an IV Precedex drip that went up to 0.6 last night and is currently at 0.5. She is in soft wrist restraints. She is tolerating cortrak feeding at 40 ml/hour which is being increased slowly due to the risk of re-feeding syndrome. Her tongue remains very swollen and bruised; she has q4 hour chlorhexidine scrubs to it. She is oriented to person and place though not year.     I have discussed this patient's plan of care and discharge plan at IDT rounds today with Case Management, Nursing, Nursing leadership, and other members of the IDT team.    Consultants/Specialty  Neurology   Critical care  Trauma   Code Status  Full Code    Disposition  Patient is not medically cleared for discharge.   Anticipate discharge to be  determined  I have placed the appropriate orders for post-discharge needs.    Review of Systems  Review of Systems   Unable to perform ROS: Acuity of condition        Physical Exam  Temp:  [36.4 °C (97.5 °F)-37.3 °C (99.2 °F)] 36.7 °C (98.1 °F)  Pulse:  [74-92] 80  Resp:  [22-32] 26  BP: ()/(62-77) 110/68  SpO2:  [75 %-100 %] 91 %    Physical Exam  Vitals and nursing note reviewed.   Constitutional:       Appearance: She is ill-appearing and toxic-appearing.   HENT:      Head:      Comments: hematoma     Nose:      Comments: cortrak nares     Mouth/Throat:      Mouth: Mucous membranes are dry.      Comments: Very swollen and bruised tongue with halitosis   Eyes:      General: No scleral icterus.     Conjunctiva/sclera: Conjunctivae normal.   Cardiovascular:      Rate and Rhythm: Normal rate and regular rhythm.   Pulmonary:      Effort: Pulmonary effort is normal.      Breath sounds: Normal breath sounds.      Comments: 2 liters of oxygen nasal cannula  Abdominal:      General: There is no distension.      Tenderness: There is no abdominal tenderness.   Musculoskeletal:      Cervical back: Normal range of motion and neck supple.      Right lower leg: No edema.      Left lower leg: No edema.      Comments: Soft wrist restraints.   Skin:     General: Skin is warm and dry.   Neurological:      General: No focal deficit present.      Mental Status: She is alert.      Comments: She moves her arms and legs equally.   Psychiatric:      Comments: Compliant with exam  She follows commands  Speech is reasonably clear         Fluids    Intake/Output Summary (Last 24 hours) at 6/15/2022 0725  Last data filed at 6/15/2022 0600  Gross per 24 hour   Intake 1940.81 ml   Output 3120 ml   Net -1179.19 ml       Laboratory  Recent Labs     06/13/22  1354 06/14/22  0251   WBC 8.6 7.4   RBC 3.10* 2.71*   HEMOGLOBIN 11.0* 9.7*   HEMATOCRIT 32.8* 29.1*   .8* 107.4*   MCH 35.5* 35.8*   MCHC 33.5* 33.3*   RDW 51.3* 52.5*    PLATELETCT 132* 85*   MPV 9.7 9.6     Recent Labs     06/13/22  1354 06/13/22  1742 06/13/22  2153 06/14/22  0251 06/14/22  1204 06/15/22  0313   SODIUM 138   < > 139 137 134*  --    POTASSIUM 3.5*  --   --  4.4  --   --    CHLORIDE 100  --   --  107  --   --    CO2 17*  --   --  20  --   --    GLUCOSE 132*  --   --  81  --  98   BUN 10  --   --  9  --   --    CREATININE 0.63  --   --  0.37*  --   --    CALCIUM 9.1  --   --  7.4*  --   --     < > = values in this interval not displayed.     Recent Labs     06/14/22  0251   INR 1.09         Recent Labs     06/13/22 1742   TRIGLYCERIDE 63   HDL 66   LDL 89       Imaging  EC-ECHOCARDIOGRAM COMPLETE W/O CONT   Final Result      CT-HEAD W/O   Final Result      1. Improvement of the areas of right parietal subarachnoid hemorrhage, with subarachnoid blood located in the parasagittal right occipital subarachnoid space.   2. No new hemorrhage in the interval.   3. Chronic right-sided sinus disease.   4. Significant improvement in right parietal scalp hematoma.         DX-ABDOMEN FOR TUBE PLACEMENT   Final Result      The tip of the feeding tube terminates over the antrum of the stomach.      CT-CSPINE WITHOUT PLUS RECONS   Final Result      1.  No acute fracture is identified.      2.  Right frontal, ethmoid, and maxillary sinus disease.      3.  1.2 cm hypodense right thyroid nodule      CT-HEAD W/O   Final Result      1. Right parietal subarachnoid hemorrhage in two cortical sulci.   2. Right parietal scalp hematoma.   3. No calvarial fracture.   4. Chronic right-sided paranasal sinus disease.      Based solely on CT findings, the brain injury guideline category is mBIG 1.      SDH < 4mm   IPH < 4mm   SAH < 3 sulci and < 1mm      The original BIG retrospective analysis found radiographic progression in 0% of BIG 1 patients and 2.6% BIG 2.      I, Dr. Golden Weiss, discussed the results of this examination directly by phone with Dr. Evelin Oconnor on 6/13/2022 at  1637 hours.      DX-CHEST-PORTABLE (1 VIEW)   Final Result      No acute cardiac or pulmonary abnormalities are identified.           Assessment/Plan  * SAH (subarachnoid hemorrhage) (HCC)- (present on admission)  Assessment & Plan  Likely had a fall with seizure  Notable right parietal SAH, right parietal scalp hematoma  Trauma and neurology consulted  Repeat CT head with improvement on 6/14  SBP goal <140  Q4 nuerochecks  Follow serial sodium levels  Non-operative management     Alcohol withdrawal (HCC)- (present on admission)  Assessment & Plan  Severe requiring an IV Precedex drip  Detox bag, MV  She is at risk of refeeding syndrome thus follow phosphorus, magnesium, potassium  Thiamine replacement        Tongue laceration- (present on admission)  Assessment & Plan  Severe trauma to her tongue  Dr. Brennan plastic surgery has been consulted  She does not have airway compromise.    Seizure (HCC)- (present on admission)  Assessment & Plan  Likely secondary to ETOH withdrawal  She has a history of childhood seizures  Neurology consulted  Keppra 500mg IV BID then change to cortrak  Seizure precautions  PRN ativan  DMV will be contacted if she has a 's license it will need to be revoked    Thrombocytopenia (HCC)- (present on admission)  Assessment & Plan  Platelets have gone down to 85 and will be checked in the morning  Likely bone marrow suppression from alcohol    Anemia- (present on admission)  Assessment & Plan  macrocytic    Agitation  Assessment & Plan  Scheduled ativan via cortrak  Precedex gtt -- wean off as tolerated    Acute encephalopathy  Assessment & Plan  Metabolic encephalopathy with likely alcohol withdrawal     Hyperglycemia  Assessment & Plan  F/u HbA1c  ISS    Hypokalemia  Assessment & Plan  Replacement given    Elevated LFTs  Assessment & Plan  Likely secondary to EtOH abuse    Traumatic rhabdomyolysis (HCC)  Assessment & Plan  Fall, seizure   thus IV fluids have been given         VTE  prophylaxis: SCDs/TEDs    I have performed a physical exam and reviewed and updated ROS and Plan today (6/15/2022). In review of yesterday's note (6/14/2022), there are no changes except as documented above.    Ms. Ortez is critically ill. 32 minutes of critical care time were spent with patient, pharmacy, and nursing and in specific management of severe alcohol detox requiring titration of an IV Precedex drip.

## 2022-06-15 NOTE — CARE PLAN
The patient is Stable - Low risk of patient condition declining or worsening    Shift Goals  Clinical Goals: seizure monitoring, neuro checks, oral care, stable vitals  Patient Goals: go home  Family Goals: ELBA    Progress made toward(s) clinical / shift goals:    Problem: Optimal Care for Alcohol Withdrawal  Goal: Optimal Care for the alcohol withdrawal patient  Outcome: Progressing     Problem: Seizure Precautions  Goal: Implementation of seizure precautions  Outcome: Progressing     Problem: Risk for Aspiration  Goal: Patient's risk for aspiration will be absent or decrease  Outcome: Progressing     Problem: Skin Integrity  Goal: Skin integrity is maintained or improved  Outcome: Progressing     Problem: Fall Risk  Goal: Patient will remain free from falls  Outcome: Progressing     Problem: Safety - Medical Restraint  Goal: Remains free of injury from restraints (Restraint for Interference with Medical Device)  Outcome: Progressing       Patient is not progressing towards the following goals:  Problem: Knowledge Deficit - Standard  Goal: Patient and family/care givers will demonstrate understanding of plan of care, disease process/condition, diagnostic tests and medications  Outcome: Not Met     Problem: Lifestyle Changes  Goal: Patient's ability to identify lifestyle changes and available resources to help reduce recurrence of condition will improve  Outcome: Not Met     Problem: Psychosocial  Goal: Patient's level of anxiety will decrease  Outcome: Not Met     Problem: Safety - Medical Restraint  Goal: Free from restraint(s) (Restraint for Interference with Medical Device)  Outcome: Not Met

## 2022-06-15 NOTE — CARE PLAN
The patient is Stable - Low risk of patient condition declining or worsening    Shift Goals  Clinical Goals: seizure monitoring, neuro checks, oral care, stable vitals  Patient Goals: go home  Family Goals: ELBA    Progress made toward(s) clinical / shift goals:    Problem: Knowledge Deficit - Standard  Goal: Patient and family/care givers will demonstrate understanding of plan of care, disease process/condition, diagnostic tests and medications  Outcome: Progressing  Note: Patient is updated on POC.      Problem: Safety - Medical Restraint  Goal: Remains free of injury from restraints (Restraint for Interference with Medical Device)  Outcome: Progressing  Note: Patient remains free from injury.  Goal: Free from restraint(s) (Restraint for Interference with Medical Device)  Outcome: Progressing  Note: Patient is free from restraints. Education reinforced.

## 2022-06-15 NOTE — DISCHARGE PLANNING
Care Transition Team Discharge Planning    Anticipated Discharge Disposition: d/c to ETOH rehab     Action: Lsw attended rounds. Pt is on 2 LPM O2 nasal cannula. Pt's tongue is swollen and skin is sloghing off. No infection noted. Pt is off restraints, and A/Ox4. Pt in communication w/ her mom this AM. Pt is still has mumbled speech. Family is pushing for pt to go to ETOH rehab. Mom on phone and dad at bedside. Pt asking if she can smoke a cigarette. Rn requesting a nicotine patch. Rn and MD do not think pt will qualify for rehab as she is very strong.      Lsw sent email w/ attached resources for both inpt and outpt alcohol/drug rehab in the local area. Lsw explained in email the rehab choice acquired this AM is for physical rehab only. Lsw suggested pt's mom call the resources for substance use disorder.          Barriers to Discharge: medical clearance    Plan: Lsw will continue to follow, and assist w/ d/c planning.

## 2022-06-16 ENCOUNTER — APPOINTMENT (OUTPATIENT)
Dept: RADIOLOGY | Facility: MEDICAL CENTER | Age: 37
DRG: 894 | End: 2022-06-16
Attending: HOSPITALIST

## 2022-06-16 PROBLEM — F17.200 TOBACCO DEPENDENCE: Status: ACTIVE | Noted: 2022-06-16

## 2022-06-16 LAB
ALBUMIN SERPL BCP-MCNC: 3.5 G/DL (ref 3.2–4.9)
ALBUMIN/GLOB SERPL: 0.9 G/DL
ALP SERPL-CCNC: 104 U/L (ref 30–99)
ALT SERPL-CCNC: 56 U/L (ref 2–50)
ANION GAP SERPL CALC-SCNC: 15 MMOL/L (ref 7–16)
AST SERPL-CCNC: 98 U/L (ref 12–45)
BASOPHILS # BLD AUTO: 0.7 % (ref 0–1.8)
BASOPHILS # BLD: 0.06 K/UL (ref 0–0.12)
BILIRUB SERPL-MCNC: 1.3 MG/DL (ref 0.1–1.5)
BUN SERPL-MCNC: 10 MG/DL (ref 8–22)
CALCIUM SERPL-MCNC: 8.9 MG/DL (ref 8.5–10.5)
CHLORIDE SERPL-SCNC: 97 MMOL/L (ref 96–112)
CO2 SERPL-SCNC: 18 MMOL/L (ref 20–33)
CREAT SERPL-MCNC: 0.44 MG/DL (ref 0.5–1.4)
EOSINOPHIL # BLD AUTO: 0.07 K/UL (ref 0–0.51)
EOSINOPHIL NFR BLD: 0.8 % (ref 0–6.9)
ERYTHROCYTE [DISTWIDTH] IN BLOOD BY AUTOMATED COUNT: 50.4 FL (ref 35.9–50)
GFR SERPLBLD CREATININE-BSD FMLA CKD-EPI: 128 ML/MIN/1.73 M 2
GLOBULIN SER CALC-MCNC: 4.1 G/DL (ref 1.9–3.5)
GLUCOSE SERPL-MCNC: 102 MG/DL (ref 65–99)
HCT VFR BLD AUTO: 30.1 % (ref 37–47)
HGB BLD-MCNC: 10.3 G/DL (ref 12–16)
IMM GRANULOCYTES # BLD AUTO: 0.07 K/UL (ref 0–0.11)
IMM GRANULOCYTES NFR BLD AUTO: 0.8 % (ref 0–0.9)
LYMPHOCYTES # BLD AUTO: 1.42 K/UL (ref 1–4.8)
LYMPHOCYTES NFR BLD: 16.1 % (ref 22–41)
MAGNESIUM SERPL-MCNC: 1.7 MG/DL (ref 1.5–2.5)
MCH RBC QN AUTO: 35.4 PG (ref 27–33)
MCHC RBC AUTO-ENTMCNC: 34.2 G/DL (ref 33.6–35)
MCV RBC AUTO: 103.4 FL (ref 81.4–97.8)
MONOCYTES # BLD AUTO: 0.83 K/UL (ref 0–0.85)
MONOCYTES NFR BLD AUTO: 9.4 % (ref 0–13.4)
NEUTROPHILS # BLD AUTO: 6.36 K/UL (ref 2–7.15)
NEUTROPHILS NFR BLD: 72.2 % (ref 44–72)
NRBC # BLD AUTO: 0 K/UL
NRBC BLD-RTO: 0 /100 WBC
PHOSPHATE SERPL-MCNC: 2.5 MG/DL (ref 2.5–4.5)
PLATELET # BLD AUTO: 173 K/UL (ref 164–446)
PMV BLD AUTO: 9.7 FL (ref 9–12.9)
POTASSIUM SERPL-SCNC: 3.5 MMOL/L (ref 3.6–5.5)
PROT SERPL-MCNC: 7.6 G/DL (ref 6–8.2)
RBC # BLD AUTO: 2.91 M/UL (ref 4.2–5.4)
SODIUM SERPL-SCNC: 130 MMOL/L (ref 135–145)
WBC # BLD AUTO: 8.8 K/UL (ref 4.8–10.8)

## 2022-06-16 PROCEDURE — A9270 NON-COVERED ITEM OR SERVICE: HCPCS | Performed by: STUDENT IN AN ORGANIZED HEALTH CARE EDUCATION/TRAINING PROGRAM

## 2022-06-16 PROCEDURE — 700101 HCHG RX REV CODE 250: Performed by: STUDENT IN AN ORGANIZED HEALTH CARE EDUCATION/TRAINING PROGRAM

## 2022-06-16 PROCEDURE — A9270 NON-COVERED ITEM OR SERVICE: HCPCS | Performed by: NURSE PRACTITIONER

## 2022-06-16 PROCEDURE — A9270 NON-COVERED ITEM OR SERVICE: HCPCS | Performed by: HOSPITALIST

## 2022-06-16 PROCEDURE — 700102 HCHG RX REV CODE 250 W/ 637 OVERRIDE(OP): Performed by: HOSPITALIST

## 2022-06-16 PROCEDURE — 700102 HCHG RX REV CODE 250 W/ 637 OVERRIDE(OP): Performed by: NURSE PRACTITIONER

## 2022-06-16 PROCEDURE — 700105 HCHG RX REV CODE 258: Performed by: STUDENT IN AN ORGANIZED HEALTH CARE EDUCATION/TRAINING PROGRAM

## 2022-06-16 PROCEDURE — 97166 OT EVAL MOD COMPLEX 45 MIN: CPT

## 2022-06-16 PROCEDURE — 80053 COMPREHEN METABOLIC PANEL: CPT

## 2022-06-16 PROCEDURE — 85025 COMPLETE CBC W/AUTO DIFF WBC: CPT

## 2022-06-16 PROCEDURE — 700102 HCHG RX REV CODE 250 W/ 637 OVERRIDE(OP): Performed by: STUDENT IN AN ORGANIZED HEALTH CARE EDUCATION/TRAINING PROGRAM

## 2022-06-16 PROCEDURE — 700111 HCHG RX REV CODE 636 W/ 250 OVERRIDE (IP): Performed by: HOSPITALIST

## 2022-06-16 PROCEDURE — 92610 EVALUATE SWALLOWING FUNCTION: CPT

## 2022-06-16 PROCEDURE — 97162 PT EVAL MOD COMPLEX 30 MIN: CPT

## 2022-06-16 PROCEDURE — 84100 ASSAY OF PHOSPHORUS: CPT

## 2022-06-16 PROCEDURE — 99233 SBSQ HOSP IP/OBS HIGH 50: CPT | Performed by: HOSPITALIST

## 2022-06-16 PROCEDURE — 700111 HCHG RX REV CODE 636 W/ 250 OVERRIDE (IP): Performed by: STUDENT IN AN ORGANIZED HEALTH CARE EDUCATION/TRAINING PROGRAM

## 2022-06-16 PROCEDURE — 83735 ASSAY OF MAGNESIUM: CPT

## 2022-06-16 PROCEDURE — 770001 HCHG ROOM/CARE - MED/SURG/GYN PRIV*

## 2022-06-16 RX ORDER — GAUZE BANDAGE 2" X 2"
100 BANDAGE TOPICAL DAILY
Status: DISCONTINUED | OUTPATIENT
Start: 2022-06-17 | End: 2022-06-17 | Stop reason: HOSPADM

## 2022-06-16 RX ORDER — MAGNESIUM SULFATE HEPTAHYDRATE 40 MG/ML
2 INJECTION, SOLUTION INTRAVENOUS ONCE
Status: COMPLETED | OUTPATIENT
Start: 2022-06-16 | End: 2022-06-16

## 2022-06-16 RX ORDER — NICOTINE 21 MG/24HR
21 PATCH, TRANSDERMAL 24 HOURS TRANSDERMAL ONCE
Status: DISCONTINUED | OUTPATIENT
Start: 2022-06-16 | End: 2022-06-17 | Stop reason: HOSPADM

## 2022-06-16 RX ADMIN — LEVETIRACETAM 500 MG: 500 TABLET, FILM COATED ORAL at 17:25

## 2022-06-16 RX ADMIN — CHLORHEXIDINE GLUCONATE 0.12% ORAL RINSE 15 ML: 1.2 LIQUID ORAL at 05:16

## 2022-06-16 RX ADMIN — NICOTINE TRANSDERMAL SYSTEM 21 MG: 21 PATCH, EXTENDED RELEASE TRANSDERMAL at 05:17

## 2022-06-16 RX ADMIN — DOCUSATE SODIUM 50 MG AND SENNOSIDES 8.6 MG 2 TABLET: 8.6; 5 TABLET, FILM COATED ORAL at 17:25

## 2022-06-16 RX ADMIN — FOLIC ACID 1 MG: 5 INJECTION, SOLUTION INTRAMUSCULAR; INTRAVENOUS; SUBCUTANEOUS at 06:00

## 2022-06-16 RX ADMIN — NICOTINE TRANSDERMAL SYSTEM 21 MG: 21 PATCH, EXTENDED RELEASE TRANSDERMAL at 22:15

## 2022-06-16 RX ADMIN — CHLORHEXIDINE GLUCONATE 0.12% ORAL RINSE 15 ML: 1.2 LIQUID ORAL at 02:00

## 2022-06-16 RX ADMIN — CHLORHEXIDINE GLUCONATE 0.12% ORAL RINSE 15 ML: 1.2 LIQUID ORAL at 10:46

## 2022-06-16 RX ADMIN — CHLORHEXIDINE GLUCONATE 0.12% ORAL RINSE 15 ML: 1.2 LIQUID ORAL at 14:08

## 2022-06-16 RX ADMIN — LORAZEPAM 2 MG: 2 TABLET ORAL at 01:30

## 2022-06-16 RX ADMIN — MAGNESIUM SULFATE HEPTAHYDRATE 2 G: 40 INJECTION, SOLUTION INTRAVENOUS at 10:46

## 2022-06-16 RX ADMIN — CHLORHEXIDINE GLUCONATE 0.12% ORAL RINSE 15 ML: 1.2 LIQUID ORAL at 17:25

## 2022-06-16 RX ADMIN — LORAZEPAM 2 MG: 2 TABLET ORAL at 05:17

## 2022-06-16 RX ADMIN — ACETAMINOPHEN 650 MG: 325 TABLET, FILM COATED ORAL at 21:44

## 2022-06-16 RX ADMIN — THIAMINE HYDROCHLORIDE 100 MG: 100 INJECTION, SOLUTION INTRAMUSCULAR; INTRAVENOUS at 05:16

## 2022-06-16 RX ADMIN — ACETAMINOPHEN 650 MG: 325 TABLET, FILM COATED ORAL at 08:15

## 2022-06-16 RX ADMIN — POTASSIUM BICARBONATE 25 MEQ: 978 TABLET, EFFERVESCENT ORAL at 10:46

## 2022-06-16 RX ADMIN — LEVETIRACETAM 500 MG: 500 TABLET, FILM COATED ORAL at 05:16

## 2022-06-16 RX ADMIN — THERA TABS 1 TABLET: TAB at 05:16

## 2022-06-16 ASSESSMENT — LIFESTYLE VARIABLES
HEADACHE, FULLNESS IN HEAD: VERY MILD
TOTAL SCORE: 1
TREMOR: NO TREMOR
AUDITORY DISTURBANCES: NOT PRESENT
TREMOR: NO TREMOR
VISUAL DISTURBANCES: NOT PRESENT
ORIENTATION AND CLOUDING OF SENSORIUM: ORIENTED AND CAN DO SERIAL ADDITIONS
SUBSTANCE_ABUSE: 1
AUDITORY DISTURBANCES: NOT PRESENT
AGITATION: SOMEWHAT MORE THAN NORMAL ACTIVITY
TOTAL SCORE: 3
NAUSEA AND VOMITING: NO NAUSEA AND NO VOMITING
VISUAL DISTURBANCES: NOT PRESENT
PAROXYSMAL SWEATS: NO SWEAT VISIBLE
PAROXYSMAL SWEATS: NO SWEAT VISIBLE
ORIENTATION AND CLOUDING OF SENSORIUM: ORIENTED AND CAN DO SERIAL ADDITIONS
ANXIETY: MILDLY ANXIOUS
ANXIETY: MILDLY ANXIOUS
AGITATION: NORMAL ACTIVITY
HEADACHE, FULLNESS IN HEAD: NOT PRESENT
NAUSEA AND VOMITING: NO NAUSEA AND NO VOMITING

## 2022-06-16 ASSESSMENT — COGNITIVE AND FUNCTIONAL STATUS - GENERAL
MOBILITY SCORE: 18
SUGGESTED CMS G CODE MODIFIER MOBILITY: CK
WALKING IN HOSPITAL ROOM: A LITTLE
DAILY ACTIVITIY SCORE: 17
HELP NEEDED FOR BATHING: A LOT
DRESSING REGULAR UPPER BODY CLOTHING: A LITTLE
MOVING FROM LYING ON BACK TO SITTING ON SIDE OF FLAT BED: A LITTLE
SUGGESTED CMS G CODE MODIFIER DAILY ACTIVITY: CK
TOILETING: A LITTLE
EATING MEALS: A LITTLE
MOVING TO AND FROM BED TO CHAIR: A LITTLE
PERSONAL GROOMING: A LITTLE
CLIMB 3 TO 5 STEPS WITH RAILING: A LOT
STANDING UP FROM CHAIR USING ARMS: A LITTLE
DRESSING REGULAR LOWER BODY CLOTHING: A LITTLE

## 2022-06-16 ASSESSMENT — ENCOUNTER SYMPTOMS
COUGH: 0
CHILLS: 0
FEVER: 0
MEMORY LOSS: 1
NERVOUS/ANXIOUS: 1

## 2022-06-16 ASSESSMENT — PAIN DESCRIPTION - PAIN TYPE: TYPE: ACUTE PAIN

## 2022-06-16 ASSESSMENT — GAIT ASSESSMENTS
DEVIATION: ATAXIC;BRADYKINETIC;OTHER (COMMENT)
DISTANCE (FEET): 150
GAIT LEVEL OF ASSIST: CONTACT GUARD ASSIST
ASSISTIVE DEVICE: FRONT WHEEL WALKER

## 2022-06-16 ASSESSMENT — ACTIVITIES OF DAILY LIVING (ADL): TOILETING: INDEPENDENT

## 2022-06-16 NOTE — THERAPY
Physical Therapy Contact Note     Patient Name: Franchesca Ortez  Age:  36 y.o., Sex:  female  Medical Record #: [id number]  Today's Date: 6/16/2022    Pt continues to hold strict bed rest orders.  Will hold eval until orders removed and pt appropriate for OOB activity.    Milton Medel, PT, DPT [id number]

## 2022-06-16 NOTE — THERAPY
Physical Therapy   Initial Evaluation     Patient Name: Franchesca Ortez  Age:  36 y.o., Sex:  female  Medical Record #: [id number]  Today's Date: 6/16/2022     Precautions  Precautions: Fall Risk;Swallow Precautions ( See Comments);Other (See Comments)  Comments: Sz precautions    Assessment  Patient is 36 y.o. female presenting for GLF d/t sz resulting in R parietal SAH, along w/ current etoh withdrawal and a PMH of mood and sz disorder along w/ etoh dependence.  Currently the pt is limited by poor insight into current impairments, reduced LE strength and balance, and decreased coordination.  She is able to complete bed mobility SBA w/ hob elevated and no bed rails, along w/ STS eob w/ fww CGA.  She initially required Augustin for stability during gait 2/2 ataxia and scissoring gait pattern, however improved w/ further ambulation requiring only CGA and TC for straight line ambulation using FWW.  Pt limited to 150' ambulation 2/2 fatigue.  Recommend pt dc home w/ HH PT for home safety and further PT needs so long as pt has assistance/spv available during the day for ambulatory tasks and IADL's, otherwise recommend placement d/t high risk of future falls.  Will follow for acute PT needs.      Plan    Recommend Physical Therapy 4 times per week until therapy goals are met for the following treatments:  Bed Mobility, Community Re-integration, Equipment, Gait Training, Manual Therapy, Neuro Re-Education / Balance, Orthotics Training, Self Care/Home Evaluation, Stair Training, Therapeutic Activities, and Therapeutic Exercises    DC Equipment Recommendations: Unable to determine at this time  Discharge Recommendations: Recommend home health for continued physical therapy services (so long as pt has assistance/spv available during the day for ambulatory tasks and IADL's)       Subjective/Objective       06/16/22 1136   Prior Living Situation   Prior Services Home-Independent   Housing / Facility [facility]   Steps Into  Home 3   Equipment Owned None   Lives with - Patient's Self Care Capacity Unrelated Adult   Comments pt lives w/ 2 roommates, reports one works at night and the other during the day, states both are able to assist the pt w/ IADL's and mobility needs at home   Prior Level of Functional Mobility   Bed Mobility Independent   Transfer Status Independent   Ambulation Independent   Distance Ambulation (Feet)   (community distances)   Assistive Devices Used None   Stairs Independent   History of Falls   History of Falls Yes   Date of Last Fall   (reason for admit)   Cognition    Cognition / Consciousness X   Speech/ Communication Delayed Responses;Dysarthric   Level of Consciousness Alert   Comments pt pleasant and cooperative, however impulsive and poor insight in regards to current deficits   Passive ROM Lower Body   Passive ROM Lower Body WDL   Active ROM Lower Body    Active ROM Lower Body  WDL   Comments observed during functional mobility   Strength Lower Body   Lower Body Strength  X   Comments generalized weakness noted BLE   Sensation Lower Body   Lower Extremity Sensation   WDL   Comments sensation intact to LT/DP BLE   Coordination Lower Body    Coordination Lower Body  X   Comments pt initially ataxic during gait, however improved w/ further ambulation   Vision   Vision Comments pt denies any changes in vision, smooth pursuit intact, B peripheral fields intact   Balance Assessment   Sitting Balance (Static) Fair   Sitting Balance (Dynamic) Fair   Standing Balance (Static) Fair -   Standing Balance (Dynamic) Fair -   Weight Shift Sitting Fair   Weight Shift Standing Fair   Comments stand w/ fww   Gait Analysis   Gait Level Of Assist Contact Guard Assist  (initially Augustin d/t ataxia, however improved w/ exposure)   Assistive Device Front Wheel Walker   Distance (Feet) 150   # of Times Distance was Traveled 1   Deviation Ataxic;Bradykinetic;Other (Comment)  (requires TC to walk in straight line)   Weight Bearing  Status no restrictions   Vision Deficits Impacting Mobility denies   Comments distance limited by fatigue   Bed Mobility    Supine to Sit Standby Assist   Sit to Supine   (pt position seated in chair end of session)   Scooting Standby Assist   Comments hob elevated, no rails   Functional Mobility   Sit to Stand Contact Guard Assist   Bed, Chair, Wheelchair Transfer Contact Guard Assist   Transfer Method Stand Step   Mobility STS eob w/ fww, eob->bathroom->hallway->chair w/ fww   Activity Tolerance   Sitting in Chair >30min   Sitting Edge of Bed 10min   Standing 15min   Edema / Skin Assessment   Edema / Skin  Not Assessed   Short Term Goals    Short Term Goal # 1 The pt will demo supine<>sit eob w/ hob flat and no rails in 6 visits for independence w/ bed mobility.   Short Term Goal # 2 The pt will demo stand step txr eob<>chair w/ no AD spv in 6 visits for independence w/ OOB mobility tasks.   Short Term Goal # 3 Pt will demo gait 150' using no AD spv in a moving environment in 6 visits for household ambulation.   Short Term Goal # 4 Pt will demo ability to ascend/descend 3 stairs w/ UE support spv in 6 visits for access to her home.   Education Group   Education Provided Role of Physical Therapist;Use of Assistive Device   Role of Physical Therapist Patient Response Patient;Acceptance;Explanation;Demonstration;Action Demonstration   Use of Assistive Device Patient Response Patient;Acceptance;Explanation;Demonstration;Action Demonstration;Reinforcement Needed   Additional Comments pt receptive of [facility] provided   Problem List    Problems Pain;Impaired Bed Mobility;Impaired Transfers;Impaired Ambulation;Functional Strength Deficit;Impaired Balance;Impaired Coordination;Decreased Activity Tolerance;Safety Awareness Deficits / Cognition   Session Information   Date / Session Number  6/16- 1 (1/4, 6/22)

## 2022-06-16 NOTE — CARE PLAN
The patient is Watcher - Medium risk of patient condition declining or worsening    Shift Goals  Clinical Goals: Monitor seizures, frequent oral care, reinsert cortrak, keep precedex and restraints off  Patient Goals: Go home to care for self  Family Goals: ELBA    Progress made toward(s) clinical / shift goals:      Education and updates provided to patient regarding future plan of nursing care, kept off of precedex, no non-violent restraints utilized, frequent mouth rinses continued, ambulated to bathroom with assist of two, cortrak re-inserted, Seizure free.

## 2022-06-16 NOTE — DISCHARGE PLANNING
Face sheet now indicates Medicaid pending, therapy evaluations indicate home with HH, Messaged CM to clarify insurance.

## 2022-06-16 NOTE — THERAPY
Missed Therapy     Patient Name: Franchesca Ortez  Age:  36 y.o., Sex:  female  Medical Record #: [id number]  Today's Date: 6/16/2022    Pt continues to be on bedrest; will hold OT eval and re-attempt as appropriate/able once bedrest order d/c'd.

## 2022-06-16 NOTE — THERAPY
Speech Language Pathology   Clinical Swallow Evaluation     Patient Name: Franchesca Ortez  AGE:  36 y.o., SEX:  female  Medical Record #: [id number]  Today's Date: 6/16/2022          Assessment    H&P: Witnessed seizure; Patient has difficulty managing secretions due to excessive tongue swelling and mucus.    CT HEAD:   1. Improvement of the areas of right parietal subarachnoid hemorrhage, with subarachnoid blood located in the parasagittal right occipital subarachnoid space.  2. No new hemorrhage in the interval.  3. Chronic right-sided sinus disease.  4. Significant improvement in right parietal scalp hematoma.    PMHx: abuse, depression and history of a suicide attempt    Level of Consciousness: Alert, Awake  Affect/Behavior: Appropriate, Calm  Follows Directives: Yes  Orientation: Oriented x 4  Hearing: Functional hearing  Vision: Functional vision    Prior Living Situation & Level of Function:  Patient previously on a regular diet, denied hx of dysphagia    Oral Mechanism Evaluation  Facial Symmetry: Equal  Facial Sensation: Equal  Labial Observations: Open mouth posture  Lingual Observations: Midline  Dentition: Good  Comments: Lingual edema with bruising and laceration appreciated. Patient is able to protrude tongue, which is a significant improvement from yesterday as previous edema prevented protrusion.     Voice  Quality: WFL  Resonance: WFL  Intensity: Alternating, Appropriate  Cough: WFL  Comments:     Current Method of Nutrition   NPO until cleared by speech pathology, NGT/Cortrak    Assessment  Positioning: Espinosa's (60-90 degrees)  Bolus Administration: Patient  Oxygen Requirements: Room Air  Factor(s) Affecting Performance: None    Swallowing Trials  Ice: WFL  Thin Liquid (TN0): WFL  Liquidised (LQ3): WFL  Pureed (PU4): WFL    Comments:    Patient with improved secretion management and more intelligible speech per RN, requesting swallow evaluation. Patient noted to have restricted ROM of lingual  "musculature but is intelligible and requesting PO trials. Cough x1 appreciated with sips of thin liquids but otherwise no overt s/sx of aspiration appreciated. Patient taking small sips and bites at a slow rate of pace. Suspect impaired AP transit 2/2 enlarged tongue but patient with adequate oral clearance.      Clinical Impressions    Oropharyngeal dysphagia appreciated 2/2 lingual edema, etoh abuse, SAH. Patient requires a short-term modified diet at that time due to oral phase dysphagia and presumed pharyngeal dysphagia. Patient does not appear to require a diagnostic at this time, but will continue to monitor pending clinical progress.      Recommendations  1.  Full liquid diet with thins   2.  Instrumentation: None indicated at this time  3.  Swallowing Instructions & Precautions:   Supervision: Direct supervision during meals  Positioning: Fully upright and midline during oral intake  Medication: Crush with applesauce or puree, as appropriate, Non Oral   Strategies: Small bites/sips  Oral Care: Q4h    Plan    Recommend Speech Therapy 3 times per week until therapy goals are met for the following treatments:  Dysphagia Training.     Objective       06/16/22 0846   Vitals   O2 (LPM) 0   O2 Delivery Device None - Room Air   Pain 0 - 10 Group   Therapist Pain Assessment Post Activity Pain Same as Prior to Activity;Nurse Notified;0   Prior Level Of Function   Patient's Primary Language English   Patient / Family Goals   Patient / Family Goal #1 \"I want to take this tube out\" refering to TF   Short Term Goals   Short Term Goal # 1 Pt will consume full liquid diet without any overt s/sx of aspiration   Education Group   Education Provided Dysphagia   Dysphagia Patient Response Patient;Acceptance;Explanation;Verbal Demonstration   Problem List   Problem List Dysphagia     "

## 2022-06-16 NOTE — CARE PLAN
Problem: Optimal Care for Alcohol Withdrawal  Goal: Optimal Care for the alcohol withdrawal patient  Outcome: Progressing     Problem: Psychosocial  Goal: Patient's level of anxiety will decrease  Outcome: Progressing   The patient is Watcher - Medium risk of patient condition declining or worsening    Shift Goals  Clinical Goals: mointor for seizure activity, pain control ST/OT/PT eval, encourage OOB  Patient Goals: go home before birthday, oral nutrition  Family Goals: NA    Progress made toward(s) clinical / shift goals:  monitor for seizure activity, seen by pt/ot and st today, encourage mobility     Patient is not progressing towards the following goals:

## 2022-06-16 NOTE — PROGRESS NOTES
Cortrak Placement    Tube Team verified patient name and medical record number prior to tube placement.  Cortrak tube (110cm, 10 Divehi) placed at 80 cm in left nare.  Per Cortrak picture, tube appears to be in the stomach.  Nursing Instructions: Awaiting KUB to confirm placement before use for medications or feeding. Once placement confirmed, flush tube with 30 ml of water, and then remove and save stylet, in patient medication drawer.

## 2022-06-16 NOTE — PROGRESS NOTES
Hospital Medicine Daily Progress Note    Date of Service  6/16/2022    Chief Complaint  Franchesca Ortez is a 36 y.o. female admitted 6/13/2022 with seizure.    Hospital Course  Ms. Ortez is a 36 y.o. female with history of mood disorder, alcohol dependence, seizure disorder, fibromyalgia, prior suicide attempt who presented 6/13/2022 with evaluation for seizure.  History limited as patient appears somnolent after receiving Ativan and also likely in postictal confusion state.  Per ER staff, patient had another seizure episode in the ER which was resolved with 2 mg IV Ativan administration.  CT head noted right parietal SAH, right parietal scalp hematoma, no calvarial fracture.  Trauma service, neurology services were consulted.  ICU was also consulted --recommended IMCU admission.  Admitted to medicine service.        Interval Problem Update  6/14: Ms. Ortez was evaluated and examined in the IMCU. Due to alcohol withdrawal, she is requiring an IV Precedex drip at 0.3. She is unable to safely swallow due to tongue swelling and detox thus a cortrak has been placed for meds and feeding. CT of the head showed some improvement of the SAH.  6/15: Ms. Ortez was seen and evaluated in the IMCU. She remains on an IV Precedex drip that went up to 0.6 last night and is currently at 0.5. She is in soft wrist restraints. She is tolerating cortrak feeding at 40 ml/hour which is being increased slowly due to the risk of re-feeding syndrome. Her tongue remains very swollen and bruised; she has q4 hour chlorhexidine scrubs to it. She is oriented to person and place though not year.   6/16: Ms. Palomo was evaluated and examined in the IMCU. She is off of the Precedex drip. Her mentation is back to baseline. She passed the swallow eval by speech path with full liquid diet though will remain on cortrak for meds and feeding. We discussed alcohol cessation.     I have discussed this patient's plan of care and discharge plan at IDT rounds  today with Case Management, Nursing, Nursing leadership, and other members of the IDT team.    Consultants/Specialty  Neurology   Critical care  Trauma   Physiatry   Code Status  Full Code    Disposition  Patient is not medically cleared for discharge.   Anticipate discharge to be determined  I have placed the appropriate orders for post-discharge needs.    Review of Systems  Review of Systems   Constitutional: Negative for chills and fever.   Respiratory: Negative for cough.    Cardiovascular: Negative for chest pain.   Gastrointestinal:        Tolerating clears and full liquid diet   Psychiatric/Behavioral: Positive for memory loss and substance abuse. The patient is nervous/anxious.    All other systems reviewed and are negative.       Physical Exam  Temp:  [36.8 °C (98.2 °F)] 36.8 °C (98.2 °F)  Pulse:  [] 119  Resp:  [18-28] 25  BP: (112-138)/(63-97) 118/83  SpO2:  [91 %-97 %] 92 %    Physical Exam  Vitals and nursing note reviewed.   Constitutional:       General: She is not in acute distress.     Appearance: She is not ill-appearing or toxic-appearing.   HENT:      Head:      Comments: hematoma     Nose:      Comments: cortrak nares     Mouth/Throat:      Mouth: Mucous membranes are moist.      Comments: Moderately swollen and bruised tongue   Eyes:      General: No scleral icterus.     Conjunctiva/sclera: Conjunctivae normal.   Cardiovascular:      Rate and Rhythm: Normal rate and regular rhythm.   Pulmonary:      Effort: Pulmonary effort is normal.      Breath sounds: Normal breath sounds.      Comments: 2 liters of oxygen nasal cannula  Abdominal:      General: There is no distension.      Tenderness: There is no abdominal tenderness.   Musculoskeletal:      Cervical back: Normal range of motion and neck supple.      Right lower leg: No edema.      Left lower leg: No edema.      Comments: Soft wrist restraints.   Skin:     General: Skin is warm and dry.   Neurological:      General: No focal deficit  present.      Mental Status: She is alert and oriented to person, place, and time.   Psychiatric:      Comments: Compliant with exam  She follows commands  Speech is clear         Fluids    Intake/Output Summary (Last 24 hours) at 6/16/2022 0714  Last data filed at 6/16/2022 0600  Gross per 24 hour   Intake 388.77 ml   Output 1270 ml   Net -881.23 ml       Laboratory  Recent Labs     06/13/22  1354 06/14/22  0251 06/16/22  0432   WBC 8.6 7.4 8.8   RBC 3.10* 2.71* 2.91*   HEMOGLOBIN 11.0* 9.7* 10.3*   HEMATOCRIT 32.8* 29.1* 30.1*   .8* 107.4* 103.4*   MCH 35.5* 35.8* 35.4*   MCHC 33.5* 33.3* 34.2   RDW 51.3* 52.5* 50.4*   PLATELETCT 132* 85* 173   MPV 9.7 9.6 9.7     Recent Labs     06/13/22  1354 06/13/22  1742 06/14/22  0251 06/14/22  1204 06/15/22  0313 06/16/22  0432   SODIUM 138   < > 137 134*  --  130*   POTASSIUM 3.5*  --  4.4  --   --  3.5*   CHLORIDE 100  --  107  --   --  97   CO2 17*  --  20  --   --  18*   GLUCOSE 132*  --  81  --  98 102*   BUN 10  --  9  --   --  10   CREATININE 0.63  --  0.37*  --   --  0.44*   CALCIUM 9.1  --  7.4*  --   --  8.9    < > = values in this interval not displayed.     Recent Labs     06/14/22  0251   INR 1.09         Recent Labs     06/13/22 1742   TRIGLYCERIDE 63   HDL 66   LDL 89       Imaging  DX-ABDOMEN FOR TUBE PLACEMENT   Final Result      Feeding tube tip projects in the first portion of the duodenum.      EC-ECHOCARDIOGRAM COMPLETE W/O CONT   Final Result      CT-HEAD W/O   Final Result      1. Improvement of the areas of right parietal subarachnoid hemorrhage, with subarachnoid blood located in the parasagittal right occipital subarachnoid space.   2. No new hemorrhage in the interval.   3. Chronic right-sided sinus disease.   4. Significant improvement in right parietal scalp hematoma.         DX-ABDOMEN FOR TUBE PLACEMENT   Final Result      The tip of the feeding tube terminates over the antrum of the stomach.      CT-CSPINE WITHOUT PLUS RECONS   Final  Result      1.  No acute fracture is identified.      2.  Right frontal, ethmoid, and maxillary sinus disease.      3.  1.2 cm hypodense right thyroid nodule      CT-HEAD W/O   Final Result      1. Right parietal subarachnoid hemorrhage in two cortical sulci.   2. Right parietal scalp hematoma.   3. No calvarial fracture.   4. Chronic right-sided paranasal sinus disease.      Based solely on CT findings, the brain injury guideline category is mBIG 1.      SDH < 4mm   IPH < 4mm   SAH < 3 sulci and < 1mm      The original BIG retrospective analysis found radiographic progression in 0% of BIG 1 patients and 2.6% BIG 2.      I, Dr. Golden Weiss, discussed the results of this examination directly by phone with Dr. Evelin Oconnor on 6/13/2022 at 1637 hours.      DX-CHEST-PORTABLE (1 VIEW)   Final Result      No acute cardiac or pulmonary abnormalities are identified.           Assessment/Plan  * SAH (subarachnoid hemorrhage) (HCC)- (present on admission)  Assessment & Plan  Likely had a fall with seizure  Notable right parietal SAH, right parietal scalp hematoma  Trauma and neurology consulted  Repeat CT head with improvement on 6/14  SBP goal <140  Serial sodium levels have been normal  Non-operative management   Non-aneurysmal thus nimodipine is not indicated.  PT/OT and out of bed as tolerated.    Alcohol withdrawal (HCC)- (present on admission)  Assessment & Plan  Severe requiring an IV Precedex drip  IV Detox bag was given  Scheduled ativan via cortrak will be stopped on 6/16  She is at risk of refeeding syndrome thus follow phosphorus, magnesium, potassium levels daily and replace accordingly  Thiamine replacement   consulted for outpatient detox options.        Tongue laceration- (present on admission)  Assessment & Plan  Severe trauma to her tongue  Dr. Brennan plastic surgery has been consulted  She does not have airway compromise.  q4 hour chlorhexidine cleaning.    Full liquid diet    Seizure  (HCC)- (present on admission)  Assessment & Plan  Likely secondary to ETOH withdrawal  She has a history of childhood seizures  Neurology consulted  Keppra 500mg IV BID changed to cortrak on 6/15  Seizure precautions  PRN ativan for seizure   She does not have a drivers license     Tobacco dependence- (present on admission)  Assessment & Plan  Nicotine patch for withdrawal     Thrombocytopenia (HCC)- (present on admission)  Assessment & Plan  CBC ordered for the morning.  Likely bone marrow suppression from alcohol    Anemia- (present on admission)  Assessment & Plan  macrocytic    Agitation  Assessment & Plan  From detox    Acute encephalopathy- (present on admission)  Assessment & Plan  Metabolic encephalopathy secondary to alcohol withdrawal now resolved    Hypokalemia- (present on admission)  Assessment & Plan  Replacement given    Elevated LFTs- (present on admission)  Assessment & Plan  Likely secondary to EtOH abuse    Traumatic rhabdomyolysis (HCC)  Assessment & Plan  Fall, seizure   thus IV fluids have been given         VTE prophylaxis: SCDs/TEDs    I have performed a physical exam and reviewed and updated ROS and Plan today (6/16/2022). In review of yesterday's note (6/15/2022), there are no changes except as documented above.

## 2022-06-16 NOTE — THERAPY
Occupational Therapy   Initial Evaluation     Patient Name: Franchesca Ortez  Age:  36 y.o., Sex:  female  Medical Record #: [id number]  Today's Date: 6/16/2022     Precautions  Precautions: Fall Risk  Comments: SBP <140    Assessment  Patient is 36 y.o. female admitted for seizure w/fall and another witnessed in ER, found to have R parietal SAH and R parietal scalp hematoma, as well as ETOH withdrawal, tongue laceration, seizure, thrombocytopenia, encephalopathy, hypokalemia, and rhabdomyolysis. PMHx of mood disorder, alcohol dependence, seizure disorder, fibromyalgia, prior suicide attempt. Pt is a questionable historian; unable to recall BR setup, then later mentioned showering while sitting at bottom of tub; RN reports pt pulled cortrak and then attempted to put it in her ear. Reports lives with 2 roommates who she reports work night/day shift, so are able to assist PRN 24/7. However, per chart, roommates are able to provide NO assist. Unclear what is accurate as pt is eager to return home. Pt reports she has an ex- and 2 children (13yo/8yo) but unclear if they are able to assist. Currently limited by decreased functional mobility, activity tolerance, cognition, strength, coordination, balance, and pain which are currently affecting pt's ability to complete ADLs/IADLs at baseline. Will continue to follow.   Recommend SLP cognitive evaluation.     Plan    Recommend Occupational Therapy 4 times per week until therapy goals are met for the following treatments:  Adaptive Equipment, Neuro Re-Education / Balance, Self Care/Activities of Daily Living, Therapeutic Activities, and Therapeutic Exercises.    DC Equipment Recommendations: Unable to determine at this time  Discharge Recommendations: Recommend home health for continued occupational therapy services (as long as has 24/7 SPV/assist. Otherwise recommend Post acute care.)      Objective     06/16/22 1108   Prior Living Situation   Prior Services  "Home-Independent   Housing / Facility 1 Story House   Bathroom Set up Bathtub / Shower Combination   Equipment Owned None   Lives with - Patient's Self Care Capacity Other (Comments)  (2 roommates)   Comments Pt is a questionable historian; unable to recall BR setup, then later mentioned showering while sitting at bottom of tub. Reports lives with 2 roommates who she reports work night/day shift, so are able to assist PRN 24/7. However, per chart, roommates are able to provide NO assist. unclear what is accurate as pt is eager to return home. Pt reports she has an ex  and 2 children (14/8yo) but unclear if they are able to assist.   Prior Level of ADL Function   Self Feeding Independent   Grooming / Hygiene Independent   Bathing Independent   Dressing Independent   Toileting Independent   Prior Level of IADL Function   Medication Management Independent   Laundry Independent   Kitchen Mobility Independent   Finances Independent   Home Management Independent   Shopping Independent   Prior Level Of Mobility Independent Without Device in Home   Driving / Transportation Relatives / Others Provide Transportation   Occupation (Pre-Hospital Vocational) Not Employed  (\"does things around the house\")   Comments unclear if accurate   Precautions   Precautions Fall Risk   Comments post ictal seizure   Vitals   O2 Delivery Device None - Room Air   Pain 0 - 10 Group   Therapist Pain Assessment Post Activity Pain Same as Prior to Activity;During Activity;Nurse Notified  (no c/o pain. but likely tongue pain d/t edema/injury)   Cognition    Cognition / Consciousness X   Speech/ Communication Delayed Responses;Dysarthric   Level of Consciousness Alert   Safety Awareness Impulsive;Impaired   New Learning Impaired   Attention Impaired   Comments pleasant and cooperative; impulsive req mod v/cs and decreased insight into deficits.   Passive ROM Upper Body   Passive ROM Upper Body WDL   Active ROM Upper Body   Active ROM Upper " Body  WDL   Strength Upper Body   Upper Body Strength  X   Gross Strength Generalized Weakness, Equal Bilaterally.    Comments functional for light ADLs   Coordination Upper Body   Coordination X   Comments FM limited BUEs   Balance Assessment   Sitting Balance (Static) Fair   Sitting Balance (Dynamic) Fair -   Standing Balance (Static) Fair -   Standing Balance (Dynamic) Poor +  (improved)   Weight Shift Sitting Fair   Weight Shift Standing Fair   Comments initially req min A for standing balance, but improved to CGA with FWW   Bed Mobility    Supine to Sit Contact Guard Assist   Sit to Supine   (left up in recliner)   Scooting Standby Assist  (at EOB)   Comments HOB elevated   ADL Assessment   Grooming Minimal Assist;Standing  (for balance with oral care and washing hands)   Lower Body Dressing Minimal Assist  (for balance; LOB in sitting while donning)   Toileting Contact Guard Assist  (balance on toilet)   Comments multiple posterior LOBs during ADLs/txfs   Functional Mobility   Sit to Stand Contact Guard Assist   Bed, Chair, Wheelchair Transfer Minimal Assist   Toilet Transfers Minimal Assist  (req max v/cs for safety and x2 LOBs)   Transfer Method Stand Step   Mobility w/FWW and min A: bed>toilet>sink>hallway>chair   Visual Perception   Comments reports no new vision changes   Edema / Skin Assessment   Edema / Skin  Not Assessed   Activity Tolerance   Comments limited by balance and fatigue   Patient / Family Goals   Patient / Family Goal #1 to go home   Short Term Goals   Short Term Goal # 1 LB dressing with SPV and no LOB   Short Term Goal # 2 toilet txf with SPV and no LOB   Short Term Goal # 3 standing g/h with SPV and no LOB   Education Group   Education Provided Role of Occupational Therapist;Transfers;Activities of Daily Living;Pathology of bedrest   Role of Occupational Therapist Patient Response Patient;Acceptance;Explanation;Verbal Demonstration;Reinforcement Needed   Transfers Patient Response  Patient;Acceptance;Explanation;Action Demonstration;Reinforcement Needed   ADL Patient Response Patient;Acceptance;Explanation;Reinforcement Needed;No Learning Evidence   Pathology of Bedrest Patient Response Patient;Acceptance;Explanation;Verbal Demonstration;Reinforcement Needed   Problem List   Problem List Decreased Active Daily Living Skills;Decreased Homemaking Skills;Decreased Upper Extremity Strength Right;Decreased Upper Extremity Strength Left;Decreased Functional Mobility;Decreased Activity Tolerance;Safety Awareness Deficits / Cognition;Impaired Coordination Right Upper Extremity;Impaired Coordination Left Upper Extremity;Impaired Cognitive Function;Impaired Postural Control / Balance

## 2022-06-16 NOTE — PROGRESS NOTES
Patient removed cortrek awaiting xray verification. Patient stated she was leaving. Patient got out of chair, unsteady on her feet and needed physical touch by RN staff to prevent from falling. Patient became agitated and stated she needed to leave. Rapid RN was at bedside to replace cortrek. And assisted with attempt to redirect patient. Patient got herself dressed.   Education was provided on importance of staying because of lacerations to tongue, SAH, and unstable gait while ambulating.  MD notified and came to bed to speak with patient. Patient called her father who is to arrive to facility as soon as he is able, reported about 30 min.   Awaiting arrival of father to unit at this time.

## 2022-06-16 NOTE — DIETARY
Nutrition Services: Day 3 of admit.  Franchesca Ortez is a 36 y.o. female with admitting DX of Seizure (HCC)  RD has been following for TF to reach goal rate (Fibersource HN @ 60 ml/hr). TF was @ 40 ml/hr this morning before pt pulled the Cortrak out. PO diet has now been started per SLP recommendations. Refeeding labs being monitored; K+ 3.5 today, Pt coninues to receive vitamin and mineral supplementation and necessary electrolyte replacemnt per MAR. Weight down 2.7 kg from 6/14 am to 6/15 pm. RD will informally monitor pt's PO intake over next 4 days and advise interventions as needed. Please recorded all PO intake in ADLs for RD to review.

## 2022-06-16 NOTE — PROGRESS NOTES
"This RN walked into patient room to administer meds, cortrek was sitting on lap. Patient stated \"I just needed to remove it for a minute, I will put it back\" education provided on medical lines (ie feeding tubes and IVs) need to be left in place. Patient stated \"I will put it back\" and then grabs cortrek and begins to attempt to place it in her ear. cortrek removed from patient arm reach and placed in garbage.   "

## 2022-06-17 VITALS
TEMPERATURE: 98.2 F | OXYGEN SATURATION: 97 % | SYSTOLIC BLOOD PRESSURE: 141 MMHG | RESPIRATION RATE: 16 BRPM | HEART RATE: 97 BPM | BODY MASS INDEX: 22.99 KG/M2 | WEIGHT: 138.01 LBS | DIASTOLIC BLOOD PRESSURE: 80 MMHG | HEIGHT: 65 IN

## 2022-06-17 PROCEDURE — A9270 NON-COVERED ITEM OR SERVICE: HCPCS | Performed by: INTERNAL MEDICINE

## 2022-06-17 PROCEDURE — 99239 HOSP IP/OBS DSCHRG MGMT >30: CPT | Performed by: HOSPITALIST

## 2022-06-17 PROCEDURE — 700102 HCHG RX REV CODE 250 W/ 637 OVERRIDE(OP): Performed by: INTERNAL MEDICINE

## 2022-06-17 RX ORDER — CHOLECALCIFEROL (VITAMIN D3) 125 MCG
5 CAPSULE ORAL NIGHTLY
Status: DISCONTINUED | OUTPATIENT
Start: 2022-06-17 | End: 2022-06-17 | Stop reason: HOSPADM

## 2022-06-17 RX ADMIN — Medication 5 MG: at 00:48

## 2022-06-17 NOTE — PROGRESS NOTES
Received pt restless / agitated. AO x 4. Roaming around the hallway looking for cigarettes. Unsteady on her feet. Education provided regarding fall precaution. Charge RN at bedside . Patient more agitated and wants to leave AMA. Pulled out her IV. Dr. Danis Colin made aware.

## 2022-06-17 NOTE — PROGRESS NOTES
00:30 Patient agitated again . Shouting and yelling in the hallway. Verbalized that she is in the hotel and not in the hospital. Trying to leave AMA.   Reoriented pt and assisted back to her room. Patient then called father to be picked up. Then patient stated that her father will come at 10:00 am.     Patient refusing vital signs and Morning labs.   Dr. Calabrese made aware.

## 2022-06-17 NOTE — DISCHARGE INSTRUCTIONS
Discharge Instructions per Daniel Hsu M.D.    No alcohol and fall precautions as discussed.    DIET: heart-healthy    ACTIVITY: slow taper up of activities    DIAGNOSIS: subarachnoid hemorrhage    Return to ER if symptoms worsen          Discharge Instructions    Discharged to home by car with relative. Discharged via wheelchair, hospital escort: Yes.  Special equipment needed: Not Applicable    Be sure to schedule a follow-up appointment with your primary care doctor or any specialists as instructed.     Discharge Plan:   Diet Plan: Discussed  Activity Level: Discussed  Confirmed Follow up Appointment: Appointment Scheduled  Confirmed Symptoms Management: Discussed  Medication Reconciliation Updated: Yes    I understand that a diet low in cholesterol, fat, and sodium is recommended for good health. Unless I have been given specific instructions below for another diet, I accept this instruction as my diet prescription.   Other diet: Cardiac      Special Instructions: None    Is patient discharged on Warfarin / Coumadin?   No     Depression / Suicide Risk    As you are discharged from this [facility] facility, it is important to learn how to keep safe from harming yourself.    Recognize the warning signs:  Abrupt changes in personality, positive or negative- including increase in energy   Giving away possessions  Change in eating patterns- significant weight changes-  positive or negative  Change in sleeping patterns- unable to sleep or sleeping all the time   Unwillingness or inability to communicate  Depression  Unusual sadness, discouragement and loneliness  Talk of wanting to die  Neglect of personal appearance   Rebelliousness- reckless behavior  Withdrawal from people/activities they love  Confusion- inability to concentrate     If you or a loved one observes any of these behaviors or has concerns about self-harm, here's what you can do:  Talk about it- your feelings and reasons for harming  yourself  Remove any means that you might use to hurt yourself (examples: pills, rope, extension cords, firearm)  Get professional help from the community (Mental Health, Substance Abuse, psychological counseling)  Do not be alone:Call your Safe Contact- someone whom you trust who will be there for you.  Call your local CRISIS HOTLINE 285-2270 or 125-626-3235  Call your local Children's Mobile Crisis Response Team [place] (502) 754-5550 or www.Droid system master  Call the toll free National Suicide Prevention Hotlines   National Suicide Prevention Lifeline 915-765-UGBW (7808)  National Hope Line Network 800-SUICIDE (209-7943)

## 2022-06-17 NOTE — CARE PLAN
The patient is {Patient Stability:[id number]}    Shift Goals  Clinical Goals: Monitor seizure activity/ fall pevention  Patient Goals: Go home  Family Goals: ELBA    Progress made toward(s) clinical / shift goals:  ***    Patient is not progressing towards the following goals:      Problem: Safety - Medical Restraint  Goal: Remains free of injury from restraints (Restraint for Interference with Medical Device)  Outcome: Not Progressing

## 2022-06-17 NOTE — CARE PLAN
The patient is Stable - Low risk of patient condition declining or worsening    Shift Goals  Clinical Goals: Monitor seizure activity/ fall pevention  Patient Goals: Go home  Family Goals: ELBA    Progress made toward(s) clinical / shift goals:      Problem: Pain - Standard  Goal: Alleviation of pain or a reduction in pain to the patient’s comfort goal  Outcome: Progressing     Patient is not progressing towards the following goals:    Problem: Fall Risk  Goal: Patient will remain free from falls  Outcome: Not Progressing

## 2022-06-17 NOTE — DISCHARGE SUMMARY
Discharge Summary    CHIEF COMPLAINT ON ADMISSION  Chief Complaint   Patient presents with   • Seizure       Reason for Admission  ems     Admission Date  6/13/2022    CODE STATUS  Full Code    HPI & HOSPITAL COURSE  This is a 36 y.o. female here with seizure   Ms. Ortez is a 36 y.o. female with history of mood disorder, alcohol dependence, seizure disorder, fibromyalgia, prior suicide attempt who presented 6/13/2022 with evaluation for seizure.  History limited as patient appears somnolent after receiving Ativan and also likely in postictal confusion state.  Per ER staff, patient had another seizure episode in the ER which was resolved with 2 mg IV Ativan administration.  CT head noted right parietal SAH, right parietal scalp hematoma, no calvarial fracture.  Trauma service, neurology services were consulted.  ICU was also consulted --recommended IMCU admission.  She was admitted to medicine service IMCU.  Due to severe alcohol withdrawal, she was placed on IV Precedex drip.  Due to the marked swelling of her tongue, a cortrak was placed for medications and feeding.  The tube feeds were increased slowly due to the risk of refeeding syndrome given her significant alcohol abuse.  She had two 4-hour chlorhexidine scrubs to her tongue.  On 6/15/2022 the Precedex drip was turned off and she was maintained on intermittent Ativan.  On 6/16/2022 she was able to swallow a full liquid diet safely as was assessed by speech-language pathology.  Her detox improved substantially though she was not quite ready discharge as she was unstable on her feet.  I called her father who came to bedside and we had a long discussion with Franchesca.  He is quite hopeful she can get into an inpatient detox program.   has met with her and provided information on detox facilities that she can apply for on a voluntary basis.  Today she is alert and oriented x4 and her steadiness has improved though certainly balance is not back to  baseline.  She is quite adamant that she wants to go home today and she is not on a legal hold.  We discussed fall precautions as she recently had a traumatic subarachnoid hemorrhage.  A friend will pick her up her father will check on her today.  Alcohol and smoking cessation were discussed at length.  She is to refrain from NSAIDs and aspirin. Of note is that she does not have a drivers license.     Therefore, she is discharged in fair and stable condition to home with close outpatient follow-up.    The patient met 2-midnight criteria for an inpatient stay at the time of discharge.    Discharge Date  6/17    FOLLOW UP ITEMS POST DISCHARGE  [facility]    DISCHARGE DIAGNOSES  Principal Problem:    SAH (subarachnoid hemorrhage) (HCC) POA: Yes  Active Problems:    Alcohol withdrawal (HCC) POA: Yes    Seizure (HCC) POA: Yes    Tongue laceration POA: Yes    Elevated LFTs POA: Yes    Hypokalemia POA: Yes    Acute encephalopathy POA: Yes    Agitation POA: Unknown    Thyroid nodule POA: Yes    Abnormal platelet function test (HCC) POA: Yes    Trauma POA: Yes    Anemia POA: Yes    Thrombocytopenia (HCC) POA: Yes    Tobacco dependence POA: Yes    Traumatic rhabdomyolysis (HCC) POA: Unknown  Resolved Problems:    Hyperglycemia POA: Unknown      FOLLOW UP  No future appointments.  [facility] NEUROLOGY  [address]  [address]  [facility] [address]  [phone]            MEDICATIONS ON DISCHARGE     Medication List      You have not been prescribed any medications.         Allergies  No Known Allergies    DIET  Orders Placed This Encounter   Procedures   • Diet: Diet Tube Feed; Formula: Fibersource HN; Goal Rate (mL/Hour): 60; Duration: 24 HR     Start at 20mL/hr; advance by 20 ml q24hrs to goal rate.     Standing Status:   Standing     Number of Occurrences:   1     Order Specific Question:   Diet     Answer:   Diet Tube Feed [35]     Order Specific Question:   Formula:     Answer:   Fibersource HN      Order Specific Question:   Goal Rate (mL/Hour)     Answer:   60     Order Specific Question:   Duration     Answer:   24 HR   • Diet Order Diet: Full Liquid     Standing Status:   Standing     Number of Occurrences:   1     Order Specific Question:   Diet:     Answer:   Full Liquid [11]       ACTIVITY  Slow taper up of activities    CONSULTATIONS  Trauma surgery  Neurology  Critical care  physiatry  PROCEDURES  none    LABORATORY  Lab Results   Component Value Date    SODIUM 130 (L) 06/16/2022    POTASSIUM 3.5 (L) 06/16/2022    CHLORIDE 97 06/16/2022    CO2 18 (L) 06/16/2022    GLUCOSE 102 (H) 06/16/2022    BUN 10 06/16/2022    CREATININE 0.44 (L) 06/16/2022    hepatitis panel was negative  Pregnancy negative    Lab Results   Component Value Date    WBC 8.8 06/16/2022    HEMOGLOBIN 10.3 (L) 06/16/2022    HEMATOCRIT 30.1 (L) 06/16/2022    PLATELETCT 173 06/16/2022      Imaging studies:  DX-ABDOMEN FOR TUBE PLACEMENT   Final Result      Feeding tube tip projects in the first portion of the duodenum.      EC-ECHOCARDIOGRAM COMPLETE W/O CONT   Final Result      CT-HEAD W/O   Final Result      1. Improvement of the areas of right parietal subarachnoid hemorrhage, with subarachnoid blood located in the parasagittal right occipital subarachnoid space.   2. No new hemorrhage in the interval.   3. Chronic right-sided sinus disease.   4. Significant improvement in right parietal scalp hematoma.         DX-ABDOMEN FOR TUBE PLACEMENT   Final Result      The tip of the feeding tube terminates over the antrum of the stomach.      CT-CSPINE WITHOUT PLUS RECONS   Final Result      1.  No acute fracture is identified.      2.  Right frontal, ethmoid, and maxillary sinus disease.      3.  1.2 cm hypodense right thyroid nodule      CT-HEAD W/O   Final Result      1. Right parietal subarachnoid hemorrhage in two cortical sulci.   2. Right parietal scalp hematoma.   3. No calvarial fracture.   4. Chronic right-sided paranasal sinus  disease.      Based solely on CT findings, the brain injury guideline category is mBIG 1.      SDH < 4mm   IPH < 4mm   SAH < 3 sulci and < 1mm      The original BIG retrospective analysis found radiographic progression in 0% of BIG 1 patients and 2.6% BIG 2.      I, Dr. Golden Weiss, discussed the results of this examination directly by phone with Dr. Evelin Oconnor on 6/13/2022 at 1637 hours.      DX-CHEST-PORTABLE (1 VIEW)   Final Result      No acute cardiac or pulmonary abnormalities are identified.            Total time of the discharge process exceeds 32 minutes.

## 2022-06-19 ENCOUNTER — HOSPITAL ENCOUNTER (EMERGENCY)
Facility: MEDICAL CENTER | Age: 37
End: 2022-06-19
Attending: EMERGENCY MEDICINE

## 2022-06-19 VITALS
BODY MASS INDEX: 20.69 KG/M2 | RESPIRATION RATE: 16 BRPM | OXYGEN SATURATION: 96 % | HEART RATE: 95 BPM | WEIGHT: 124.34 LBS | SYSTOLIC BLOOD PRESSURE: 121 MMHG | DIASTOLIC BLOOD PRESSURE: 79 MMHG | TEMPERATURE: 96.6 F

## 2022-06-19 DIAGNOSIS — J02.9 SORE THROAT: ICD-10-CM

## 2022-06-19 DIAGNOSIS — S09.93XA INJURY OF TONGUE, INITIAL ENCOUNTER: ICD-10-CM

## 2022-06-19 LAB — S PYO DNA SPEC NAA+PROBE: NOT DETECTED

## 2022-06-19 PROCEDURE — 99283 EMERGENCY DEPT VISIT LOW MDM: CPT

## 2022-06-19 PROCEDURE — 87651 STREP A DNA AMP PROBE: CPT

## 2022-06-19 RX ORDER — CHLORHEXIDINE GLUCONATE ORAL RINSE 1.2 MG/ML
15 SOLUTION DENTAL 2 TIMES DAILY
Qty: 473 ML | Refills: 0 | Status: SHIPPED | OUTPATIENT
Start: 2022-06-19 | End: 2022-07-05

## 2022-06-19 ASSESSMENT — LIFESTYLE VARIABLES: DO YOU DRINK ALCOHOL: NO

## 2022-06-19 ASSESSMENT — FIBROSIS 4 INDEX: FIB4 SCORE: 2.73

## 2022-06-19 NOTE — DISCHARGE INSTRUCTIONS
Please contact Dr. Brennan's clinic at the number listed above.  Call tomorrow morning to schedule follow-up appointment for complete recheck.  Let them know that you had a tongue injury during a seizure, and need a close follow-up appointment for wound recheck.  Return to the emergency department if you develop any new or worsening symptoms including worsening pain, worsening swelling, drainage, fevers, or if you have any further concerns.  You may continue to take ibuprofen and Tylenol as needed for pain.

## 2022-06-19 NOTE — ED PROVIDER NOTES
ED Physician Note    Chief Concern:   Tongue swelling    HPI:  Franchesca Ortez is a very pleasant 37-year-old woman who presents to the emergency department for evaluation of tongue swelling.  She was seen in this emergency department last week after she had an alcohol withdrawal seizure at home biting her tongue, followed by a second alcohol withdrawal seizure in the emergency department where she reinjured her tongue.  At that time, she did have a tongue laceration, this was discussed with Dr. Brennan, plastic surgeon on-call for facial trauma, who recommended against repair given lack of benefit with regard to outcome.  She still has some tongue swelling, was recently discharged from the hospital after she insisted on leaving.  However she has had persistent pain, and she is concerned about the appearance of her tongue.  She did not wait for full discharge instructions including follow-up instructions with Dr. Brennan.    She also reports a persistent sore throat which was present prior to her seizure.  She reports her tongue is painful, the pain is not significantly worsened since time of admission.  He has been taking ibuprofen and Tylenol without significant improvement.    Review of Systems:  See HPI for pertinent positives and negatives.    Past Medical History:   has a past medical history of Alcohol abuse, Depression, and History of suicide attempt.    Social History:  Social History     Tobacco Use   • Smoking status: Current Every Day Smoker     Packs/day: 0.50     Years: 15.00     Pack years: 7.50     Types: Cigarettes   • Smokeless tobacco: Never Used   • Tobacco comment: 3/4 pk a day   Substance and Sexual Activity   • Alcohol use: Yes   • Drug use: No   • Sexual activity: Yes     Partners: Male     Birth control/protection: I.U.D.       Surgical History:   has a past surgical history that includes gyn surgery and primary c section.    Current Medications:  Home Medications     Reviewed by Nay PORTER  DALE Feliz (Registered Nurse) on 06/19/22 at 0947  Med List Status: Partial   Medication Last Dose Status        Patient Mauricio Taking any Medications                       Allergies:  No Known Allergies    Physical Exam:  Vital Signs: /78   Pulse (!) 105   Temp 37 °C (98.6 °F) (Temporal)   Resp 17   Wt 56.4 kg (124 lb 5.4 oz)   SpO2 98%   BMI 20.69 kg/m²   Constitutional: Alert, no acute distress  HENT: Posterior pharynx is mildly inflamed.  The dorsal aspect of the tongue is atraumatic, ventral aspect of the tongue has large area of granulation tissue consistent with wound healing.  No purulent drainage noted.  Eyes: Pupils equal and reactive, no periorbital edema  Neck: Supple, normal range of motion, no stridor  Cardiovascular: Extremities are warm and well perfused  Pulmonary: No respiratory distress, normal work of breathing, no accessory muscule usage, breath sounds clear and equal bilaterally  Musculoskeletal: Normal range of motion in all extremities, no swelling or deformity noted  Neurologic: Alert, oriented, no slurred speech, no clinical signs of intoxication, no abnormal movements or seizure-like activity  Psychiatric: Normal and appropriate mood and affect    Medical records reviewed for continuity of care.  Discharge summary reviewed from 6/17/2022.  She was admitted for alcohol withdrawal seizures, treated in the IMCU with IV Precedex.  Due to marked tongue swelling a core track was placed for medication and feeding.  Tongue was treated with chlorhexidine scrubs.  Plan was for discharge into an inpatient detox facility.  At time of discharge she was awake and alert, steadiness had improved though balance was not back to baseline.  She was adamant that she wanted to be discharged, was not on a legal hold.  She was discharged home in fair and stable condition with plans for close outpatient follow-up.    Labs:  Labs Reviewed   GROUP A STREP BY PCR     Differential diagnosis:  Strep  pharyngitis, viral pharyngitis, wound healing, infected wound    MDM:  Ms. Ortez presents to the emergency department today for evaluation of tongue pain and swelling in the setting of injury due to seizure last week.  Additionally she has a sore throat.  Posterior pharynx is normal-appearing, no asymmetry, normal uvula, no evidence of peritonsillar abscess.  She has full painless range of motion of the neck without evidence of retropharyngeal abscess.  Tongue does have a large area of granulation tissue, this appears consistent with normal wound healing.  No purulent drainage, she is afebrile, no associated facial swelling, less concerning for significant infection.    Strep pharyngitis PCR was negative.    Plan at this time is for discharge home with close outpatient follow-up with plastic surgery.  I have prescribed Peridex and counseled her to rinse her mouth twice daily.  She may continue to brush her teeth as usual.  She is referred to Dr. Brennan's clinic for wound recheck, counseled to call tomorrow morning to schedule an urgency department follow-up visit. Return precautions were discussed with the patient, and provided in written form with the patient's discharge instructions.     Personal protective equipment including N95 surgical respirator, goggles, and gloves were used during this encounter.       Disposition:  Discharge home in stable condition    Final Impression:  1. Injury of tongue, initial encounter    2. Sore throat        Electronically signed by: Evelin Freed MD, 6/19/2022 12:09 PM

## 2022-06-19 NOTE — ED TRIAGE NOTES
Chief Complaint   Patient presents with   • Oral Swelling     Pt reports that she bit her tongue after having a seizure. Was discharged 2 days ago and pain swelling have gotten worse. Grey tissue noted to bottom of tongue.       /78   Pulse (!) 105   Temp 37 °C (98.6 °F) (Temporal)   Resp 17   Wt 56.4 kg (124 lb 5.4 oz)   SpO2 98%   Pt informed of wait times. Educated on triage process.  Asked to return to triage RN for any new or worsening of symptoms. Thanked for patience.

## 2022-10-15 ENCOUNTER — APPOINTMENT (OUTPATIENT)
Dept: RADIOLOGY | Facility: MEDICAL CENTER | Age: 37
End: 2022-10-15
Attending: EMERGENCY MEDICINE

## 2022-10-15 ENCOUNTER — HOSPITAL ENCOUNTER (EMERGENCY)
Facility: MEDICAL CENTER | Age: 37
End: 2022-10-15
Attending: EMERGENCY MEDICINE

## 2022-10-15 VITALS
WEIGHT: 128.53 LBS | HEART RATE: 75 BPM | DIASTOLIC BLOOD PRESSURE: 68 MMHG | TEMPERATURE: 98 F | OXYGEN SATURATION: 97 % | RESPIRATION RATE: 18 BRPM | BODY MASS INDEX: 21.94 KG/M2 | HEIGHT: 64 IN | SYSTOLIC BLOOD PRESSURE: 110 MMHG

## 2022-10-15 DIAGNOSIS — F10.29 ALCOHOL DEPENDENCE WITH UNSPECIFIED ALCOHOL-INDUCED DISORDER (HCC): ICD-10-CM

## 2022-10-15 DIAGNOSIS — R56.9 SEIZURE-LIKE ACTIVITY (HCC): ICD-10-CM

## 2022-10-15 LAB
ALBUMIN SERPL BCP-MCNC: 4.8 G/DL (ref 3.2–4.9)
ALBUMIN/GLOB SERPL: 1.3 G/DL
ALP SERPL-CCNC: 103 U/L (ref 30–99)
ALT SERPL-CCNC: 128 U/L (ref 2–50)
ANION GAP SERPL CALC-SCNC: 16 MMOL/L (ref 7–16)
AST SERPL-CCNC: 103 U/L (ref 12–45)
BASOPHILS # BLD AUTO: 0.8 % (ref 0–1.8)
BASOPHILS # BLD: 0.05 K/UL (ref 0–0.12)
BILIRUB SERPL-MCNC: 0.9 MG/DL (ref 0.1–1.5)
BUN SERPL-MCNC: 6 MG/DL (ref 8–22)
CALCIUM SERPL-MCNC: 9.5 MG/DL (ref 8.5–10.5)
CHLORIDE SERPL-SCNC: 100 MMOL/L (ref 96–112)
CO2 SERPL-SCNC: 21 MMOL/L (ref 20–33)
CREAT SERPL-MCNC: 0.57 MG/DL (ref 0.5–1.4)
EOSINOPHIL # BLD AUTO: 0.13 K/UL (ref 0–0.51)
EOSINOPHIL NFR BLD: 2 % (ref 0–6.9)
ERYTHROCYTE [DISTWIDTH] IN BLOOD BY AUTOMATED COUNT: 45.1 FL (ref 35.9–50)
ETHANOL BLD-MCNC: 115.5 MG/DL
GFR SERPLBLD CREATININE-BSD FMLA CKD-EPI: 120 ML/MIN/1.73 M 2
GLOBULIN SER CALC-MCNC: 3.7 G/DL (ref 1.9–3.5)
GLUCOSE SERPL-MCNC: 76 MG/DL (ref 65–99)
HCG SERPL QL: NEGATIVE
HCT VFR BLD AUTO: 40.6 % (ref 37–47)
HGB BLD-MCNC: 14.1 G/DL (ref 12–16)
IMM GRANULOCYTES # BLD AUTO: 0.04 K/UL (ref 0–0.11)
IMM GRANULOCYTES NFR BLD AUTO: 0.6 % (ref 0–0.9)
LYMPHOCYTES # BLD AUTO: 1.65 K/UL (ref 1–4.8)
LYMPHOCYTES NFR BLD: 25.3 % (ref 22–41)
MAGNESIUM SERPL-MCNC: 2.1 MG/DL (ref 1.5–2.5)
MCH RBC QN AUTO: 34.8 PG (ref 27–33)
MCHC RBC AUTO-ENTMCNC: 34.7 G/DL (ref 33.6–35)
MCV RBC AUTO: 100.2 FL (ref 81.4–97.8)
MONOCYTES # BLD AUTO: 0.75 K/UL (ref 0–0.85)
MONOCYTES NFR BLD AUTO: 11.5 % (ref 0–13.4)
NEUTROPHILS # BLD AUTO: 3.89 K/UL (ref 2–7.15)
NEUTROPHILS NFR BLD: 59.8 % (ref 44–72)
NRBC # BLD AUTO: 0 K/UL
NRBC BLD-RTO: 0 /100 WBC
PHOSPHATE SERPL-MCNC: 4.5 MG/DL (ref 2.5–4.5)
PLATELET # BLD AUTO: 165 K/UL (ref 164–446)
PMV BLD AUTO: 9.6 FL (ref 9–12.9)
POTASSIUM SERPL-SCNC: 3.6 MMOL/L (ref 3.6–5.5)
PROT SERPL-MCNC: 8.5 G/DL (ref 6–8.2)
RBC # BLD AUTO: 4.05 M/UL (ref 4.2–5.4)
SODIUM SERPL-SCNC: 137 MMOL/L (ref 135–145)
WBC # BLD AUTO: 6.5 K/UL (ref 4.8–10.8)

## 2022-10-15 PROCEDURE — 82077 ASSAY SPEC XCP UR&BREATH IA: CPT

## 2022-10-15 PROCEDURE — 80053 COMPREHEN METABOLIC PANEL: CPT

## 2022-10-15 PROCEDURE — 85025 COMPLETE CBC W/AUTO DIFF WBC: CPT

## 2022-10-15 PROCEDURE — 84703 CHORIONIC GONADOTROPIN ASSAY: CPT

## 2022-10-15 PROCEDURE — 70450 CT HEAD/BRAIN W/O DYE: CPT

## 2022-10-15 PROCEDURE — 36415 COLL VENOUS BLD VENIPUNCTURE: CPT

## 2022-10-15 PROCEDURE — 83735 ASSAY OF MAGNESIUM: CPT

## 2022-10-15 PROCEDURE — 99284 EMERGENCY DEPT VISIT MOD MDM: CPT

## 2022-10-15 PROCEDURE — 84100 ASSAY OF PHOSPHORUS: CPT

## 2022-10-15 PROCEDURE — 82962 GLUCOSE BLOOD TEST: CPT

## 2022-10-15 ASSESSMENT — FIBROSIS 4 INDEX: FIB4 SCORE: 2.73

## 2022-10-15 NOTE — ED PROVIDER NOTES
ED Provider Note    CHIEF COMPLAINT  Chief Complaint   Patient presents with    Seizure    Head Injury     Fell couple times hit head , hematoma front head , periorbital right eye bruising       HPI  Franchesca Nancy Ortez is a 37 y.o. female who presents to the emergency department who presents with seizure-like activity and hedger.  She states she is fell a couple times in the last couple days and thinks she might had a seizure.  She does have a history of withdrawal-like seizure approximately 4 months ago.  Since that time, she states that she is falling frequently.  She does believe she had a witnessed clonic tonic seizure.  She does endorse the fact of falling and waking up on the ground but not sure she had seizure.  She has not followed up with them in the past for this.  The patient has fever, shakes, chills, sweats, nausea, vomiting, loss of sensation or strength arms or legs.  She does drink alcohol daily and states she drank alcohol this morning.    REVIEW OF SYSTEMS  Positives as above. Pertinent negatives include fever, shakes, chills, sweats, nausea, vomiting, neck pain, back pain, Collins pain, medication, melena, drug use.  All other 10 review of systems are negative    PAST MEDICAL HISTORY  Past Medical History:   Diagnosis Date    Alcohol abuse     Depression     History of suicide attempt        FAMILY HISTORY  Noncontributory    SOCIAL HISTORY  Social History     Socioeconomic History    Marital status:    Tobacco Use    Smoking status: Every Day     Packs/day: 0.50     Years: 15.00     Pack years: 7.50     Types: Cigarettes    Smokeless tobacco: Never    Tobacco comments:     3/4 pk a day   Substance and Sexual Activity    Alcohol use: Yes    Drug use: No    Sexual activity: Yes     Partners: Male     Birth control/protection: I.U.D.       SURGICAL HISTORY  Past Surgical History:   Procedure Laterality Date    GYN SURGERY      c section    PRIMARY C SECTION         CURRENT MEDICATIONS  Home  "Medications    **Home medications have not yet been reviewed for this encounter**         ALLERGIES  No Known Allergies    PHYSICAL EXAM  VITAL SIGNS: /68   Pulse 75   Temp 36.7 °C (98 °F)   Resp 18   Ht 1.626 m (5' 4\")   Wt 58.3 kg (128 lb 8.5 oz)   SpO2 97%   BMI 22.06 kg/m²      Constitutional: Well developed, Well nourished, No acute distress, Non-toxic appearance.   Eyes: PERRLA, EOMI, Conjunctiva normal, No discharge.   HENT: Slight right periorbital ecchymosis, no facial tenderness, no tongue laceration  Neck: No cervical spine tenderness or step-off deformity  Cardiovascular: Normal heart rate, Normal rhythm, No murmurs, No rubs, No gallops, and intact distal pulses.   Thorax & Lungs:  No respiratory distress, no rales, no rhonchi, No wheezing, No chest wall tenderness.   Abdomen: Bowel sounds normal, Soft, No tenderness, No guarding, No rebound, No pulsatile masses.   Skin: Warm, slight periorbital ecchymosis, slight ecchymosis to bilateral dorsum hands dry, No erythema, No rash.   Extremities: Full range of motion, no deformity, no edema.  Neurologic: Alert & oriented x 3, No focal deficits noted, acting appropriately on exam.  Psychiatric: Affect normal for clinical presentation.      LABORATORY/ECG  Results for orders placed or performed during the hospital encounter of 10/15/22   CBC WITH DIFFERENTIAL   Result Value Ref Range    WBC 6.5 4.8 - 10.8 K/uL    RBC 4.05 (L) 4.20 - 5.40 M/uL    Hemoglobin 14.1 12.0 - 16.0 g/dL    Hematocrit 40.6 37.0 - 47.0 %    .2 (H) 81.4 - 97.8 fL    MCH 34.8 (H) 27.0 - 33.0 pg    MCHC 34.7 33.6 - 35.0 g/dL    RDW 45.1 35.9 - 50.0 fL    Platelet Count 165 164 - 446 K/uL    MPV 9.6 9.0 - 12.9 fL    Neutrophils-Polys 59.80 44.00 - 72.00 %    Lymphocytes 25.30 22.00 - 41.00 %    Monocytes 11.50 0.00 - 13.40 %    Eosinophils 2.00 0.00 - 6.90 %    Basophils 0.80 0.00 - 1.80 %    Immature Granulocytes 0.60 0.00 - 0.90 %    Nucleated RBC 0.00 /100 WBC    " Neutrophils (Absolute) 3.89 2.00 - 7.15 K/uL    Lymphs (Absolute) 1.65 1.00 - 4.80 K/uL    Monos (Absolute) 0.75 0.00 - 0.85 K/uL    Eos (Absolute) 0.13 0.00 - 0.51 K/uL    Baso (Absolute) 0.05 0.00 - 0.12 K/uL    Immature Granulocytes (abs) 0.04 0.00 - 0.11 K/uL    NRBC (Absolute) 0.00 K/uL   COMP METABOLIC PANEL   Result Value Ref Range    Sodium 137 135 - 145 mmol/L    Potassium 3.6 3.6 - 5.5 mmol/L    Chloride 100 96 - 112 mmol/L    Co2 21 20 - 33 mmol/L    Anion Gap 16.0 7.0 - 16.0    Glucose 76 65 - 99 mg/dL    Bun 6 (L) 8 - 22 mg/dL    Creatinine 0.57 0.50 - 1.40 mg/dL    Calcium 9.5 8.5 - 10.5 mg/dL    AST(SGOT) 103 (H) 12 - 45 U/L    ALT(SGPT) 128 (H) 2 - 50 U/L    Alkaline Phosphatase 103 (H) 30 - 99 U/L    Total Bilirubin 0.9 0.1 - 1.5 mg/dL    Albumin 4.8 3.2 - 4.9 g/dL    Total Protein 8.5 (H) 6.0 - 8.2 g/dL    Globulin 3.7 (H) 1.9 - 3.5 g/dL    A-G Ratio 1.3 g/dL   MAGNESIUM   Result Value Ref Range    Magnesium 2.1 1.5 - 2.5 mg/dL   PHOSPHORUS   Result Value Ref Range    Phosphorus 4.5 2.5 - 4.5 mg/dL   BETA-HCG QUALITATIVE SERUM   Result Value Ref Range    Beta-Hcg Qualitative Serum Negative Negative   DIAGNOSTIC ALCOHOL   Result Value Ref Range    Diagnostic Alcohol 115.5 (H) <10.1 mg/dL   ESTIMATED GFR   Result Value Ref Range    GFR (CKD-EPI) 120 >60 mL/min/1.73 m 2       RADIOLOGY/PROCEDURES  CT-HEAD W/O   Final Result      No acute intracranial abnormality.                      COURSE & MEDICAL DECISION MAKING  Pertinent Labs & Imaging studies reviewed. (See chart for details)  This is a pleasant 37-year-old female presents with possible seizure-like activity.  The patient has no seizure-like activity here in the emergency department has no seizure today.  The patient does drink alcohol and she Sligh intoxicated and now maybe is having withdrawal-like seizures.  The patient had a CT scan of the brain was negative for acute intracranial hemorrhage, significant normality.  She does have  leukocytosis and do not believe she has encephalitis or significant infection.  In addition, she has no significant electrolyte abnormality.  She was observed for several hours and she has no seizure-like activity no focal neurological vascular deficits.  I do not believe she requires antiepileptic medications for withdrawal-like seizures.  I have asked her to follow-up with neurology and primary care physician for further evaluation and management.  I did fill out the DMV form for driving.    FINAL IMPRESSION     1. Seizure-like activity (HCC) Active   2. Alcohol dependence with unspecified alcohol-induced disorder (HCC) Active       DISPOSITION:  Patient will be discharged home in stable condition.    FOLLOW UP:  [facility], Emergency Dept  [address] [facility] [address]  [phone]    If symptoms worsen    [facility]  [address] [place] [id number] [phone]  Schedule an appointment as soon as possible for a visit       [address] [address]  [phone]          Electronically signed by: Bill Maddox D.O., 10/15/2022 1:43 PM

## 2022-10-15 NOTE — ED TRIAGE NOTES
Chief Complaint   Patient presents with    Seizure    Head Injury     Fell couple times hit head , hematoma front head , periorbital right eye bruising     Pt ambulated to triage with above complaints. Pt was last admitted for seizure in 6/2022 and had ct showed right parietal subarachnoid hemorrhage. Pt did not follow up when she left hospital. She started having seizures 2days ago and fallen hitting head . Pt also admits slowing down on drinking alcohol.  Fsbs 107

## 2022-10-15 NOTE — ED NOTES
Assist RN: Patient provided discharge instructions. Patient verbalized understanding. Patient leaving ER in stable condition. Patient ambulatory with steady gait. Wristband and IV removed.

## 2022-10-17 LAB — GLUCOSE BLD STRIP.AUTO-MCNC: 107 MG/DL (ref 65–99)

## 2022-11-23 ENCOUNTER — TELEPHONE (OUTPATIENT)
Dept: NEUROLOGY | Facility: MEDICAL CENTER | Age: 37
End: 2022-11-23

## 2022-11-23 NOTE — TELEPHONE ENCOUNTER
New Patient     [facility] Patient Demographics? Yes    Is visit type and length correct?  Yes    Is referral attached to visit? Yes    Were records received from referring provider? Yes    Patient was not contacted to have someone accompany them to visit.    Is this appointment scheduled as a Hospital Follow-Up?  No    Does the patient require any pre procedure or post procedure follow up? No    If any orders were placed at last visit or intended to be done for this visit do we have Results/Consult Notes? No  Labs - Labs were not ordered at last office visit.  Note: If patient appointment is for lab review and patient did not complete labs, check with provider if OK to reschedule patient until labs completed.  Imaging - Imaging was not ordered at last office visit.  Referrals - Referral ordered, patient has NOT been seen.        10.  If patient appointment is for Botox - is order pended for provider? No.

## 2022-12-01 ENCOUNTER — OFFICE VISIT (OUTPATIENT)
Dept: NEUROLOGY | Facility: MEDICAL CENTER | Age: 37
End: 2022-12-01
Attending: PSYCHIATRY & NEUROLOGY
Payer: MEDICAID

## 2022-12-01 VITALS
DIASTOLIC BLOOD PRESSURE: 92 MMHG | BODY MASS INDEX: 24.24 KG/M2 | WEIGHT: 145.5 LBS | HEIGHT: 65 IN | OXYGEN SATURATION: 98 % | SYSTOLIC BLOOD PRESSURE: 124 MMHG | HEART RATE: 91 BPM | TEMPERATURE: 98.7 F | RESPIRATION RATE: 18 BRPM

## 2022-12-01 DIAGNOSIS — R74.01 ELEVATED TRANSAMINASE LEVEL: ICD-10-CM

## 2022-12-01 DIAGNOSIS — F17.200 TOBACCO DEPENDENCE: ICD-10-CM

## 2022-12-01 DIAGNOSIS — G40.409 GRAND MAL SEIZURE DISORDER (HCC): ICD-10-CM

## 2022-12-01 PROCEDURE — 99215 OFFICE O/P EST HI 40 MIN: CPT | Performed by: PSYCHIATRY & NEUROLOGY

## 2022-12-01 PROCEDURE — 99211 OFF/OP EST MAY X REQ PHY/QHP: CPT | Performed by: PSYCHIATRY & NEUROLOGY

## 2022-12-01 RX ORDER — LEVETIRACETAM 500 MG/1
500 TABLET ORAL 2 TIMES DAILY
Qty: 180 TABLET | Refills: 12 | Status: SHIPPED | OUTPATIENT
Start: 2022-12-01 | End: 2023-12-11

## 2022-12-01 RX ORDER — LEVETIRACETAM 500 MG/1
500 TABLET ORAL 2 TIMES DAILY
Qty: 60 TABLET | Refills: 12 | Status: SHIPPED | OUTPATIENT
Start: 2022-12-01 | End: 2022-12-01

## 2022-12-01 ASSESSMENT — ANXIETY QUESTIONNAIRES
4. TROUBLE RELAXING: NOT AT ALL
IF YOU CHECKED OFF ANY PROBLEMS ON THIS QUESTIONNAIRE, HOW DIFFICULT HAVE THESE PROBLEMS MADE IT FOR YOU TO DO YOUR WORK, TAKE CARE OF THINGS AT HOME, OR GET ALONG WITH OTHER PEOPLE: NOT DIFFICULT AT ALL
7. FEELING AFRAID AS IF SOMETHING AWFUL MIGHT HAPPEN: NOT AT ALL
6. BECOMING EASILY ANNOYED OR IRRITABLE: NOT AT ALL
1. FEELING NERVOUS, ANXIOUS, OR ON EDGE: SEVERAL DAYS
2. NOT BEING ABLE TO STOP OR CONTROL WORRYING: SEVERAL DAYS
GAD7 TOTAL SCORE: 2
5. BEING SO RESTLESS THAT IT IS HARD TO SIT STILL: NOT AT ALL
3. WORRYING TOO MUCH ABOUT DIFFERENT THINGS: NOT AT ALL

## 2022-12-01 ASSESSMENT — PATIENT HEALTH QUESTIONNAIRE - PHQ9: CLINICAL INTERPRETATION OF PHQ2 SCORE: 0

## 2022-12-01 ASSESSMENT — FIBROSIS 4 INDEX: FIB4 SCORE: 1.99

## 2022-12-01 NOTE — PROGRESS NOTES
"Reason for Consult:  Epilepsy    History of present illness:    Franchesca Ortez 37 y.o. right handed woman who went to [facility] x1 years and [facility] (half way through a nursing program). She lives with 2 room mates and has not been working since June 13th 2022. 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    Childhood Epilepsy- age 4 and stopped at age 11 > treated for 7 years with a seizure medication. She had prior full body convulsion events. Stopped medication at age 11. June 2022 was at home and on a normal for her (although she may have been drinking more than  normal > she recalls going into the kitchen and in retrospect Franchesca shouting out \"oh shit\" (maybe she recognized something was not right)> she had fallen down in the middle of day and a room mate> he was convulsing and called 911.    ED Note from June 13th 2022 as below:    Ms. Ortez is a 36 y.o. female with history of mood disorder, alcohol dependence, seizure disorder, fibromyalgia, prior suicide attempt who presented 6/13/2022 with evaluation for seizure.  History limited as patient appears somnolent after receiving Ativan and also likely in postictal confusion state.  Per ER staff, patient had another seizure episode in the ER which was resolved with 2 mg IV Ativan administration.  CT head noted right parietal SAH, right parietal scalp hematoma, no calvarial fracture.  Trauma service, neurology services were consulted.  ICU was also consulted --recommended IMCU admission.  She was admitted to medicine service IMCU.  Due to severe alcohol withdrawal, she was placed on IV Precedex drip.  Due to the marked swelling of her tongue, a cortrak was placed for medications and feeding.  The tube feeds were increased slowly due to the risk of refeeding syndrome given her significant alcohol abuse.  She had two 4-hour chlorhexidine scrubs to her tongue.  On 6/15/2022 the Precedex drip was turned off and she was " "maintained on intermittent Ativan.  On 6/16/2022 she was able to swallow a full liquid diet safely as was assessed by speech-language pathology.  Her detox improved substantially though she was not quite ready discharge as she was unstable on her feet.  I called her father who came to bedside and we had a long discussion with Franchesca.  He is quite hopeful she can get into an inpatient detox program.   has met with her and provided information on detox facilities that she can apply for on a voluntary basis.  Today she is alert and oriented x4 and her steadiness has improved though certainly balance is not back to baseline.  She is quite adamant that she wants to go home today and she is not on a legal hold.  We discussed fall precautions as she recently had a traumatic subarachnoid hemorrhage.  A friend will pick her up her father will check on her today.  Alcohol and smoking cessation were discussed at length.  She is to refrain from NSAIDs and aspirin. Of note is that she does not have a drivers license.      Therefore, she is discharged in fair and stable condition to home with close outpatient follow-up.     The patient met 2-midnight criteria for an inpatient stay at the time of discharge    Final Result       1. Right parietal subarachnoid hemorrhage in two cortical sulci.   2. Right parietal scalp hematoma.   3. No calvarial fracture.   4. Chronic right-sided paranasal sinus disease.       Based solely on CT findings, the brain injury guideline category is mBIG 1.       SDH < 4mm   IPH < 4mm   SAH < 3 sulci and < 1mm       The original BIG retrospective analysis found radiographic progression in 0% of BIG 1 patients and 2.6% BIG 2.           10/15/2022: ED Note:  (had 2 seizure events that day- hours apart). She did desccribe> \"my head did not feel like\" up to lasting 3 minutes and this was not something recalls happening before June 2022. (She recalls having a \"weird feeling and knew something " "was not right\" back in June 2022 too).    Franchesca Ortez is a 37 y.o. female who presents to the emergency department who presents with seizure-like activity and hedger.  She states she is fell a couple times in the last couple days and thinks she might had a seizure.  She does have a history of withdrawal-like seizure approximately 4 months ago.  Since that time, she states that she is falling frequently.  She does believe she had a witnessed clonic tonic seizure.  She does endorse the fact of falling and waking up on the ground but not sure she had seizure.  She has not followed up with them in the past for this.  The patient has fever, shakes, chills, sweats, nausea, vomiting, loss of sensation or strength arms or legs.  She does drink alcohol daily and states she drank alcohol this morning.      Prior Alcohol use (1-2 beers and a 1-2 shots of liquor) x 10 years or so> infrequent beer use (1-2 beers per week) since June 2022.    She has not been having any headaches or new pains or discomforts in the last 1-2 months.    She has not noticed any impairment in thinking,reasoning,memory or ability to care for self in the last few months.    Tobacco- 1 pack per day since High School and continues    No history of IVDA or illicit drug use in adult life.    History of Suicide attempt (with ibuprofen) in 2018 or so> she adamantly denies feeling,being suicidal in the last 3 years or so.    Had EEG(s) as a child but since then.    Has NOT driven in nearly 1 year now.      No blood relatives that are known to have seizure disorder or other neurological conditions.    Patient Active Problem List    Diagnosis Date Noted    Tobacco dependence 06/16/2022    Thyroid nodule 06/14/2022    Abnormal platelet function test (HCC) 06/14/2022    Trauma 06/14/2022    Anemia 06/14/2022    Thrombocytopenia ([facility]) 06/14/2022    Seizure ([facility]) 06/13/2022    Alcohol withdrawal ([facility]) 06/13/2022    Tongue laceration 06/13/2022    SAH " (subarachnoid hemorrhage) ([facility]) 06/13/2022    Elevated LFTs 06/13/2022    Hypokalemia 06/13/2022    Acute encephalopathy 06/13/2022    Traumatic rhabdomyolysis ([facility]) 06/13/2022    Agitation 06/13/2022    Encounter for routine checking of intrauterine contraceptive device (IUD) 10/17/2018    Laceration and contusion of cerebral cortex 10/17/2018    Laceration of left index finger without foreign body without damage to nail 10/17/2018    Fibromyalgia 10/10/2018    Tylenol overdose, intentional self-harm, initial encounter ([facility]) 09/18/2018    Hx of suicide attempt 03/01/2018    Tobacco abuse 05/30/2016    Depression 05/29/2016    ETOH abuse 05/29/2016       Past medical history:   Past Medical History:   Diagnosis Date    Alcohol abuse     Depression     History of suicide attempt        Past surgical history:   Past Surgical History:   Procedure Laterality Date    GYN SURGERY      c section    PRIMARY C SECTION           Social history:   Social History     Socioeconomic History    Marital status:      Spouse name: Not on file    Number of children: Not on file    Years of education: Not on file    Highest education level: Not on file   Occupational History    Not on file   Tobacco Use    Smoking status: Every Day     Packs/day: 0.50     Years: 15.00     Pack years: 7.50     Types: Cigarettes    Smokeless tobacco: Never    Tobacco comments:     3/4 pk a day   Substance and Sexual Activity    Alcohol use: Yes    Drug use: No    Sexual activity: Yes     Partners: Male     Birth control/protection: I.U.D.   Other Topics Concern    Not on file   Social History Narrative    Not on file     Social Determinants of Health     Financial Resource Strain: Not on file   Food Insecurity: Not on file   Transportation Needs: Not on file   Physical Activity: Not on file   Stress: Not on file   Social Connections: Not on file   Intimate Partner Violence: Not on file   Housing Stability: Not on file       Family history:  "  Family History   Problem Relation Age of Onset    Hypertension Father     Diabetes Father     Cancer Maternal Grandmother         liver         Current medications:   No current outpatient medications on file.     No current facility-administered medications for this visit.       Medication Allergy:  No Known Allergies        Physical examination:   Vitals:    12/01/22 1356   BP: (!) 124/92   BP Location: Right arm   Patient Position: Sitting   BP Cuff Size: Adult   Pulse: 91   Resp: 18   Temp: 37.1 °C (98.7 °F)   TempSrc: Temporal   SpO2: 98%   Weight: 66 kg (145 lb 8.1 oz)   Height: 1.651 m (5' 5\")       Normal Cephalic Atraumatic.  General: Full Range of Movement around the Neck in all directions without restrictions or discrete pain evoked triggers.  No lower extremity edema.      Neurological  Exam:    Mental status: Awake, alert and fully oriented to person, place, time, and situation. Normal attention, concentration, and fund of knowledge for education level.  Did not appear/act combative,irritable,anxious,paranoid/delusional or aggressive to or with me.  Speech and language: Speech is fluent without errors, clear, intact to repetition, and intact to naming.     Follows 3 step motor commands in sequence without significant delay and correctly.    Cranial nerve exam:  II: Pupils are equally round and reactive to light. Visual fields are intact by confrontation.  III, IV, VI: EOMI, no diplopia, no ptosis.  V: Sensation to light touch is normal over V1-3 distributions bilaterally.  .  VII: Facial movements are symmetrical. There is no facial droop. .  VIII: Hearing intact to soft speech and finger rub bilaterally  IX: Palate elevates symmetrically, uvula is midline. Dysarthria is not present.  XI: Shoulder shrug are symmetrical and strong.   XII: Tongue protrudes midline.        Motor exam:  Muscle tone is normal in all 4 limbs. and No abnormal movements appreciated.    Muscle strength:    Neck " Flexors/Extensors: 5/5       Right  Left  Deltoid   5/5  5/5      Biceps   5/5  5/5  Triceps               5/5  5/5   Wrist extensors 5/5  5/5  Wrist flexors  5/5  5/5     5/5  5/5  Interossei  5/5  5/5  Thenar (APB)  5/5  5/5   Hip flexors  5/5  5/5  Quadriceps  5/5  5/5    Hamstrings  5/5  5/5  Dorsiflexors  5/5  5/5  Plantarflexors  5/5  5/5  Toe extension  5/5  5/5  Toe Flexors                5/5                   5/5          Reflexes:       Right  Left  Biceps   2/4  2/4  Triceps             2/4 2/4  Brachioradialis            2/4  2/4  Knee jerk  2/4 2/4  Ankle jerk  2/4 2/4     Frontal release signs are normal.    bilaterally toes are downgoing to plantar stimulation..    Coordination (finger-to-nose, heel/knee/shin, rapid alternating movements) was normal.     There was no truncal ataxia, no tremors, and no dysmetria.     Station and gait - >Easily stands up from exam chair without retropulsion,veering,leaning,swaying (to either side).   Arm swing symmetrical.    No Rombergism.      Labs and Tests:    Blood Work reviewed from 10/15/2022:    10/15/2022:    Component Ref Range & Units 1 mo ago 5 mo ago 6 yr ago   Diagnostic Alcohol <10.1 mg/dL 115.5 High   <10.1  0.25 High  R            NEUROIMAGING:     Head CT(s) reviewed :    10/15/2022 3:36 PM     HISTORY/REASON FOR EXAM:  pain following trauma, hx of SAH.  Seizure     TECHNIQUE/EXAM DESCRIPTION AND NUMBER OF VIEWS:  CT of the head without contrast.     The study was performed on a helical multidetector CT scanner. Contiguous 2.5 mm axial sections were obtained from the skull base through the vertex.     Up to date radiation dose reduction adjustments have been utilized to meet ALARA standards for radiation dose reduction.     COMPARISON:  6/14/2022     FINDINGS:  Ventricles, cisterns and cortical sulci are within normal limits.  No acute intracranial hemorrhage, major vascular territory infarct, mass effect, midline shift or hydrocephalus.     The  "visualized paranasal sinuses and mastoids are clear.  No depressed calvarial fracture.     IMPRESSION:     No acute intracranial abnormality.                       Exam Ended: 10/15/22  3:48 PM Last Resulted: 10/15/22  3:51 PM                  Impression/Plans/Recommendations:    Epilepsy- onset as child; recurrent episodes since June 2022 and not all clearly related alcohol use. Has had several episodes with a warning (\"hard to describe\" sensations).     Last event was in October 15th 2022  (had no warning for 1st event but did with second event).    Has no evidence for a progressive encephalopathy at this time or in the last month or so.    Continues to drink alcohol infrequently.    Plans:    A. Brain MRI to evaluate structure of grey matter and frontal-temporal lobes.    B. 24  EEG test (ambulatory).    C. Start Keppra medication (500 mg PO BID- 1000 mg a day) for seizure prevention. Use of medication,risks of not taking it (ie, status epilepticus and/or recurrent seizures) reviewed today.    D. Compliance strongly recommended with use of Keppra.    E. Check Blood work of level in 1-2 weeks after starting medication (3 hour window to check blood).    F. Will also check liver enzymes to compare to those from 10/15/2022 which were elevated.    G. Strongly recommended complete abstinence from alcohol use at this time.    H. 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      The patient understands and agrees that due to the complexity of his/her diagnosis, results of any testing and further recommendations will typically be discussed/made during a face to face encounter in my office and for simpler  matters either by a phone call or email correspondence. The patient and/or family further understands it is their responsibility to keep proper follow up as needed and when suggested by me.      I have performed  a history and physical exam and a directed /focused  ROS " today.    Total time spent today or this patient's care was  42 minutes and included reviewing diagnostic workup to date (labs and imaging that include interpreting such tests relevant to this patient's neurological condition),  reviewing/obtaining separately obtained history with patient for today's neurological problem(s) , ordering either/or medications and additional tests, giving advise and suggestions on the present neurological problem and  documenting the clinical information in the EMR.    Follow up in 3-4 months or so.    Paco Raya MD  [facility].   [facility]

## 2023-01-11 ENCOUNTER — APPOINTMENT (OUTPATIENT)
Dept: RADIOLOGY | Facility: MEDICAL CENTER | Age: 38
DRG: 894 | End: 2023-01-11
Attending: EMERGENCY MEDICINE
Payer: MEDICAID

## 2023-01-11 ENCOUNTER — HOSPITAL ENCOUNTER (INPATIENT)
Facility: MEDICAL CENTER | Age: 38
LOS: 1 days | DRG: 894 | End: 2023-01-12
Attending: EMERGENCY MEDICINE | Admitting: INTERNAL MEDICINE
Payer: MEDICAID

## 2023-01-11 DIAGNOSIS — F10.10 ALCOHOL ABUSE: ICD-10-CM

## 2023-01-11 DIAGNOSIS — Z91.199 NONCOMPLIANCE: ICD-10-CM

## 2023-01-11 DIAGNOSIS — R56.9 SEIZURE (HCC): ICD-10-CM

## 2023-01-11 PROBLEM — F10.139 ALCOHOL ABUSE WITH WITHDRAWAL (HCC): Status: ACTIVE | Noted: 2023-01-11

## 2023-01-11 LAB
ALBUMIN SERPL BCP-MCNC: 4 G/DL (ref 3.2–4.9)
ALBUMIN/GLOB SERPL: 1.3 G/DL
ALP SERPL-CCNC: 84 U/L (ref 30–99)
ALT SERPL-CCNC: 115 U/L (ref 2–50)
AMPHET UR QL SCN: NEGATIVE
ANION GAP SERPL CALC-SCNC: 13 MMOL/L (ref 7–16)
AST SERPL-CCNC: 167 U/L (ref 12–45)
BARBITURATES UR QL SCN: NEGATIVE
BASOPHILS # BLD AUTO: 1.6 % (ref 0–1.8)
BASOPHILS # BLD: 0.11 K/UL (ref 0–0.12)
BENZODIAZ UR QL SCN: NEGATIVE
BILIRUB SERPL-MCNC: 0.6 MG/DL (ref 0.1–1.5)
BUN SERPL-MCNC: 6 MG/DL (ref 8–22)
BZE UR QL SCN: NEGATIVE
CALCIUM ALBUM COR SERPL-MCNC: 9.4 MG/DL (ref 8.5–10.5)
CALCIUM SERPL-MCNC: 9.4 MG/DL (ref 8.5–10.5)
CANNABINOIDS UR QL SCN: NEGATIVE
CHLORIDE SERPL-SCNC: 100 MMOL/L (ref 96–112)
CO2 SERPL-SCNC: 21 MMOL/L (ref 20–33)
CREAT SERPL-MCNC: 0.47 MG/DL (ref 0.5–1.4)
EOSINOPHIL # BLD AUTO: 0.01 K/UL (ref 0–0.51)
EOSINOPHIL NFR BLD: 0.1 % (ref 0–6.9)
ERYTHROCYTE [DISTWIDTH] IN BLOOD BY AUTOMATED COUNT: 48.1 FL (ref 35.9–50)
ETHANOL BLD-MCNC: <10.1 MG/DL
GFR SERPLBLD CREATININE-BSD FMLA CKD-EPI: 126 ML/MIN/1.73 M 2
GLOBULIN SER CALC-MCNC: 3.1 G/DL (ref 1.9–3.5)
GLUCOSE SERPL-MCNC: 96 MG/DL (ref 65–99)
HCT VFR BLD AUTO: 38.5 % (ref 37–47)
HGB BLD-MCNC: 13 G/DL (ref 12–16)
IMM GRANULOCYTES # BLD AUTO: 0.07 K/UL (ref 0–0.11)
IMM GRANULOCYTES NFR BLD AUTO: 1 % (ref 0–0.9)
LYMPHOCYTES # BLD AUTO: 0.72 K/UL (ref 1–4.8)
LYMPHOCYTES NFR BLD: 10.3 % (ref 22–41)
MAGNESIUM SERPL-MCNC: 1.6 MG/DL (ref 1.5–2.5)
MCH RBC QN AUTO: 34 PG (ref 27–33)
MCHC RBC AUTO-ENTMCNC: 33.8 G/DL (ref 33.6–35)
MCV RBC AUTO: 100.8 FL (ref 81.4–97.8)
METHADONE UR QL SCN: NEGATIVE
MONOCYTES # BLD AUTO: 0.56 K/UL (ref 0–0.85)
MONOCYTES NFR BLD AUTO: 8 % (ref 0–13.4)
NEUTROPHILS # BLD AUTO: 5.49 K/UL (ref 2–7.15)
NEUTROPHILS NFR BLD: 79 % (ref 44–72)
NRBC # BLD AUTO: 0 K/UL
NRBC BLD-RTO: 0 /100 WBC
OPIATES UR QL SCN: NEGATIVE
OXYCODONE UR QL SCN: NEGATIVE
PCP UR QL SCN: NEGATIVE
PLATELET # BLD AUTO: 138 K/UL (ref 164–446)
PMV BLD AUTO: 9.8 FL (ref 9–12.9)
POTASSIUM SERPL-SCNC: 4.1 MMOL/L (ref 3.6–5.5)
PROPOXYPH UR QL SCN: NEGATIVE
PROT SERPL-MCNC: 7.1 G/DL (ref 6–8.2)
RBC # BLD AUTO: 3.82 M/UL (ref 4.2–5.4)
SODIUM SERPL-SCNC: 134 MMOL/L (ref 135–145)
WBC # BLD AUTO: 7 K/UL (ref 4.8–10.8)

## 2023-01-11 PROCEDURE — 94760 N-INVAS EAR/PLS OXIMETRY 1: CPT

## 2023-01-11 PROCEDURE — 700111 HCHG RX REV CODE 636 W/ 250 OVERRIDE (IP): Performed by: EMERGENCY MEDICINE

## 2023-01-11 PROCEDURE — 83735 ASSAY OF MAGNESIUM: CPT

## 2023-01-11 PROCEDURE — 70450 CT HEAD/BRAIN W/O DYE: CPT

## 2023-01-11 PROCEDURE — 85025 COMPLETE CBC W/AUTO DIFF WBC: CPT

## 2023-01-11 PROCEDURE — 700111 HCHG RX REV CODE 636 W/ 250 OVERRIDE (IP): Performed by: INTERNAL MEDICINE

## 2023-01-11 PROCEDURE — 96376 TX/PRO/DX INJ SAME DRUG ADON: CPT

## 2023-01-11 PROCEDURE — 82077 ASSAY SPEC XCP UR&BREATH IA: CPT

## 2023-01-11 PROCEDURE — 36415 COLL VENOUS BLD VENIPUNCTURE: CPT

## 2023-01-11 PROCEDURE — 700105 HCHG RX REV CODE 258: Performed by: INTERNAL MEDICINE

## 2023-01-11 PROCEDURE — 80307 DRUG TEST PRSMV CHEM ANLYZR: CPT

## 2023-01-11 PROCEDURE — HZ2ZZZZ DETOXIFICATION SERVICES FOR SUBSTANCE ABUSE TREATMENT: ICD-10-PCS | Performed by: INTERNAL MEDICINE

## 2023-01-11 PROCEDURE — 99285 EMERGENCY DEPT VISIT HI MDM: CPT

## 2023-01-11 PROCEDURE — 700101 HCHG RX REV CODE 250: Performed by: INTERNAL MEDICINE

## 2023-01-11 PROCEDURE — A9270 NON-COVERED ITEM OR SERVICE: HCPCS | Performed by: INTERNAL MEDICINE

## 2023-01-11 PROCEDURE — 770020 HCHG ROOM/CARE - TELE (206)

## 2023-01-11 PROCEDURE — 99223 1ST HOSP IP/OBS HIGH 75: CPT | Performed by: INTERNAL MEDICINE

## 2023-01-11 PROCEDURE — 96372 THER/PROPH/DIAG INJ SC/IM: CPT

## 2023-01-11 PROCEDURE — 700102 HCHG RX REV CODE 250 W/ 637 OVERRIDE(OP): Performed by: INTERNAL MEDICINE

## 2023-01-11 PROCEDURE — 96365 THER/PROPH/DIAG IV INF INIT: CPT

## 2023-01-11 PROCEDURE — 96366 THER/PROPH/DIAG IV INF ADDON: CPT

## 2023-01-11 PROCEDURE — 96375 TX/PRO/DX INJ NEW DRUG ADDON: CPT

## 2023-01-11 PROCEDURE — 80053 COMPREHEN METABOLIC PANEL: CPT

## 2023-01-11 RX ORDER — SODIUM CHLORIDE 9 MG/ML
INJECTION, SOLUTION INTRAVENOUS CONTINUOUS
Status: DISCONTINUED | OUTPATIENT
Start: 2023-01-11 | End: 2023-01-12 | Stop reason: HOSPADM

## 2023-01-11 RX ORDER — POLYETHYLENE GLYCOL 3350 17 G/17G
1 POWDER, FOR SOLUTION ORAL
Status: DISCONTINUED | OUTPATIENT
Start: 2023-01-11 | End: 2023-01-12 | Stop reason: HOSPADM

## 2023-01-11 RX ORDER — LORAZEPAM 2 MG/ML
1 INJECTION INTRAMUSCULAR ONCE
Status: COMPLETED | OUTPATIENT
Start: 2023-01-11 | End: 2023-01-11

## 2023-01-11 RX ORDER — AMOXICILLIN 250 MG
2 CAPSULE ORAL 2 TIMES DAILY
Status: DISCONTINUED | OUTPATIENT
Start: 2023-01-11 | End: 2023-01-12 | Stop reason: HOSPADM

## 2023-01-11 RX ORDER — ENOXAPARIN SODIUM 100 MG/ML
40 INJECTION SUBCUTANEOUS DAILY
Status: DISCONTINUED | OUTPATIENT
Start: 2023-01-11 | End: 2023-01-12 | Stop reason: HOSPADM

## 2023-01-11 RX ORDER — FOLIC ACID 1 MG/1
1 TABLET ORAL DAILY
Status: DISCONTINUED | OUTPATIENT
Start: 2023-01-12 | End: 2023-01-12 | Stop reason: HOSPADM

## 2023-01-11 RX ORDER — LORAZEPAM 0.5 MG/1
0.5 TABLET ORAL EVERY 4 HOURS PRN
Status: DISCONTINUED | OUTPATIENT
Start: 2023-01-11 | End: 2023-01-12 | Stop reason: HOSPADM

## 2023-01-11 RX ORDER — LORAZEPAM 2 MG/1
2 TABLET ORAL
Status: DISCONTINUED | OUTPATIENT
Start: 2023-01-11 | End: 2023-01-12 | Stop reason: HOSPADM

## 2023-01-11 RX ORDER — CHLORDIAZEPOXIDE HYDROCHLORIDE 25 MG/1
50 CAPSULE, GELATIN COATED ORAL EVERY 6 HOURS
Status: DISCONTINUED | OUTPATIENT
Start: 2023-01-11 | End: 2023-01-12 | Stop reason: HOSPADM

## 2023-01-11 RX ORDER — LEVETIRACETAM 500 MG/5ML
1000 INJECTION, SOLUTION, CONCENTRATE INTRAVENOUS ONCE
Status: COMPLETED | OUTPATIENT
Start: 2023-01-11 | End: 2023-01-11

## 2023-01-11 RX ORDER — LORAZEPAM 2 MG/ML
1 INJECTION INTRAMUSCULAR
Status: DISCONTINUED | OUTPATIENT
Start: 2023-01-11 | End: 2023-01-12 | Stop reason: HOSPADM

## 2023-01-11 RX ORDER — LEVETIRACETAM 500 MG/1
500 TABLET ORAL 2 TIMES DAILY
Status: DISCONTINUED | OUTPATIENT
Start: 2023-01-11 | End: 2023-01-12 | Stop reason: HOSPADM

## 2023-01-11 RX ORDER — LORAZEPAM 2 MG/ML
2 INJECTION INTRAMUSCULAR
Status: DISCONTINUED | OUTPATIENT
Start: 2023-01-11 | End: 2023-01-12 | Stop reason: HOSPADM

## 2023-01-11 RX ORDER — LORAZEPAM 2 MG/ML
1.5 INJECTION INTRAMUSCULAR
Status: DISCONTINUED | OUTPATIENT
Start: 2023-01-11 | End: 2023-01-12 | Stop reason: HOSPADM

## 2023-01-11 RX ORDER — LORAZEPAM 1 MG/1
1 TABLET ORAL EVERY 4 HOURS PRN
Status: DISCONTINUED | OUTPATIENT
Start: 2023-01-11 | End: 2023-01-12 | Stop reason: HOSPADM

## 2023-01-11 RX ORDER — BISACODYL 10 MG
10 SUPPOSITORY, RECTAL RECTAL
Status: DISCONTINUED | OUTPATIENT
Start: 2023-01-11 | End: 2023-01-12 | Stop reason: HOSPADM

## 2023-01-11 RX ORDER — LORAZEPAM 2 MG/ML
0.5 INJECTION INTRAMUSCULAR EVERY 4 HOURS PRN
Status: DISCONTINUED | OUTPATIENT
Start: 2023-01-11 | End: 2023-01-12 | Stop reason: HOSPADM

## 2023-01-11 RX ORDER — LORAZEPAM 2 MG/1
4 TABLET ORAL
Status: DISCONTINUED | OUTPATIENT
Start: 2023-01-11 | End: 2023-01-12 | Stop reason: HOSPADM

## 2023-01-11 RX ORDER — CHLORDIAZEPOXIDE HYDROCHLORIDE 25 MG/1
25 CAPSULE, GELATIN COATED ORAL EVERY 6 HOURS
Status: DISCONTINUED | OUTPATIENT
Start: 2023-01-12 | End: 2023-01-12 | Stop reason: HOSPADM

## 2023-01-11 RX ORDER — GAUZE BANDAGE 2" X 2"
100 BANDAGE TOPICAL DAILY
Status: DISCONTINUED | OUTPATIENT
Start: 2023-01-12 | End: 2023-01-12 | Stop reason: HOSPADM

## 2023-01-11 RX ADMIN — LORAZEPAM 0.5 MG: 2 INJECTION INTRAMUSCULAR; INTRAVENOUS at 18:39

## 2023-01-11 RX ADMIN — LEVETIRACETAM 1000 MG: 100 INJECTION, SOLUTION INTRAVENOUS at 15:01

## 2023-01-11 RX ADMIN — CHLORDIAZEPOXIDE HYDROCHLORIDE 50 MG: 25 CAPSULE ORAL at 17:22

## 2023-01-11 RX ADMIN — LEVETIRACETAM 500 MG: 500 TABLET, FILM COATED ORAL at 17:22

## 2023-01-11 RX ADMIN — ENOXAPARIN SODIUM 40 MG: 40 INJECTION SUBCUTANEOUS at 17:29

## 2023-01-11 RX ADMIN — THIAMINE HYDROCHLORIDE: 100 INJECTION, SOLUTION INTRAMUSCULAR; INTRAVENOUS at 17:20

## 2023-01-11 RX ADMIN — LORAZEPAM 1 MG: 2 INJECTION INTRAMUSCULAR; INTRAVENOUS at 15:37

## 2023-01-11 RX ADMIN — CHLORDIAZEPOXIDE HYDROCHLORIDE 50 MG: 25 CAPSULE ORAL at 23:46

## 2023-01-11 RX ADMIN — SODIUM CHLORIDE: 9 INJECTION, SOLUTION INTRAVENOUS at 18:35

## 2023-01-11 ASSESSMENT — LIFESTYLE VARIABLES
TOTAL SCORE: 6
VISUAL DISTURBANCES: NOT PRESENT
TREMOR: MODERATE TREMOR WITH ARMS EXTENDED
TREMOR: *
AGITATION: NORMAL ACTIVITY
ANXIETY: NO ANXIETY (AT EASE)
NAUSEA AND VOMITING: NO NAUSEA AND NO VOMITING
AGITATION: NORMAL ACTIVITY
TREMOR: *
VISUAL DISTURBANCES: NOT PRESENT
AGITATION: NORMAL ACTIVITY
PAROXYSMAL SWEATS: NO SWEAT VISIBLE
TOTAL SCORE: 4
PAROXYSMAL SWEATS: NO SWEAT VISIBLE
VISUAL DISTURBANCES: NOT PRESENT
TOTAL SCORE: 7
TREMOR: MODERATE TREMOR WITH ARMS EXTENDED
AUDITORY DISTURBANCES: NOT PRESENT
HEADACHE, FULLNESS IN HEAD: VERY MILD
NAUSEA AND VOMITING: NO NAUSEA AND NO VOMITING
PAROXYSMAL SWEATS: NO SWEAT VISIBLE
HEADACHE, FULLNESS IN HEAD: MILD
NAUSEA AND VOMITING: NO NAUSEA AND NO VOMITING
TOTAL SCORE: 6
ORIENTATION AND CLOUDING OF SENSORIUM: ORIENTED AND CAN DO SERIAL ADDITIONS
ANXIETY: *
AUDITORY DISTURBANCES: NOT PRESENT
AUDITORY DISTURBANCES: NOT PRESENT
VISUAL DISTURBANCES: NOT PRESENT
NAUSEA AND VOMITING: NO NAUSEA AND NO VOMITING
HEADACHE, FULLNESS IN HEAD: MILD
AUDITORY DISTURBANCES: NOT PRESENT
ORIENTATION AND CLOUDING OF SENSORIUM: ORIENTED AND CAN DO SERIAL ADDITIONS
AGITATION: NORMAL ACTIVITY
PAROXYSMAL SWEATS: NO SWEAT VISIBLE
ANXIETY: NO ANXIETY (AT EASE)
ORIENTATION AND CLOUDING OF SENSORIUM: ORIENTED AND CAN DO SERIAL ADDITIONS
HEADACHE, FULLNESS IN HEAD: MILD
ORIENTATION AND CLOUDING OF SENSORIUM: ORIENTED AND CAN DO SERIAL ADDITIONS
ANXIETY: NO ANXIETY (AT EASE)

## 2023-01-11 ASSESSMENT — PATIENT HEALTH QUESTIONNAIRE - PHQ9
2. FEELING DOWN, DEPRESSED, IRRITABLE, OR HOPELESS: NOT AT ALL
1. LITTLE INTEREST OR PLEASURE IN DOING THINGS: NOT AT ALL
SUM OF ALL RESPONSES TO PHQ9 QUESTIONS 1 AND 2: 0

## 2023-01-11 ASSESSMENT — ENCOUNTER SYMPTOMS
SHORTNESS OF BREATH: 0
PALPITATIONS: 0
ABDOMINAL PAIN: 0
FEVER: 0
VOMITING: 0
NAUSEA: 1
DEPRESSION: 0
CHILLS: 0
ORTHOPNEA: 0
DIZZINESS: 0
HEADACHES: 1
COUGH: 0
MYALGIAS: 0

## 2023-01-11 ASSESSMENT — FIBROSIS 4 INDEX
FIB4 SCORE: 1.99
FIB4 SCORE: 4.06

## 2023-01-11 NOTE — ED NOTES
Med Rec Complete per patient  Allergies Reviewed with patient  No antibiotics within the last 30 days  Patient's Preferred Pharmacy:  [facility] on [address]

## 2023-01-11 NOTE — ED NOTES
Patient medicated per MAR. Patient resting in bed with even respirations. No additional needs at this time.

## 2023-01-11 NOTE — ED TRIAGE NOTES
Chief Complaint   Patient presents with    Seizure     Pt bib ems from [facility]. Had seizure lasted for 5 mins then caught in the chair per ems. +oral trauma. - head trauma. Has hx of seizure and non-compliant.     Has bgl 130 on scene. Arrived aaoc4. Gcs of 15

## 2023-01-11 NOTE — ED PROVIDER NOTES
"ED Provider Note    CHIEF COMPLAINT  Chief Complaint   Patient presents with    Seizure     Pt bib ems from bar. Had seizure lasted for 5 mins then caught in the chair per ems. +oral trauma. - head trauma. Has hx of seizure and non-compliant.       EXTERNAL RECORDS REVIEWED  Select: Other none    HPI/ROS  LIMITATION TO HISTORY   Select: : None  OUTSIDE HISTORIAN(S):  Select: EMS      Franchesca Ortez is a 37 y.o. female with history of EtOH abuse and depression who presents via EMS for evaluation following witnessed seizure while at a bar.    Patient reports that her last seizure was in mid-October.  She saw Dr. Raya from neurology and was diagnosed with \"recurrent childhood epilepsy\" after having had a seizure in June and not having had 1 since the age of 11.  She has not started her antiseizure medications as of yet.    Patient reports history of alcohol use.  She states she drinks 2-3 beers daily, \"sometimes more.\"  She has a history of DTs but no history of alcohol withdrawal seizures to her knowledge.  This morning she drank a half a beer and had \"some Jenna Zara's in my coffee.\"    Patient reports tongue pain secondary to biting during the seizure and a moderate headache.  She denies recent fever, chills, head trauma prior to the seizure, vomiting, diarrhea, anticoagulant use.      PAST MEDICAL HISTORY   has a past medical history of Alcohol abuse, Depression, and History of suicide attempt.    SURGICAL HISTORY   has a past surgical history that includes gyn surgery and primary c section.    FAMILY HISTORY  Family History   Problem Relation Age of Onset    Hypertension Father     Diabetes Father     Cancer Maternal Grandmother         liver       SOCIAL HISTORY  Social History     Tobacco Use    Smoking status: Every Day     Packs/day: 0.50     Years: 15.00     Pack years: 7.50     Types: Cigarettes    Smokeless tobacco: Never    Tobacco comments:     3/4 pk a day   Substance and Sexual Activity    Alcohol " "use: Yes    Drug use: No    Sexual activity: Yes     Partners: Male     Birth control/protection: I.U.D.       CURRENT MEDICATIONS  Home Medications       Reviewed by Glenny Goodman (Pharmacy Tech) on 01/11/23 at 1501  Med List Status: Complete     Medication Last Dose Status   levETIRAcetam (KEPPRA) 500 MG Tab Not started yet Active                    ALLERGIES  No Known Allergies    PHYSICAL EXAM  VITAL SIGNS: /86   Pulse 93   Temp 36.1 °C (97 °F) (Temporal)   Resp 18   Ht 1.651 m (5' 5\")   Wt 65.8 kg (145 lb)   SpO2 96%   BMI 24.13 kg/m²    General:  WDWN female, nontoxic appearing; A+Ox3; V/S as above  Skin: warm and dry; good color; no rash  HEENT: NCAT; EOMs intact; PERRL; no scleral icterus; small bilateral tongue lacerations; no active bleeding  Neck: FROM; no LAD, no meningismus  Cardiovascular: Regular heart rate and rhythm.  No murmurs, rubs, or gallops; pulses 2+ bilaterally radially and DP areas  Lungs: No respiratory distress or tachypnea; Clear to auscultation with good air movement bilaterally.  No wheezes, rhonchi, or rales.   Abdomen: BS present; soft; NTND; no rebound, guarding, or rigidity.  No organomegaly or pulsatile mass  Extremities: BARRETT x 4; no e/o trauma; no pedal edema; neg Radha's  Neurologic: CNs III-XII grossly intact; speech clear; distal sensation intact; strength 5/5 UE/LEs; gait steady  Psychiatric: Appropriate affect, normal mood      DIAGNOSTIC STUDIES / PROCEDURES  LABS  Results for orders placed or performed during the hospital encounter of 01/11/23   CBC WITH DIFFERENTIAL   Result Value Ref Range    WBC 7.0 4.8 - 10.8 K/uL    RBC 3.82 (L) 4.20 - 5.40 M/uL    Hemoglobin 13.0 12.0 - 16.0 g/dL    Hematocrit 38.5 37.0 - 47.0 %    .8 (H) 81.4 - 97.8 fL    MCH 34.0 (H) 27.0 - 33.0 pg    MCHC 33.8 33.6 - 35.0 g/dL    RDW 48.1 35.9 - 50.0 fL    Platelet Count 138 (L) 164 - 446 K/uL    MPV 9.8 9.0 - 12.9 fL    Neutrophils-Polys 79.00 (H) 44.00 - 72.00 %    " Lymphocytes 10.30 (L) 22.00 - 41.00 %    Monocytes 8.00 0.00 - 13.40 %    Eosinophils 0.10 0.00 - 6.90 %    Basophils 1.60 0.00 - 1.80 %    Immature Granulocytes 1.00 (H) 0.00 - 0.90 %    Nucleated RBC 0.00 /100 WBC    Neutrophils (Absolute) 5.49 2.00 - 7.15 K/uL    Lymphs (Absolute) 0.72 (L) 1.00 - 4.80 K/uL    Monos (Absolute) 0.56 0.00 - 0.85 K/uL    Eos (Absolute) 0.01 0.00 - 0.51 K/uL    Baso (Absolute) 0.11 0.00 - 0.12 K/uL    Immature Granulocytes (abs) 0.07 0.00 - 0.11 K/uL    NRBC (Absolute) 0.00 K/uL     Chemistry pending    RADIOLOGY  CT-HEAD W/O   Final Result      No acute intracranial abnormality.                        COURSE & MEDICAL DECISION MAKING    ED Observation Status? No; Patient does not meet criteria for ED Observation.     INITIAL ASSESSMENT AND PLAN  Care Narrative: This is a 37-year-old female who has a history of epilepsy as a child and recently was diagnosed with recurrent childhood epilepsy.  She has not started her antiseizure medications as she is afraid to take them.  Patient also drinks daily and has a history of DTs.  She is tremulous on my exam and smells of alcohol.  Today seizure may be alcohol withdrawal related or due to medical noncompliance.  I also considered electrolyte abnormality and intracranial hemorrhage.    Labs and imaging were ordered.      Patient was medicated with Keppra and Ativan.    CT head negative for acute pathology.    CBC demonstrates lymphopenia, elevation immature granulocytes and a normal white blood cell count.  UDS is negative.    4:31 PM  Patient's chemistry panel is not yet resulted.  Third blood draw is required due to hemolysis.  These results are pending.  I have signed the patient out to Dr. Echols here in the ER.  I spoke with the hospitalist, Dr. Potts, who agrees to admit the patient for alcohol withdrawal seizure and is aware that the chemistry panel is still pending.    ADDITIONAL PROBLEM LIST AND DISPOSITION    Problem #1: Seizure,  alcohol withdrawal versus epileptic  Problem #2: Noncompliance  Problem #3: Alcohol abuse    I have discussed management of the patient with the following physicians and LISET's:   Dr. Potts/hospitalist    Discussion of management with other Q or appropriate source(s): Select: None     FINAL DIAGNOSIS  1. Seizure (HCC)    2. Alcohol abuse    3. Noncompliance           Electronically signed by: Lois Gutierrez M.D., 1/11/2023 1:56 PM

## 2023-01-12 VITALS
BODY MASS INDEX: 24.79 KG/M2 | HEART RATE: 110 BPM | OXYGEN SATURATION: 95 % | WEIGHT: 148.81 LBS | RESPIRATION RATE: 18 BRPM | SYSTOLIC BLOOD PRESSURE: 124 MMHG | TEMPERATURE: 98.6 F | HEIGHT: 65 IN | DIASTOLIC BLOOD PRESSURE: 90 MMHG

## 2023-01-12 LAB
ALBUMIN SERPL BCP-MCNC: 3.7 G/DL (ref 3.2–4.9)
ALBUMIN/GLOB SERPL: 1.3 G/DL
ALP SERPL-CCNC: 76 U/L (ref 30–99)
ALT SERPL-CCNC: 93 U/L (ref 2–50)
ANION GAP SERPL CALC-SCNC: 11 MMOL/L (ref 7–16)
AST SERPL-CCNC: 124 U/L (ref 12–45)
BASOPHILS # BLD AUTO: 1.3 % (ref 0–1.8)
BASOPHILS # BLD: 0.06 K/UL (ref 0–0.12)
BILIRUB SERPL-MCNC: 0.8 MG/DL (ref 0.1–1.5)
BUN SERPL-MCNC: 7 MG/DL (ref 8–22)
CALCIUM ALBUM COR SERPL-MCNC: 9.2 MG/DL (ref 8.5–10.5)
CALCIUM SERPL-MCNC: 9 MG/DL (ref 8.5–10.5)
CHLORIDE SERPL-SCNC: 101 MMOL/L (ref 96–112)
CO2 SERPL-SCNC: 20 MMOL/L (ref 20–33)
CREAT SERPL-MCNC: 0.51 MG/DL (ref 0.5–1.4)
EOSINOPHIL # BLD AUTO: 0.09 K/UL (ref 0–0.51)
EOSINOPHIL NFR BLD: 1.9 % (ref 0–6.9)
ERYTHROCYTE [DISTWIDTH] IN BLOOD BY AUTOMATED COUNT: 47.7 FL (ref 35.9–50)
GFR SERPLBLD CREATININE-BSD FMLA CKD-EPI: 124 ML/MIN/1.73 M 2
GLOBULIN SER CALC-MCNC: 2.9 G/DL (ref 1.9–3.5)
GLUCOSE SERPL-MCNC: 93 MG/DL (ref 65–99)
HCT VFR BLD AUTO: 35 % (ref 37–47)
HGB BLD-MCNC: 12.1 G/DL (ref 12–16)
IMM GRANULOCYTES # BLD AUTO: 0.02 K/UL (ref 0–0.11)
IMM GRANULOCYTES NFR BLD AUTO: 0.4 % (ref 0–0.9)
LYMPHOCYTES # BLD AUTO: 1.46 K/UL (ref 1–4.8)
LYMPHOCYTES NFR BLD: 31.4 % (ref 22–41)
MCH RBC QN AUTO: 34.5 PG (ref 27–33)
MCHC RBC AUTO-ENTMCNC: 34.6 G/DL (ref 33.6–35)
MCV RBC AUTO: 99.7 FL (ref 81.4–97.8)
MONOCYTES # BLD AUTO: 0.37 K/UL (ref 0–0.85)
MONOCYTES NFR BLD AUTO: 8 % (ref 0–13.4)
NEUTROPHILS # BLD AUTO: 2.65 K/UL (ref 2–7.15)
NEUTROPHILS NFR BLD: 57 % (ref 44–72)
NRBC # BLD AUTO: 0 K/UL
NRBC BLD-RTO: 0 /100 WBC
PLATELET # BLD AUTO: 110 K/UL (ref 164–446)
PMV BLD AUTO: 10.3 FL (ref 9–12.9)
POTASSIUM SERPL-SCNC: 3.8 MMOL/L (ref 3.6–5.5)
PROT SERPL-MCNC: 6.6 G/DL (ref 6–8.2)
RBC # BLD AUTO: 3.51 M/UL (ref 4.2–5.4)
SODIUM SERPL-SCNC: 132 MMOL/L (ref 135–145)
WBC # BLD AUTO: 4.7 K/UL (ref 4.8–10.8)

## 2023-01-12 PROCEDURE — A9270 NON-COVERED ITEM OR SERVICE: HCPCS | Performed by: INTERNAL MEDICINE

## 2023-01-12 PROCEDURE — 700102 HCHG RX REV CODE 250 W/ 637 OVERRIDE(OP): Performed by: INTERNAL MEDICINE

## 2023-01-12 PROCEDURE — 36415 COLL VENOUS BLD VENIPUNCTURE: CPT

## 2023-01-12 PROCEDURE — 80053 COMPREHEN METABOLIC PANEL: CPT

## 2023-01-12 PROCEDURE — 85025 COMPLETE CBC W/AUTO DIFF WBC: CPT

## 2023-01-12 PROCEDURE — 99239 HOSP IP/OBS DSCHRG MGMT >30: CPT | Performed by: INTERNAL MEDICINE

## 2023-01-12 PROCEDURE — 700105 HCHG RX REV CODE 258: Performed by: INTERNAL MEDICINE

## 2023-01-12 RX ORDER — NICOTINE 21 MG/24HR
21 PATCH, TRANSDERMAL 24 HOURS TRANSDERMAL
Status: DISCONTINUED | OUTPATIENT
Start: 2023-01-12 | End: 2023-01-12 | Stop reason: HOSPADM

## 2023-01-12 RX ADMIN — THERA TABS 1 TABLET: TAB at 05:16

## 2023-01-12 RX ADMIN — Medication 100 MG: at 05:16

## 2023-01-12 RX ADMIN — FOLIC ACID 1 MG: 1 TABLET ORAL at 05:16

## 2023-01-12 RX ADMIN — NICOTINE TRANSDERMAL SYSTEM 21 MG: 21 PATCH, EXTENDED RELEASE TRANSDERMAL at 10:20

## 2023-01-12 RX ADMIN — SODIUM CHLORIDE: 9 INJECTION, SOLUTION INTRAVENOUS at 03:19

## 2023-01-12 RX ADMIN — LEVETIRACETAM 500 MG: 500 TABLET, FILM COATED ORAL at 05:15

## 2023-01-12 RX ADMIN — CHLORDIAZEPOXIDE HYDROCHLORIDE 50 MG: 25 CAPSULE ORAL at 05:15

## 2023-01-12 ASSESSMENT — LIFESTYLE VARIABLES
PAROXYSMAL SWEATS: NO SWEAT VISIBLE
ORIENTATION AND CLOUDING OF SENSORIUM: DATE DISORIENTATION BY MORE THAN TWO CALENDAR DAYS
AGITATION: NORMAL ACTIVITY
AUDITORY DISTURBANCES: NOT PRESENT
AGITATION: SOMEWHAT MORE THAN NORMAL ACTIVITY
TOTAL SCORE: 3
VISUAL DISTURBANCES: NOT PRESENT
ORIENTATION AND CLOUDING OF SENSORIUM: ORIENTED AND CAN DO SERIAL ADDITIONS
PAROXYSMAL SWEATS: BARELY PERCEPTIBLE SWEATING. PALMS MOIST
HEADACHE, FULLNESS IN HEAD: NOT PRESENT
TREMOR: *
VISUAL DISTURBANCES: NOT PRESENT
TOTAL SCORE: 7
AUDITORY DISTURBANCES: NOT PRESENT
AGITATION: NORMAL ACTIVITY
ORIENTATION AND CLOUDING OF SENSORIUM: ORIENTED AND CAN DO SERIAL ADDITIONS
HEADACHE, FULLNESS IN HEAD: MILD
ANXIETY: NO ANXIETY (AT EASE)
NAUSEA AND VOMITING: NO NAUSEA AND NO VOMITING
TOTAL SCORE: 4
NAUSEA AND VOMITING: NO NAUSEA AND NO VOMITING
NAUSEA AND VOMITING: NO NAUSEA AND NO VOMITING
AUDITORY DISTURBANCES: NOT PRESENT
TREMOR: *
PAROXYSMAL SWEATS: BARELY PERCEPTIBLE SWEATING. PALMS MOIST
VISUAL DISTURBANCES: NOT PRESENT
ANXIETY: NO ANXIETY (AT EASE)
ANXIETY: NO ANXIETY (AT EASE)
HEADACHE, FULLNESS IN HEAD: NOT PRESENT
TREMOR: TREMOR NOT VISIBLE BUT CAN BE FELT, FINGERTIP TO FINGERTIP

## 2023-01-12 ASSESSMENT — PAIN DESCRIPTION - PAIN TYPE: TYPE: ACUTE PAIN

## 2023-01-12 NOTE — PROGRESS NOTES
Pt received from ED. Tele monitor in place, seizure and fall precautions in place. VSS. CIWA Q4. Pt oriented to room. Educated on use of the call light. Pt demonstrated use of the call light. Discussed POC. All questions answered.

## 2023-01-12 NOTE — DISCHARGE PLANNING
Case Management Discharge Planning    Admission Date: 1/11/2023  GMLOS: 2.6  ALOS: 1    6-Clicks ADL Score:    6-Clicks Mobility Score:     Notified that patient was leaving AMA at this time.

## 2023-01-12 NOTE — ED NOTES
IV in right upper arm infiltrated, day shift RN placing new IV. Placed ice pack on right arm over infiltration site

## 2023-01-12 NOTE — ED NOTES
Assumed care of pt, received report from CAROL Aponte. Safety rounds completed, pt resting comfortably in the room.

## 2023-01-12 NOTE — H&P
Hospital Medicine History & Physical Note    Date of Service  1/11/2023    Primary Care Physician  Pcp Pt States None    Consultants  None       Code Status  Full Code    Chief Complaint  Chief Complaint   Patient presents with    Seizure     Pt bib ems from [facility]. Had seizure lasted for 5 mins then caught in the chair per ems. +oral trauma. - head trauma. Has hx of seizure and non-compliant.       History of Presenting Illness  Franchesca Ortez is a 37 y.o. female who presented 1/11/2023 with seizure and alcohol intoxication .  Patient has h/o of seizure and alcohol abuse she presented to ER after having a seizure episode while at the bar. Patient drinks daily. She was also supposed to be on seizure medication as outpatient but was not taking them   Here in ER she was loaded on keppra and will be admitted for further treatment   I discussed the plan of care with patient and ERP .    Review of Systems  Review of Systems   Constitutional:  Negative for chills and fever.   HENT:  Negative for ear pain, hearing loss and tinnitus.    Respiratory:  Negative for cough and shortness of breath.    Cardiovascular:  Negative for chest pain, palpitations and orthopnea.   Gastrointestinal:  Positive for nausea. Negative for abdominal pain and vomiting.   Genitourinary:  Negative for dysuria and urgency.   Musculoskeletal:  Negative for joint pain and myalgias.   Neurological:  Positive for headaches. Negative for dizziness.   Psychiatric/Behavioral:  Negative for depression.    All other systems reviewed and are negative.    Past Medical History   has a past medical history of Alcohol abuse, Depression, and History of suicide attempt.    Surgical History   has a past surgical history that includes gyn surgery and primary c section.     Family History  family history includes Cancer in her maternal grandmother; Diabetes in her father; Hypertension in her father.   Family history reviewed with patient. There is no family history  that is pertinent to the chief complaint.     Social History   reports that she has been smoking cigarettes. She has a 7.50 pack-year smoking history. She has never used smokeless tobacco. She reports current alcohol use. She reports that she does not use drugs.    Allergies  No Known Allergies    Medications  Prior to Admission Medications   Prescriptions Last Dose Informant Patient Reported? Taking?   levETIRAcetam (KEPPRA) 500 MG Tab Not started yet at N/A Patient No No   Sig: Take 1 Tablet by mouth 2 times a day.      Facility-Administered Medications: None       Physical Exam  Temp:  [36.1 °C (97 °F)] 36.1 °C (97 °F)  Pulse:  [77-95] 93  Resp:  [16-19] 18  BP: (124-128)/(67-86) 124/86  SpO2:  [96 %-98 %] 96 %  Blood Pressure: 124/86   Temperature: 36.1 °C (97 °F)   Pulse: 93   Respiration: 18   Pulse Oximetry: 96 %       Physical Exam  Vitals and nursing note reviewed.   Constitutional:       Appearance: She is toxic-appearing.   HENT:      Right Ear: There is no impacted cerumen.      Left Ear: There is no impacted cerumen.      Nose: No congestion.   Eyes:      General: No scleral icterus.        Right eye: No discharge.         Left eye: No discharge.   Cardiovascular:      Rate and Rhythm: Normal rate.      Heart sounds:     No gallop.   Pulmonary:      Effort: No respiratory distress.      Breath sounds: No wheezing or rales.   Abdominal:      General: There is no distension.      Tenderness: There is no abdominal tenderness. There is no guarding or rebound.   Musculoskeletal:         General: No tenderness, deformity or signs of injury.      Cervical back: Normal range of motion. No tenderness.   Neurological:      General: No focal deficit present.      Mental Status: She is alert and oriented to person, place, and time.      Cranial Nerves: No cranial nerve deficit.       Laboratory:  Recent Labs     01/11/23  1342   WBC 7.0   RBC 3.82*   HEMOGLOBIN 13.0   HEMATOCRIT 38.5   .8*   MCH 34.0*   MCHC  33.8   RDW 48.1   PLATELETCT 138*   MPV 9.8         No results for input(s): ALTSGPT, ASTSGOT, ALKPHOSPHAT, TBILIRUBIN, DBILIRUBIN, GAMMAGT, AMYLASE, LIPASE, ALB, PREALBUMIN, GLUCOSE in the last 72 hours.      No results for input(s): NTPROBNP in the last 72 hours.      No results for input(s): TROPONINT in the last 72 hours.    Imaging:  CT-HEAD W/O   Final Result      No acute intracranial abnormality.                      CT head : No acute intracranial abnormality    Assessment/Plan:  Justification for Admission Status  I anticipate this patient will require at least two midnights for appropriate medical management, necessitating inpatient admission because alcoihol withdraws and seizure     Patient will need a Telemetry bed on CARDIOLOGY service .  The need is secondary to alcohol withdraws and seizure .    * Alcohol abuse with withdrawal (HCC)- (present on admission)  Assessment & Plan  Patient drinks heavily daily last drink was today.  She also had a seizure episode today   Will start on detox bag  Also started CIWA protcol and also librium   Will monitor electrolytes and replete  Admit on telemetry     Seizure (HCC)- (present on admission)  Assessment & Plan  Patient presented to ER for a seizure episodes   patient was supposed to be on keppra as outpatient but was not taking   She was loaded in ER and will start back on her outpatient regimen of keppra  She also states that continues to drink and this seizure can be also related to drinking         VTE prophylaxis: SCDs/TEDs and enoxaparin ppx

## 2023-01-12 NOTE — CARE PLAN
The patient is Stable - Low risk of patient condition declining or worsening    Shift Goals  Clinical Goals: Monitor neuro status and CIWA Q4h4  Patient Goals: Rest and go home  Family Goals: n/a    Progress made toward(s) clinical / shift goals: Decreasing anxiety, no seizure activity     Patient is not progressing towards the following goals: Continue monitoring CIWA       Problem: Knowledge Deficit - Standard  Goal: Patient and family/care givers will demonstrate understanding of plan of care, disease process/condition, diagnostic tests and medications  Outcome: Progressing     Problem: Optimal Care for Alcohol Withdrawal  Goal: Optimal Care for the alcohol withdrawal patient  Outcome: Progressing     Problem: Seizure Precautions  Goal: Implementation of seizure precautions  Outcome: Progressing     Problem: Psychosocial  Goal: Patient's level of anxiety will decrease  Outcome: Progressing     Problem: Risk for Aspiration  Goal: Patient's risk for aspiration will be absent or decrease  Outcome: Progressing

## 2023-01-12 NOTE — ASSESSMENT & PLAN NOTE
Patient drinks heavily daily last drink was today.  She also had a seizure episode today   Will start on detox bag  Also started CIWA protcol and also librium   Will monitor electrolytes and replete  Admit on telemetry    You can access the FollowMyHealth Patient Portal offered by [facility] by registering at the following website: [email or web address]. By joining [facility]’s FollowMyHealth portal, you will also be able to view your health information using other applications (apps) compatible with our system.

## 2023-01-12 NOTE — ED NOTES
Pt given dry undies and pants as requested. Pt wobbly when standing up. 1 person assist.  Lab also states 2nd green top is again hemolyzed. Phleb called for draw. ERP aware.

## 2023-01-12 NOTE — ASSESSMENT & PLAN NOTE
Patient presented to ER for a seizure episodes   patient was supposed to be on keppra as outpatient but was not taking   She was loaded in ER and will start back on her outpatient regimen of keppra  She also states that continues to drink and this seizure can be also related to drinking

## 2023-01-12 NOTE — ED NOTES
Patient resting in bed with eyes closed and even respirations. Tremors noted when arms extended. CWA score recorded.

## 2023-01-12 NOTE — ED NOTES
Pt transported to T801 with ACLS with all belongings. Vitals stable for transport. PT on cardiac monitor.

## 2023-01-12 NOTE — PROGRESS NOTES
4 Eyes Skin Assessment Completed by CAROL Daly and CAROL Melton.    Head WDL  Ears WDL  Nose WDL  Mouth WDL  Neck WDL  Breast/Chest - 3rd nipple on R breast per pt, picture in chart   Shoulder Blades WDL  Spine WDL  (R) Arm/Elbow/Hand Redness and Edema infiltrated IV  (L) Arm/Elbow/Hand WDL  Abdomen WDL  Groin WDL  Scrotum/Coccyx/Buttocks WDL  (R) Leg WDL  (L) Leg WDL  (R) Heel/Foot/Toe WDL dry/cracked  (L) Heel/Foot/Toe WDL dry/cracked           Devices In Places Tele Box, Blood Pressure Cuff, and Pulse Ox      Interventions In Place Pillows    Possible Skin Injury No    Pictures Uploaded Into Epic Yes  Wound Consult Placed N/A  RN Wound Prevention Protocol Ordered No

## 2023-01-12 NOTE — PROGRESS NOTES
Pt stated that she wants to leave AMA. MD Mann notified and came to bedside to talk to patient. Pt removed from tele box. IV removed by patient herself. No bleeding. Belongings sent with patient.

## 2023-01-12 NOTE — ED PROVIDER NOTES
ED PROVIDER NOTE    Scribed for Mani Echols M.D. by Topher Gabriel. 1/11/2023, 6:01 PM.    This is an addendum to the note on Franchesca Ortez. For further details and full chart entry, see the previously signed ED Provider Note written by Dr. Gutierrez (ERP).      4:00 PM - I discussed the patient's case with Dr. Gutierrez (ERP) who will transfer care of the patient to me at this time.        4:31 PM - The patient's continuing management included patient will be hospitalized     Results for orders placed or performed during the hospital encounter of 01/11/23   Comp Metabolic Panel   Result Value Ref Range    Sodium 134 (L) 135 - 145 mmol/L    Potassium 4.1 3.6 - 5.5 mmol/L    Chloride 100 96 - 112 mmol/L    Co2 21 20 - 33 mmol/L    Anion Gap 13.0 7.0 - 16.0    Glucose 96 65 - 99 mg/dL    Bun 6 (L) 8 - 22 mg/dL    Creatinine 0.47 (L) 0.50 - 1.40 mg/dL    Calcium 9.4 8.5 - 10.5 mg/dL    AST(SGOT) 167 (H) 12 - 45 U/L    ALT(SGPT) 115 (H) 2 - 50 U/L    Alkaline Phosphatase 84 30 - 99 U/L    Total Bilirubin 0.6 0.1 - 1.5 mg/dL    Albumin 4.0 3.2 - 4.9 g/dL    Total Protein 7.1 6.0 - 8.2 g/dL    Globulin 3.1 1.9 - 3.5 g/dL    A-G Ratio 1.3 g/dL   MAGNESIUM   Result Value Ref Range    Magnesium 1.6 1.5 - 2.5 mg/dL   CMP reviewed as I was asked to do and it was unremarkable    Patient will be admitted per the plan of Dr. Gutierrez    FINAL IMPRESSION   1. Seizure (HCC)        2. Alcohol abuse        3. Noncompliance            ITopher (Yumiko), am scribing for, and in the presence of, Mani Echols M.D..    Electronically signed by: Topher Gabriel (Yumiko), 1/11/2023    Mani AVERY M.D. personally performed the services described in this documentation, as scribed by Topher Gabriel in my presence, and it is both accurate and complete.    The note accurately reflects work and decisions made by me.  Mani Echols M.D.  1/11/2023  11:44 PM

## 2023-01-13 NOTE — DISCHARGE SUMMARY
Discharge Summary    CHIEF COMPLAINT ON ADMISSION  Chief Complaint   Patient presents with    Seizure     Pt bib ems from bar. Had seizure lasted for 5 mins then caught in the chair per ems. +oral trauma. - head trauma. Has hx of seizure and non-compliant.       Reason for Admission  EMS     Admission Date  1/11/2023    CODE STATUS  Full    HPI & HOSPITAL COURSE  38 yo woman with depression and alcohol abuse who presented with witnessed seizure at a bar. She is followed by Dr. Raya with neurology for seizures since she had another seizure last summer but has not started her medication yet. CTH was unremarkable. She was started on keppra and CIWA. The next day she wanted to leave AMA. She was Aox4 and had capacity. She says she wanted to cut down her alcohol use at home. She has a prescription at home from her neurologist for her seizures already that she says she will start taking and would rather not have a keppra presription. She does not drive, does not have a drivers license. She decline information on alcohol programs.    Therefore, she is left AGAINST MEDICAL ADVICE in good and stable condition       Discharge Date  1/12/2023    FOLLOW UP ITEMS POST DISCHARGE  F/u with neurology    DISCHARGE DIAGNOSES  Principal Problem:    Alcohol abuse with withdrawal (HCC) POA: Yes  Active Problems:    Seizure (HCC) POA: Yes  Resolved Problems:    * No resolved hospital problems. *      FOLLOW UP  Future Appointments   Date Time Provider Department Center   1/26/2023  2:45 PM Eugenio MOLINA [address]   4/6/2023  3:00 PM Paco Raya M.D. [facility] None     No follow-up provider specified.    MEDICATIONS ON DISCHARGE     Medication List        ASK your doctor about these medications        Instructions   levETIRAcetam 500 MG Tabs  Commonly known as: KEPPRA   Doctor's comments: New medication for Franchesca.harmacy Consult please.Encourage compliance please  Take 1 Tablet by mouth 2 times a day.  Dose: 500 mg               Allergies  No Known Allergies    DIET  No orders of the defined types were placed in this encounter.        CONSULTATIONS  none    PROCEDURES  CT-HEAD W/O   Final Result      No acute intracranial abnormality.                        LABORATORY  Lab Results   Component Value Date    SODIUM 132 (L) 01/12/2023    POTASSIUM 3.8 01/12/2023    CHLORIDE 101 01/12/2023    CO2 20 01/12/2023    GLUCOSE 93 01/12/2023    BUN 7 (L) 01/12/2023    CREATININE 0.51 01/12/2023        Lab Results   Component Value Date    WBC 4.7 (L) 01/12/2023    HEMOGLOBIN 12.1 01/12/2023    HEMATOCRIT 35.0 (L) 01/12/2023    PLATELETCT 110 (L) 01/12/2023        Total time of the discharge process exceeds 32 minutes.

## 2023-04-08 ENCOUNTER — HOSPITAL ENCOUNTER (OUTPATIENT)
Dept: RADIOLOGY | Facility: MEDICAL CENTER | Age: 38
End: 2023-04-08
Attending: PSYCHIATRY & NEUROLOGY
Payer: COMMERCIAL

## 2023-04-08 DIAGNOSIS — G40.409 GRAND MAL SEIZURE DISORDER (HCC): ICD-10-CM

## 2023-04-08 PROCEDURE — 70551 MRI BRAIN STEM W/O DYE: CPT

## 2023-04-09 ENCOUNTER — DOCUMENTATION (OUTPATIENT)
Dept: NEUROLOGY | Facility: MEDICAL CENTER | Age: 38
End: 2023-04-09
Payer: COMMERCIAL

## 2023-04-09 DIAGNOSIS — R90.89 ABNORMAL BRAIN MRI: ICD-10-CM

## 2023-12-07 ENCOUNTER — APPOINTMENT (OUTPATIENT)
Dept: NEUROLOGY | Facility: MEDICAL CENTER | Age: 38
End: 2023-12-07
Attending: PSYCHIATRY & NEUROLOGY
Payer: COMMERCIAL

## 2023-12-09 DIAGNOSIS — G40.409 GRAND MAL SEIZURE DISORDER (HCC): ICD-10-CM

## 2023-12-11 RX ORDER — LEVETIRACETAM 500 MG/1
TABLET ORAL
Qty: 180 TABLET | Refills: 6 | Status: SHIPPED | OUTPATIENT
Start: 2023-12-11 | End: 2024-03-06 | Stop reason: SDUPTHER

## 2023-12-11 NOTE — TELEPHONE ENCOUNTER
Received request via: Pharmacy    Was the patient seen in the last year in this department? No   Date of last office visit 12/01/2022     Per last Neurology Office Visit, when was the date of next follow up visit set for?                            Date of office visit follow up request 3-4 months or so      Does the patient have an upcoming appointment? No   If yes, when?     Does the patient have an active prescription (recently filled or refills available) for medication(s) requested? No    Does the patient have correction Plus and need 100 day supply (blood pressure, diabetes and cholesterol meds only)? Patient does not have SCP

## 2024-03-06 DIAGNOSIS — G40.409 GRAND MAL SEIZURE DISORDER (HCC): ICD-10-CM

## 2024-03-07 ENCOUNTER — TELEPHONE (OUTPATIENT)
Dept: NEUROLOGY | Facility: MEDICAL CENTER | Age: 39
End: 2024-03-07
Payer: COMMERCIAL

## 2024-03-07 RX ORDER — LEVETIRACETAM 500 MG/1
TABLET ORAL
Qty: 180 TABLET | Refills: 6 | Status: SHIPPED | OUTPATIENT
Start: 2024-03-07 | End: 2024-03-19 | Stop reason: SDUPTHER

## 2024-03-07 NOTE — TELEPHONE ENCOUNTER
Received request via: Pharmacy    Medication Name/Dosage levETIRAcetam (KEPPRA) 500 MG Tab     When was medication last prescribed 12/11/23    How many refills were previously provided 6    How many Refills does he patient have left from last prescription 0    Was the patient seen in the last year in this department? No   Date of last office visit 12/01/2022     Per last Neurology Office Visit, when was the date of next follow up visit set for?                            Date of office visit follow up request 3-4 months      Does the patient have an upcoming appointment? Yes   If yes, when 08/15/2024             If no, schedule appointment N/A    Does the patient have long term Plus and need 100 day supply (blood pressure, diabetes and cholesterol meds only)? Patient does not have SCP

## 2024-03-07 NOTE — TELEPHONE ENCOUNTER
Received Refill PA request via [facility]  for levETIRAcetam (KEPPRA) 500 MG Tablet. (Quantity:60, Day Supply:30)     Insurance: [place] [employer]/ [place]  Member ID:  [id number]  BIN: [id number]  PCN: [id number]  Group: [id number]     Ran Test claim via [place] & medication Pays for a $0.00 copay. Will outreach to patient to offer specialty pharmacy services and or release to preferred pharmacy. MAX 34 DAY SUPPLY

## 2024-03-08 ENCOUNTER — TELEPHONE (OUTPATIENT)
Dept: NEUROLOGY | Facility: MEDICAL CENTER | Age: 39
End: 2024-03-08
Payer: COMMERCIAL

## 2024-03-19 DIAGNOSIS — G40.409 GRAND MAL SEIZURE DISORDER (HCC): ICD-10-CM

## 2024-03-20 ENCOUNTER — TELEPHONE (OUTPATIENT)
Dept: NEUROLOGY | Facility: MEDICAL CENTER | Age: 39
End: 2024-03-20
Payer: COMMERCIAL

## 2024-03-20 RX ORDER — LEVETIRACETAM 500 MG/1
TABLET ORAL
Qty: 180 TABLET | Refills: 6 | Status: SHIPPED | OUTPATIENT
Start: 2024-03-20 | End: 2025-03-06

## 2024-03-20 NOTE — TELEPHONE ENCOUNTER
Received Refill PA request via [facility]  for LEVETIRACETAM 500 MG TABLET. (Quantity:60, Day Supply:30)     Insurance: [employer]  Member ID:  [id number]  BIN: [id number]  PCN: [id number]  Group: [id number]     Ran Test claim via [place] & medication  REFILL TOO SOON UNTIL 03/20/2024. LAST FILL DATE 03/07/2024 AT [facility]. MAX 34 DAY SUPPLY

## 2024-04-08 ENCOUNTER — APPOINTMENT (OUTPATIENT)
Dept: RADIOLOGY | Facility: MEDICAL CENTER | Age: 39
End: 2024-04-08
Attending: PSYCHIATRY & NEUROLOGY

## 2024-06-14 ENCOUNTER — DOCUMENTATION (OUTPATIENT)
Dept: NEUROLOGY | Facility: MEDICAL CENTER | Age: 39
End: 2024-06-14
Payer: COMMERCIAL

## 2024-06-14 DIAGNOSIS — G40.409 GRAND MAL SEIZURE DISORDER (HCC): ICD-10-CM

## 2024-06-14 DIAGNOSIS — G40.409 GRAND MAL SEIZURE (HCC): ICD-10-CM

## 2024-07-17 ENCOUNTER — TELEPHONE (OUTPATIENT)
Dept: NEUROLOGY | Facility: MEDICAL CENTER | Age: 39
End: 2024-07-17
Payer: COMMERCIAL

## 2024-07-17 ENCOUNTER — DOCUMENTATION (OUTPATIENT)
Dept: NEUROLOGY | Facility: MEDICAL CENTER | Age: 39
End: 2024-07-17
Payer: COMMERCIAL

## 2024-07-17 DIAGNOSIS — G40.409 GRAND MAL SEIZURE DISORDER (HCC): ICD-10-CM

## 2024-07-17 RX ORDER — LEVETIRACETAM 500 MG/1
TABLET ORAL
Qty: 360 TABLET | Refills: 6 | Status: SHIPPED | OUTPATIENT
Start: 2024-07-17 | End: 2025-06-30

## 2024-08-16 ENCOUNTER — TELEPHONE (OUTPATIENT)
Dept: HEALTH INFORMATION MANAGEMENT | Facility: OTHER | Age: 39
End: 2024-08-16
Payer: COMMERCIAL

## 2024-09-03 ENCOUNTER — APPOINTMENT (OUTPATIENT)
Dept: OBGYN | Facility: CLINIC | Age: 39
End: 2024-09-03
Payer: COMMERCIAL

## 2024-09-05 ENCOUNTER — APPOINTMENT (OUTPATIENT)
Dept: OBGYN | Facility: CLINIC | Age: 39
End: 2024-09-05
Payer: COMMERCIAL

## 2024-09-11 DIAGNOSIS — G40.409 GRAND MAL SEIZURE DISORDER (HCC): ICD-10-CM

## 2024-09-11 NOTE — TELEPHONE ENCOUNTER
Please send in the new RX for this as the last one went to a pharmacy that she no longer uses    Thank you   Morgan

## 2024-09-12 RX ORDER — LEVETIRACETAM 500 MG/1
TABLET ORAL
Qty: 360 TABLET | Refills: 6 | Status: SHIPPED | OUTPATIENT
Start: 2024-09-12 | End: 2025-08-25

## 2024-10-23 ENCOUNTER — APPOINTMENT (OUTPATIENT)
Dept: RADIOLOGY | Facility: MEDICAL CENTER | Age: 39
End: 2024-10-23
Attending: PSYCHIATRY & NEUROLOGY
Payer: COMMERCIAL

## 2024-10-23 DIAGNOSIS — R90.89 ABNORMAL BRAIN MRI: ICD-10-CM

## 2024-10-23 PROCEDURE — 70553 MRI BRAIN STEM W/O & W/DYE: CPT

## 2024-10-23 PROCEDURE — A9579 GAD-BASE MR CONTRAST NOS,1ML: HCPCS | Mod: JZ,UD | Performed by: PSYCHIATRY & NEUROLOGY

## 2024-10-23 PROCEDURE — 700117 HCHG RX CONTRAST REV CODE 255: Mod: JZ,UD | Performed by: PSYCHIATRY & NEUROLOGY

## 2024-10-23 RX ADMIN — GADOTERIDOL 14 ML: 279.3 INJECTION, SOLUTION INTRAVENOUS at 09:32

## 2024-10-26 ENCOUNTER — PATIENT MESSAGE (OUTPATIENT)
Dept: NEUROLOGY | Facility: MEDICAL CENTER | Age: 39
End: 2024-10-26
Payer: COMMERCIAL

## 2024-11-07 NOTE — PATIENT COMMUNICATION
Please see below and advise     Is there any further treatment that can be done? Wish you all well.

## 2024-12-17 ENCOUNTER — HOSPITAL ENCOUNTER (EMERGENCY)
Facility: MEDICAL CENTER | Age: 39
End: 2024-12-18
Attending: STUDENT IN AN ORGANIZED HEALTH CARE EDUCATION/TRAINING PROGRAM
Payer: COMMERCIAL

## 2024-12-17 ENCOUNTER — APPOINTMENT (OUTPATIENT)
Dept: RADIOLOGY | Facility: MEDICAL CENTER | Age: 39
End: 2024-12-17
Attending: STUDENT IN AN ORGANIZED HEALTH CARE EDUCATION/TRAINING PROGRAM
Payer: COMMERCIAL

## 2024-12-17 DIAGNOSIS — G40.909 SEIZURE DISORDER (HCC): ICD-10-CM

## 2024-12-17 DIAGNOSIS — F10.920 ALCOHOLIC INTOXICATION WITHOUT COMPLICATION (HCC): ICD-10-CM

## 2024-12-17 LAB
ALBUMIN SERPL BCP-MCNC: 4.4 G/DL (ref 3.2–4.9)
ALBUMIN/GLOB SERPL: 1.4 G/DL
ALP SERPL-CCNC: 60 U/L (ref 30–99)
ALT SERPL-CCNC: 61 U/L (ref 2–50)
ANION GAP SERPL CALC-SCNC: 20 MMOL/L (ref 7–16)
AST SERPL-CCNC: 92 U/L (ref 12–45)
BASOPHILS # BLD AUTO: 1.7 % (ref 0–1.8)
BASOPHILS # BLD: 0.1 K/UL (ref 0–0.12)
BILIRUB SERPL-MCNC: 0.3 MG/DL (ref 0.1–1.5)
BUN SERPL-MCNC: 14 MG/DL (ref 8–22)
CALCIUM ALBUM COR SERPL-MCNC: 8.4 MG/DL (ref 8.5–10.5)
CALCIUM SERPL-MCNC: 8.7 MG/DL (ref 8.5–10.5)
CHLORIDE SERPL-SCNC: 104 MMOL/L (ref 96–112)
CO2 SERPL-SCNC: 19 MMOL/L (ref 20–33)
CREAT SERPL-MCNC: 0.41 MG/DL (ref 0.5–1.4)
EOSINOPHIL # BLD AUTO: 0.19 K/UL (ref 0–0.51)
EOSINOPHIL NFR BLD: 3.1 % (ref 0–6.9)
ERYTHROCYTE [DISTWIDTH] IN BLOOD BY AUTOMATED COUNT: 50 FL (ref 35.9–50)
ETHANOL BLD-MCNC: 433.1 MG/DL
GFR SERPLBLD CREATININE-BSD FMLA CKD-EPI: 129 ML/MIN/1.73 M 2
GLOBULIN SER CALC-MCNC: 3.1 G/DL (ref 1.9–3.5)
GLUCOSE SERPL-MCNC: 109 MG/DL (ref 65–99)
HCG SERPL QL: NEGATIVE
HCT VFR BLD AUTO: 38.3 % (ref 37–47)
HGB BLD-MCNC: 13.6 G/DL (ref 12–16)
IMM GRANULOCYTES # BLD AUTO: 0.03 K/UL (ref 0–0.11)
IMM GRANULOCYTES NFR BLD AUTO: 0.5 % (ref 0–0.9)
LYMPHOCYTES # BLD AUTO: 2.6 K/UL (ref 1–4.8)
LYMPHOCYTES NFR BLD: 42.9 % (ref 22–41)
MCH RBC QN AUTO: 36.7 PG (ref 27–33)
MCHC RBC AUTO-ENTMCNC: 35.5 G/DL (ref 32.2–35.5)
MCV RBC AUTO: 103.2 FL (ref 81.4–97.8)
MONOCYTES # BLD AUTO: 0.59 K/UL (ref 0–0.85)
MONOCYTES NFR BLD AUTO: 9.7 % (ref 0–13.4)
NEUTROPHILS # BLD AUTO: 2.55 K/UL (ref 1.82–7.42)
NEUTROPHILS NFR BLD: 42.1 % (ref 44–72)
NRBC # BLD AUTO: 0 K/UL
NRBC BLD-RTO: 0 /100 WBC (ref 0–0.2)
PLATELET # BLD AUTO: 237 K/UL (ref 164–446)
PMV BLD AUTO: 8.9 FL (ref 9–12.9)
POTASSIUM SERPL-SCNC: 4.7 MMOL/L (ref 3.6–5.5)
PROT SERPL-MCNC: 7.5 G/DL (ref 6–8.2)
RBC # BLD AUTO: 3.71 M/UL (ref 4.2–5.4)
SODIUM SERPL-SCNC: 143 MMOL/L (ref 135–145)
TROPONIN T SERPL-MCNC: <6 NG/L (ref 6–19)
WBC # BLD AUTO: 6.1 K/UL (ref 4.8–10.8)

## 2024-12-17 PROCEDURE — 82077 ASSAY SPEC XCP UR&BREATH IA: CPT

## 2024-12-17 PROCEDURE — 70450 CT HEAD/BRAIN W/O DYE: CPT

## 2024-12-17 PROCEDURE — 85025 COMPLETE CBC W/AUTO DIFF WBC: CPT

## 2024-12-17 PROCEDURE — 84703 CHORIONIC GONADOTROPIN ASSAY: CPT

## 2024-12-17 PROCEDURE — 99285 EMERGENCY DEPT VISIT HI MDM: CPT | Mod: 25

## 2024-12-17 PROCEDURE — 84484 ASSAY OF TROPONIN QUANT: CPT

## 2024-12-17 PROCEDURE — 80053 COMPREHEN METABOLIC PANEL: CPT

## 2024-12-17 PROCEDURE — 36415 COLL VENOUS BLD VENIPUNCTURE: CPT

## 2024-12-17 RX ORDER — LEVETIRACETAM 500 MG/5ML
1500 INJECTION, SOLUTION, CONCENTRATE INTRAVENOUS ONCE
Status: DISCONTINUED | OUTPATIENT
Start: 2024-12-17 | End: 2024-12-18 | Stop reason: HOSPADM

## 2024-12-17 RX ORDER — LEVETIRACETAM 500 MG/1
2000 TABLET ORAL 2 TIMES DAILY
COMMUNITY

## 2024-12-17 ASSESSMENT — PAIN DESCRIPTION - PAIN TYPE: TYPE: ACUTE PAIN

## 2024-12-18 VITALS
TEMPERATURE: 98.2 F | WEIGHT: 140 LBS | DIASTOLIC BLOOD PRESSURE: 70 MMHG | SYSTOLIC BLOOD PRESSURE: 118 MMHG | HEIGHT: 65 IN | OXYGEN SATURATION: 91 % | RESPIRATION RATE: 18 BRPM | BODY MASS INDEX: 23.32 KG/M2 | HEART RATE: 73 BPM

## 2024-12-18 NOTE — ED PROVIDER NOTES
ED Provider Note    CHIEF COMPLAINT  Chief Complaint   Patient presents with    Seizure     Brought in by [facility]sa from home. C/o Seizure 4 times within the last 24 hrs.pain on the right side of the body. Abrasions on left cheek bone noted. Hx of epilepsy? + etoh. Last drink 3 hrs ago. Alert and oriented.       EXTERNAL RECORDS REVIEWED  Previous admission in 2023 for seizures and alcohol use    HPI/ROS  LIMITATION TO HISTORY   None  OUTSIDE HISTORIAN(S):  None    Franchesca Ortez is a 39 y.o. female who presents evaluation of seizures.  Patient says that she has a history of epilepsy.  She is followed closely by Neurology, Dr. Raya and takes Keppra daily.  She reports compliance with her medications.  She said that the past 24 hours she thinks she has had about 4 seizures.  She says that she had childhood epilepsy and then it became worse again in the past few years.  She thinks that she may have had some falls associated with the seizures.  She has been drinking alcohol, last drink about 3 hours ago.  No withdrawals.  She denies any head injury.  No fevers, neck stiffness, upper respiratory symptoms.  No urinary symptoms.    She follows with Dr. Raya, Neurology    PAST MEDICAL HISTORY   has a past medical history of Alcohol abuse, Depression, History of suicide attempt, and Seizure (HCC).    SURGICAL HISTORY   has a past surgical history that includes primary c section.    FAMILY HISTORY  History reviewed. No pertinent family history.    SOCIAL HISTORY  Social History     Tobacco Use    Smoking status: Every Day     Types: Cigarettes    Smokeless tobacco: Never   Substance and Sexual Activity    Alcohol use: Yes    Drug use: Never    Sexual activity: Not on file       CURRENT MEDICATIONS  Home Medications       Reviewed by Vishal Girard R.N. (Registered Nurse) on 12/17/24 at 2242  Med List Status: Partial     Medication Last Dose Status   levETIRAcetam (KEPPRA) 500 MG Tab  Active                  Audit from Redirected  "Encounters    **Home medications have not yet been reviewed for this encounter**         ALLERGIES  No Known Allergies    PHYSICAL EXAM  VITAL SIGNS: /84   Pulse 92   Temp 36.3 °C (97.3 °F) (Temporal)   Resp 16   Ht 1.651 m (5' 5\")   Wt 63.5 kg (140 lb)   SpO2 92%   BMI 23.30 kg/m²    Constitutional: Awake and alert Non toxic  HENT: She has an abrasion to left cheek. Moist mucous membranes  Eyes: Normal inspection  Neck: Grossly normal range of motion.  Cardiovascular: Normal heart rate, Normal rhythm.  Symmetric peripheral pulses.   Thorax & Lungs: No respiratory distress, No wheezing, No rales, No rhonchi, No chest tenderness.   Abdomen: Soft, non-distended, nontender to palpation in all 4 quadrants, no mass  Skin: No obvious rash.  Extremities: Warm, well perfused. No clubbing, cyanosis, edema   Neurologic: Grossly normal   Psychiatric: Normal for situation      EKG/LABS  Results for orders placed or performed during the hospital encounter of 12/17/24   CBC WITH DIFFERENTIAL    Collection Time: 12/17/24  9:50 PM   Result Value Ref Range    WBC 6.1 4.8 - 10.8 K/uL    RBC 3.71 (L) 4.20 - 5.40 M/uL    Hemoglobin 13.6 12.0 - 16.0 g/dL    Hematocrit 38.3 37.0 - 47.0 %    .2 (H) 81.4 - 97.8 fL    MCH 36.7 (H) 27.0 - 33.0 pg    MCHC 35.5 32.2 - 35.5 g/dL    RDW 50.0 35.9 - 50.0 fL    Platelet Count 237 164 - 446 K/uL    MPV 8.9 (L) 9.0 - 12.9 fL    Neutrophils-Polys 42.10 (L) 44.00 - 72.00 %    Lymphocytes 42.90 (H) 22.00 - 41.00 %    Monocytes 9.70 0.00 - 13.40 %    Eosinophils 3.10 0.00 - 6.90 %    Basophils 1.70 0.00 - 1.80 %    Immature Granulocytes 0.50 0.00 - 0.90 %    Nucleated RBC 0.00 0.00 - 0.20 /100 WBC    Neutrophils (Absolute) 2.55 1.82 - 7.42 K/uL    Lymphs (Absolute) 2.60 1.00 - 4.80 K/uL    Monos (Absolute) 0.59 0.00 - 0.85 K/uL    Eos (Absolute) 0.19 0.00 - 0.51 K/uL    Baso (Absolute) 0.10 0.00 - 0.12 K/uL    Immature Granulocytes (abs) 0.03 0.00 - 0.11 K/uL    NRBC (Absolute) 0.00 " K/uL   COMP METABOLIC PANEL    Collection Time: 12/17/24  9:50 PM   Result Value Ref Range    Sodium 143 135 - 145 mmol/L    Potassium 4.7 3.6 - 5.5 mmol/L    Chloride 104 96 - 112 mmol/L    Co2 19 (L) 20 - 33 mmol/L    Anion Gap 20.0 (H) 7.0 - 16.0    Glucose 109 (H) 65 - 99 mg/dL    Bun 14 8 - 22 mg/dL    Creatinine 0.41 (L) 0.50 - 1.40 mg/dL    Calcium 8.7 8.5 - 10.5 mg/dL    Correct Calcium 8.4 (L) 8.5 - 10.5 mg/dL    AST(SGOT) 92 (H) 12 - 45 U/L    ALT(SGPT) 61 (H) 2 - 50 U/L    Alkaline Phosphatase 60 30 - 99 U/L    Total Bilirubin 0.3 0.1 - 1.5 mg/dL    Albumin 4.4 3.2 - 4.9 g/dL    Total Protein 7.5 6.0 - 8.2 g/dL    Globulin 3.1 1.9 - 3.5 g/dL    A-G Ratio 1.4 g/dL   TROPONIN    Collection Time: 12/17/24  9:50 PM   Result Value Ref Range    Troponin T <6 6 - 19 ng/L   HCG QUAL SERUM    Collection Time: 12/17/24  9:50 PM   Result Value Ref Range    Beta-Hcg Qualitative Serum Negative Negative   DIAGNOSTIC ALCOHOL    Collection Time: 12/17/24  9:50 PM   Result Value Ref Range    Diagnostic Alcohol 433.1 (HH) <10.1 mg/dL   ESTIMATED GFR    Collection Time: 12/17/24  9:50 PM   Result Value Ref Range    GFR (CKD-EPI) 129 >60 mL/min/1.73 m 2       I have independently interpreted this EKG    RADIOLOGY/PROCEDURES   I have independently interpreted the diagnostic imaging associated with this visit and am waiting the final reading from the radiologist.   My preliminary interpretation is as follows: No large ICH    Radiologist interpretation:  CT-HEAD W/O   Final Result         1.  No acute intracranial abnormality.                   COURSE & MEDICAL DECISION MAKING    ASSESSMENT, COURSE AND PLAN  Care Narrative: This is a 39-year-old with seizure disorder who presents for evaluation after multiple seizures the last day.  Patient arrives neurologically intact with a GCS of 15 and is not postictal.  She has an abrasion to the left cheek.  She does endorse drinking alcohol but has no signs or symptoms of alcohol  withdrawals.  Patient's labs are notable for an anion gap acidosis likely in the setting of alcohol use.  Additionally her LFTs are elevated also likely from alcoholic liver disease.  She is not pregnant.  Her troponin is normal.  She is not anemic and has no significant electrolyte derangements.  Her blood alcohol level is significantly elevated over 400.  CT shows no evidence of bleed or mass.  Keppra load was ordered however patient is refusing.  Unclear exactly why patient is having breakthrough seizures, considered medication non compliance.  She has no signs to suggest an underlying infectious trigger.  She is nontoxic, no meningismus and has no signs to suggest a CNS infection.  Currently pending urine to evaluate for UTI.  She follows closely with her outpatient neurologist Dr. Raya and she will need to follow-up to discuss further medication management or dose adjustments.    Patient has been sleeping comfortably but remains intoxicated.  My colleague, Dr. Rodriguez  will reevaluate the patient once clinically sober.  I do anticipate that she will be able to discharge home once sober and follow-up closely as an outpatient.  She has been observed in the ER for several hours already and has not had any seizure-like activity.  Patient does not drive and is aware that she is prohibited from driving.    ED OBS: Yes; I am placing the patient in to an observation status due to a diagnostic uncertainty as well as therapeutic intensity. Patient placed in observation status at 23.00PM, 12/17/2024.     Observation plan is as follows: sober reevaluation              DISPOSITION AND DISCUSSIONS  I have discussed management of the patient with the following physicians and LISET's:  Dr. Rodriguez    Discussion of management with other [facility] or appropriate source(s): None       Decision tools and prescription drugs considered including, but not limited to: Medication modification will defer to outpatient neurologist .    FINAL  DIAGNOSIS  1. Seizure disorder (HCC) Acute   2. Alcoholic intoxication without complication (HCC) Acute        Electronically signed by: Lynne Abarca M.D., 12/17/2024 11:00 PM

## 2024-12-18 NOTE — ED NOTES
Hourly round completed, patient resting with unlabored respirations. Face visible, No current needs identified.Gurney in low position, side rail up for pt safety.

## 2024-12-18 NOTE — ED NOTES
Lab called with critical result of 433.1 at 0000. Critical lab result read back to lab.   Dr. Abarca notified of critical lab result at 0003.  Critical lab result read back by Dr. Abarca.

## 2024-12-18 NOTE — DISCHARGE SUMMARY
"  ED Observation Discharge Summary    Patient:Franchesca Ortez  Patient : 1985  Patient MRN: [id number]  Patient PCP: Pcp Pt States None    Admit Date: 2024  Discharge Date and Time: 24 7:01 AM  Discharge Diagnosis: Alcohol intoxication and seizure-like activity  Discharge Attending: Dexter Cuellar M.D.  Discharge Service: ED Observation    ED Course  Franchesca is a 39 y.o. female who was evaluated at [facility] initially for seizure-like activity.  Patient noted to be intoxicated.  Patient now more sober and asking for discharge.  Low suspicion for withdrawal seizure.    Discharge Exam:  /84   Pulse 92   Temp 36.3 °C (97.3 °F) (Temporal)   Resp 16   Ht 1.651 m (5' 5\")   Wt 63.5 kg (140 lb)   SpO2 92%   BMI 23.30 kg/m² .    Constitutional: Awake and alert. Nontoxic  HENT:  Grossly normal  Eyes: Grossly normal  Neck: Normal range of motion  Cardiovascular: Normal heart rate   Thorax & Lungs: No respiratory distress  Abdomen: Nontender  Skin:  No pathologic rash.   Extremities: Well perfused  Psychiatric: Affect normal    Labs  Results for orders placed or performed during the hospital encounter of 24   CBC WITH DIFFERENTIAL    Collection Time: 24  9:50 PM   Result Value Ref Range    WBC 6.1 4.8 - 10.8 K/uL    RBC 3.71 (L) 4.20 - 5.40 M/uL    Hemoglobin 13.6 12.0 - 16.0 g/dL    Hematocrit 38.3 37.0 - 47.0 %    .2 (H) 81.4 - 97.8 fL    MCH 36.7 (H) 27.0 - 33.0 pg    MCHC 35.5 32.2 - 35.5 g/dL    RDW 50.0 35.9 - 50.0 fL    Platelet Count 237 164 - 446 K/uL    MPV 8.9 (L) 9.0 - 12.9 fL    Neutrophils-Polys 42.10 (L) 44.00 - 72.00 %    Lymphocytes 42.90 (H) 22.00 - 41.00 %    Monocytes 9.70 0.00 - 13.40 %    Eosinophils 3.10 0.00 - 6.90 %    Basophils 1.70 0.00 - 1.80 %    Immature Granulocytes 0.50 0.00 - 0.90 %    Nucleated RBC 0.00 0.00 - 0.20 /100 WBC    Neutrophils (Absolute) 2.55 1.82 - 7.42 K/uL    Lymphs (Absolute) 2.60 1.00 - 4.80 K/uL    Monos (Absolute) 0.59 0.00 - 0.85 K/uL "    Eos (Absolute) 0.19 0.00 - 0.51 K/uL    Baso (Absolute) 0.10 0.00 - 0.12 K/uL    Immature Granulocytes (abs) 0.03 0.00 - 0.11 K/uL    NRBC (Absolute) 0.00 K/uL   COMP METABOLIC PANEL    Collection Time: 12/17/24  9:50 PM   Result Value Ref Range    Sodium 143 135 - 145 mmol/L    Potassium 4.7 3.6 - 5.5 mmol/L    Chloride 104 96 - 112 mmol/L    Co2 19 (L) 20 - 33 mmol/L    Anion Gap 20.0 (H) 7.0 - 16.0    Glucose 109 (H) 65 - 99 mg/dL    Bun 14 8 - 22 mg/dL    Creatinine 0.41 (L) 0.50 - 1.40 mg/dL    Calcium 8.7 8.5 - 10.5 mg/dL    Correct Calcium 8.4 (L) 8.5 - 10.5 mg/dL    AST(SGOT) 92 (H) 12 - 45 U/L    ALT(SGPT) 61 (H) 2 - 50 U/L    Alkaline Phosphatase 60 30 - 99 U/L    Total Bilirubin 0.3 0.1 - 1.5 mg/dL    Albumin 4.4 3.2 - 4.9 g/dL    Total Protein 7.5 6.0 - 8.2 g/dL    Globulin 3.1 1.9 - 3.5 g/dL    A-G Ratio 1.4 g/dL   TROPONIN    Collection Time: 12/17/24  9:50 PM   Result Value Ref Range    Troponin T <6 6 - 19 ng/L   HCG QUAL SERUM    Collection Time: 12/17/24  9:50 PM   Result Value Ref Range    Beta-Hcg Qualitative Serum Negative Negative   DIAGNOSTIC ALCOHOL    Collection Time: 12/17/24  9:50 PM   Result Value Ref Range    Diagnostic Alcohol 433.1 (HH) <10.1 mg/dL   ESTIMATED GFR    Collection Time: 12/17/24  9:50 PM   Result Value Ref Range    GFR (CKD-EPI) 129 >60 mL/min/1.73 m 2       Radiology  CT-HEAD W/O   Final Result         1.  No acute intracranial abnormality.                   Medications:   New Prescriptions    No medications on file       My final assessment includes seizure-like activity and alcohol intoxication  Upon Reevaluation, the patient's condition has: Improved; and will be discharged.    Patient discharged from ED Observation status at 0700 (Time) 12/18/2024 (Date).     Total time spent on this ED Observation discharge encounter is < 30 Minutes    Electronically signed by: Dexter Cuellar M.D., 12/18/2024 7:01 AM

## 2024-12-18 NOTE — ED TRIAGE NOTES
Franchesca Pérez  Age  female  Chief Complaint   Patient presents with    Seizure     Brought in by chelsie from home. C/o Seizure 4 times within the last 24 hrs.pain on the right side of the body. Abrasions on left cheek bone noted. Hx of epilepsy? + etoh. Last drink 3 hrs ago. Alert and oriented.

## 2024-12-27 ENCOUNTER — HOSPITAL ENCOUNTER (EMERGENCY)
Facility: MEDICAL CENTER | Age: 39
End: 2024-12-27
Attending: EMERGENCY MEDICINE
Payer: COMMERCIAL

## 2024-12-27 VITALS
DIASTOLIC BLOOD PRESSURE: 72 MMHG | WEIGHT: 140 LBS | SYSTOLIC BLOOD PRESSURE: 117 MMHG | HEART RATE: 78 BPM | BODY MASS INDEX: 23.32 KG/M2 | HEIGHT: 65 IN | TEMPERATURE: 97.2 F | RESPIRATION RATE: 20 BRPM | OXYGEN SATURATION: 94 %

## 2024-12-27 DIAGNOSIS — F10.930 ALCOHOL WITHDRAWAL SYNDROME WITHOUT COMPLICATION (HCC): ICD-10-CM

## 2024-12-27 LAB
ALBUMIN SERPL BCP-MCNC: 3.8 G/DL (ref 3.2–4.9)
ALBUMIN/GLOB SERPL: 1.2 G/DL
ALP SERPL-CCNC: 75 U/L (ref 30–99)
ALT SERPL-CCNC: 40 U/L (ref 2–50)
ANION GAP SERPL CALC-SCNC: 17 MMOL/L (ref 7–16)
AST SERPL-CCNC: 76 U/L (ref 12–45)
BASOPHILS # BLD AUTO: 0.6 % (ref 0–1.8)
BASOPHILS # BLD: 0.03 K/UL (ref 0–0.12)
BILIRUB SERPL-MCNC: 0.8 MG/DL (ref 0.1–1.5)
BUN SERPL-MCNC: 11 MG/DL (ref 8–22)
CALCIUM ALBUM COR SERPL-MCNC: 9.3 MG/DL (ref 8.5–10.5)
CALCIUM SERPL-MCNC: 9.1 MG/DL (ref 8.5–10.5)
CHLORIDE SERPL-SCNC: 99 MMOL/L (ref 96–112)
CO2 SERPL-SCNC: 20 MMOL/L (ref 20–33)
CREAT SERPL-MCNC: 0.55 MG/DL (ref 0.5–1.4)
EOSINOPHIL # BLD AUTO: 0.05 K/UL (ref 0–0.51)
EOSINOPHIL NFR BLD: 0.9 % (ref 0–6.9)
ERYTHROCYTE [DISTWIDTH] IN BLOOD BY AUTOMATED COUNT: 44.8 FL (ref 35.9–50)
GFR SERPLBLD CREATININE-BSD FMLA CKD-EPI: 120 ML/MIN/1.73 M 2
GLOBULIN SER CALC-MCNC: 3.1 G/DL (ref 1.9–3.5)
GLUCOSE SERPL-MCNC: 119 MG/DL (ref 65–99)
HCT VFR BLD AUTO: 33 % (ref 37–47)
HGB BLD-MCNC: 11.8 G/DL (ref 12–16)
IMM GRANULOCYTES # BLD AUTO: 0.05 K/UL (ref 0–0.11)
IMM GRANULOCYTES NFR BLD AUTO: 0.9 % (ref 0–0.9)
LYMPHOCYTES # BLD AUTO: 1.26 K/UL (ref 1–4.8)
LYMPHOCYTES NFR BLD: 23.6 % (ref 22–41)
MCH RBC QN AUTO: 35.4 PG (ref 27–33)
MCHC RBC AUTO-ENTMCNC: 35.8 G/DL (ref 32.2–35.5)
MCV RBC AUTO: 99.1 FL (ref 81.4–97.8)
MONOCYTES # BLD AUTO: 0.54 K/UL (ref 0–0.85)
MONOCYTES NFR BLD AUTO: 10.1 % (ref 0–13.4)
NEUTROPHILS # BLD AUTO: 3.41 K/UL (ref 1.82–7.42)
NEUTROPHILS NFR BLD: 63.9 % (ref 44–72)
NRBC # BLD AUTO: 0 K/UL
NRBC BLD-RTO: 0 /100 WBC (ref 0–0.2)
PLATELET # BLD AUTO: 97 K/UL (ref 164–446)
PLATELETS.RETICULATED NFR BLD AUTO: 3.6 % (ref 0.6–13.1)
PMV BLD AUTO: 9.8 FL (ref 9–12.9)
POTASSIUM SERPL-SCNC: 3.3 MMOL/L (ref 3.6–5.5)
PROT SERPL-MCNC: 6.9 G/DL (ref 6–8.2)
RBC # BLD AUTO: 3.33 M/UL (ref 4.2–5.4)
SODIUM SERPL-SCNC: 136 MMOL/L (ref 135–145)
WBC # BLD AUTO: 5.3 K/UL (ref 4.8–10.8)

## 2024-12-27 PROCEDURE — 85055 RETICULATED PLATELET ASSAY: CPT

## 2024-12-27 PROCEDURE — 96374 THER/PROPH/DIAG INJ IV PUSH: CPT

## 2024-12-27 PROCEDURE — 700111 HCHG RX REV CODE 636 W/ 250 OVERRIDE (IP): Mod: UD | Performed by: EMERGENCY MEDICINE

## 2024-12-27 PROCEDURE — 80053 COMPREHEN METABOLIC PANEL: CPT

## 2024-12-27 PROCEDURE — 99284 EMERGENCY DEPT VISIT MOD MDM: CPT

## 2024-12-27 PROCEDURE — 36415 COLL VENOUS BLD VENIPUNCTURE: CPT

## 2024-12-27 PROCEDURE — 85025 COMPLETE CBC W/AUTO DIFF WBC: CPT

## 2024-12-27 RX ORDER — LORAZEPAM 2 MG/ML
2 INJECTION INTRAMUSCULAR ONCE
Status: COMPLETED | OUTPATIENT
Start: 2024-12-27 | End: 2024-12-27

## 2024-12-27 RX ADMIN — LORAZEPAM 2 MG: 2 INJECTION INTRAMUSCULAR; INTRAVENOUS at 07:07

## 2024-12-27 ASSESSMENT — LIFESTYLE VARIABLES
TREMOR: MODERATE TREMOR WITH ARMS EXTENDED
TOTAL SCORE: 9
PAROXYSMAL SWEATS: BARELY PERCEPTIBLE SWEATING. PALMS MOIST
ORIENTATION AND CLOUDING OF SENSORIUM: ORIENTED AND CAN DO SERIAL ADDITIONS
NAUSEA AND VOMITING: *
HEADACHE, FULLNESS IN HEAD: VERY MILD
VISUAL DISTURBANCES: NOT PRESENT
AGITATION: NORMAL ACTIVITY
ANXIETY: MILDLY ANXIOUS
AUDITORY DISTURBANCES: NOT PRESENT

## 2024-12-27 ASSESSMENT — FIBROSIS 4 INDEX: FIB4 SCORE: 1.89

## 2024-12-27 ASSESSMENT — PAIN DESCRIPTION - PAIN TYPE: TYPE: ACUTE PAIN

## 2024-12-27 NOTE — ED PROVIDER NOTES
ED Provider Note    CHIEF COMPLAINT  Chief Complaint   Patient presents with    ETOH Withdrawal       EXTERNAL RECORDS REVIEWED  Outpatient Notes patient's outpatient notes with her neurologist Dr. Raya, patient on Keppra twice daily.  Seizures well-controlled when patient takes her medications.    HPI/ROS  LIMITATION TO HISTORY   Select: : None      Franchesca Ortez is a 39 y.o. female who presents with chief complaint of possible alcohol withdrawal.  Patient has a history of seizure disorder, psychiatric disorder, and alcohol dependence.  Patient reports that she has been on a binge but stopped drinking approximately 36 hours ago.  She reports that since that time she has developed a tremor, and agitation similar to prior episodes of alcohol withdrawal.  She is hoping to go to a rehabilitation center as she does want to quit.  Patient denies any associated nausea or vomiting.  She denies any black or bloody stool.  She denies any abdominal pain.  Patient has a history of seizures but is taking her medications as directed.     PAST MEDICAL HISTORY   has a past medical history of Alcohol abuse, Depression, History of suicide attempt, and Seizure (HCC).    SURGICAL HISTORY   has a past surgical history that includes gyn surgery and primary c section.    FAMILY HISTORY  Family History   Problem Relation Age of Onset    Hypertension Father     Diabetes Father     Cancer Maternal Grandmother         liver       SOCIAL HISTORY  Social History     Tobacco Use    Smoking status: Every Day     Current packs/day: 0.50     Average packs/day: 0.5 packs/day for 15.0 years (7.5 ttl pk-yrs)     Types: Cigarettes    Smokeless tobacco: Never    Tobacco comments:     3/4 pk a day   Vaping Use    Vaping status: Never Used   Substance and Sexual Activity    Alcohol use: Yes     Comment: 1 pint a day vodka    Drug use: Yes     Types: Inhaled     Comment: marijuana    Sexual activity: Yes     Partners: Male     Birth  "control/protection: I.U.D.       CURRENT MEDICATIONS  Home Medications    **Home medications have not yet been reviewed for this encounter**         ALLERGIES  No Known Allergies    PHYSICAL EXAM  VITAL SIGNS: /76   Pulse 81   Temp 36.2 °C (97.2 °F) (Temporal)   Resp 18   Ht 1.651 m (5' 5\")   Wt 63.5 kg (140 lb)   SpO2 97%   BMI 23.30 kg/m²    Physical Exam  Constitutional:       Appearance: She is well-developed.   HENT:      Head: Normocephalic and atraumatic.   Eyes:      Pupils: Pupils are equal, round, and reactive to light.   Cardiovascular:      Rate and Rhythm: Normal rate and regular rhythm.   Pulmonary:      Effort: Pulmonary effort is normal. No accessory muscle usage or respiratory distress.      Breath sounds: Normal breath sounds.   Abdominal:      General: Bowel sounds are normal.      Palpations: Abdomen is soft. There is no mass.      Tenderness: There is no abdominal tenderness.   Musculoskeletal:         General: Normal range of motion.   Skin:     General: Skin is warm.      Capillary Refill: Capillary refill takes less than 2 seconds.   Neurological:      Mental Status: She is alert.      Comments: High-frequency low amplitude tremor   Psychiatric:         Mood and Affect: Mood is not anxious.      Comments: Mildly anxious           EKG/LABS  Results for orders placed or performed during the hospital encounter of 12/27/24   CBC WITH DIFFERENTIAL    Collection Time: 12/27/24  6:55 AM   Result Value Ref Range    WBC 5.3 4.8 - 10.8 K/uL    RBC 3.33 (L) 4.20 - 5.40 M/uL    Hemoglobin 11.8 (L) 12.0 - 16.0 g/dL    Hematocrit 33.0 (L) 37.0 - 47.0 %    MCV 99.1 (H) 81.4 - 97.8 fL    MCH 35.4 (H) 27.0 - 33.0 pg    MCHC 35.8 (H) 32.2 - 35.5 g/dL    RDW 44.8 35.9 - 50.0 fL    Platelet Count 97 (L) 164 - 446 K/uL    MPV 9.8 9.0 - 12.9 fL    Neutrophils-Polys 63.90 44.00 - 72.00 %    Lymphocytes 23.60 22.00 - 41.00 %    Monocytes 10.10 0.00 - 13.40 %    Eosinophils 0.90 0.00 - 6.90 %    " Basophils 0.60 0.00 - 1.80 %    Immature Granulocytes 0.90 0.00 - 0.90 %    Nucleated RBC 0.00 0.00 - 0.20 /100 WBC    Neutrophils (Absolute) 3.41 1.82 - 7.42 K/uL    Lymphs (Absolute) 1.26 1.00 - 4.80 K/uL    Monos (Absolute) 0.54 0.00 - 0.85 K/uL    Eos (Absolute) 0.05 0.00 - 0.51 K/uL    Baso (Absolute) 0.03 0.00 - 0.12 K/uL    Immature Granulocytes (abs) 0.05 0.00 - 0.11 K/uL    NRBC (Absolute) 0.00 K/uL   CMP    Collection Time: 12/27/24  6:55 AM   Result Value Ref Range    Sodium 136 135 - 145 mmol/L    Potassium 3.3 (L) 3.6 - 5.5 mmol/L    Chloride 99 96 - 112 mmol/L    Co2 20 20 - 33 mmol/L    Anion Gap 17.0 (H) 7.0 - 16.0    Glucose 119 (H) 65 - 99 mg/dL    Bun 11 8 - 22 mg/dL    Creatinine 0.55 0.50 - 1.40 mg/dL    Calcium 9.1 8.5 - 10.5 mg/dL    Correct Calcium 9.3 8.5 - 10.5 mg/dL    AST(SGOT) 76 (H) 12 - 45 U/L    ALT(SGPT) 40 2 - 50 U/L    Alkaline Phosphatase 75 30 - 99 U/L    Total Bilirubin 0.8 0.1 - 1.5 mg/dL    Albumin 3.8 3.2 - 4.9 g/dL    Total Protein 6.9 6.0 - 8.2 g/dL    Globulin 3.1 1.9 - 3.5 g/dL    A-G Ratio 1.2 g/dL   ESTIMATED GFR    Collection Time: 12/27/24  6:55 AM   Result Value Ref Range    GFR (CKD-EPI) 120 >60 mL/min/1.73 m 2   IMMATURE PLT FRACTION    Collection Time: 12/27/24  6:55 AM   Result Value Ref Range    Imm. Plt Fraction 3.6 0.6 - 13.1 %       I have independently interpreted this EKG          COURSE & MEDICAL DECISION MAKING    ASSESSMENT, COURSE AND PLAN  Care Narrative: Patient here with mild to moderate alcohol drawl.  She appears very well otherwise.  Reassuring exam.  Will give Ativan for her alcohol withdrawal.  Will discuss case with behavioral [facility] and coordinate placement in rehab facility  Patient basic labs are reassuring is consistent with dehydration and alcohol abuse.  She is feeling much improved following Ativan here.  She is able to tolerate p.o.  Patient is amenable to going to inpatient rehab facility at [facility].  We help provide  transport.  Peer Support very helpful in executing this plan              DISPOSITION AND DISCUSSIONS    Discussion of management with other [facility] or appropriate source(s): Behavioral health and peer support to coordinate rehab facility placement    Escalation of care considered, and ultimately not performed:diagnostic imaging was deferred in this very well-appearing patient without any reproducible tenderness on exam, surgical follow-up pathology thought to be highly likely    Barriers to care at this time, including but not limited to: Patient does not have established PCP.         FINAL DIAGNOSIS  1. Alcohol withdrawal syndrome without complication (HCC)

## 2024-12-27 NOTE — ED NOTES
Call from pt boyfriend [phone]. He has severe concerns about pt health and safety and believes it is in patients best interest to go directly to rehab.     Peer support at bedside.

## 2024-12-27 NOTE — ED NOTES
Peer support confirmed bed available at [facility] and arranged transit via [facility] to [facility].     Discharge instructions reviewed with patient/ family. Patient verbalizes understanding of follow up care, medication management, and reasons to return to ER. PIV removed with no complications. Pt reports she is ready to get help and is cooperative. Advised she is going to call family to update them.

## 2024-12-27 NOTE — ED TRIAGE NOTES
"Chief Complaint   Patient presents with    ETOH Withdrawal     Pt arrives via EMS with complaint of alcohol withdrawal. Pt drinks daily 1 pint vodka, but has not had anything to drink x 36 hrs. Pt was planning to go to detox center this morning but symptoms worsened. Pt was given 4mg zofran, 4 mg versed and 250 cc of fluids en route. Pt arrives aox4, skin flushed warm and dry, airway patent, rr even and unlabored, CIWA 9.    /76   Pulse 81   Temp 36.2 °C (97.2 °F) (Temporal)   Resp 18   Ht 1.651 m (5' 5\")   Wt 63.5 kg (140 lb)   SpO2 97%   BMI 23.30 kg/m²       "

## 2024-12-27 NOTE — DISCHARGE PLANNING
Alert Team Peer Recovery  was asked by the  to see the patient about detox and resources. PRSS and patient talked  and patient said she would like to go to medical detox and inpatient after. PRSS called [facility] to see if a bed is available,they have beds for detox. PRSS let the nurse know she contacted . He is going to put order in for discharge at that time PRSS will call [facility] to transport.   Quality 128: Preventive Care And Screening: Body Mass Index (Bmi) Screening And Follow-Up Plan: BMI not documented, reason not otherwise specified. Quality 226: Preventive Care And Screening: Tobacco Use: Screening And Cessation Intervention: Patient screened for tobacco use and is an ex/non-smoker Detail Level: Detailed Quality 130: Documentation Of Current Medications In The Medical Record: Current Medications Documented Quality 431: Preventive Care And Screening: Unhealthy Alcohol Use - Screening: Patient screened for unhealthy alcohol use using a single question and scores less than 2 times per year

## 2024-12-27 NOTE — ED NOTES
Bedside report received from off going RN: Rosario, assumed care of patient.  POC discussed with patient. Side rails padded. Pt alert, discussed plan of care.       Fall risk interventions in place: Patient's personal possessions are with in their safe reach and Keep floor surfaces clean and dry (all applicable per [place] Fall risk assessment)   Continuous monitoring: Cardiac Leads, Pulse Ox, or Blood Pressure  IVF/IV medications: Not Applicable   Oxygen: Room Air  Bedside sitter: Not Applicable   Isolation: Not Applicable

## 2025-01-31 ENCOUNTER — HOSPITAL ENCOUNTER (EMERGENCY)
Facility: MEDICAL CENTER | Age: 40
End: 2025-01-31
Attending: EMERGENCY MEDICINE
Payer: COMMERCIAL

## 2025-01-31 VITALS
SYSTOLIC BLOOD PRESSURE: 125 MMHG | TEMPERATURE: 98 F | RESPIRATION RATE: 19 BRPM | OXYGEN SATURATION: 92 % | BODY MASS INDEX: 21.49 KG/M2 | DIASTOLIC BLOOD PRESSURE: 65 MMHG | HEIGHT: 64 IN | HEART RATE: 77 BPM | WEIGHT: 125.88 LBS

## 2025-01-31 DIAGNOSIS — F10.230 ALCOHOL DEPENDENCE WITH UNCOMPLICATED WITHDRAWAL (HCC): ICD-10-CM

## 2025-01-31 LAB
ALBUMIN SERPL BCP-MCNC: 4.5 G/DL (ref 3.2–4.9)
ALBUMIN/GLOB SERPL: 1.3 G/DL
ALP SERPL-CCNC: 85 U/L (ref 30–99)
ALT SERPL-CCNC: 42 U/L (ref 2–50)
AMPHET UR QL SCN: NEGATIVE
ANION GAP SERPL CALC-SCNC: 18 MMOL/L (ref 7–16)
APPEARANCE UR: CLEAR
AST SERPL-CCNC: 78 U/L (ref 12–45)
BACTERIA #/AREA URNS HPF: ABNORMAL /HPF
BARBITURATES UR QL SCN: NEGATIVE
BASOPHILS # BLD AUTO: 0.6 % (ref 0–1.8)
BASOPHILS # BLD: 0.06 K/UL (ref 0–0.12)
BENZODIAZ UR QL SCN: NEGATIVE
BILIRUB SERPL-MCNC: 1.9 MG/DL (ref 0.1–1.5)
BILIRUB UR QL STRIP.AUTO: ABNORMAL
BUN SERPL-MCNC: 11 MG/DL (ref 8–22)
BZE UR QL SCN: POSITIVE
CALCIUM ALBUM COR SERPL-MCNC: 9.7 MG/DL (ref 8.5–10.5)
CALCIUM SERPL-MCNC: 10.1 MG/DL (ref 8.5–10.5)
CANNABINOIDS UR QL SCN: POSITIVE
CASTS URNS QL MICRO: ABNORMAL /LPF (ref 0–2)
CHLORIDE SERPL-SCNC: 94 MMOL/L (ref 96–112)
CO2 SERPL-SCNC: 22 MMOL/L (ref 20–33)
COLOR UR: ABNORMAL
CREAT SERPL-MCNC: 0.56 MG/DL (ref 0.5–1.4)
EOSINOPHIL # BLD AUTO: 0.01 K/UL (ref 0–0.51)
EOSINOPHIL NFR BLD: 0.1 % (ref 0–6.9)
EPITHELIAL CELLS 1715: ABNORMAL /HPF (ref 0–5)
ERYTHROCYTE [DISTWIDTH] IN BLOOD BY AUTOMATED COUNT: 47.6 FL (ref 35.9–50)
ETHANOL BLD-MCNC: <10.1 MG/DL
FENTANYL UR QL: NEGATIVE
GFR SERPLBLD CREATININE-BSD FMLA CKD-EPI: 119 ML/MIN/1.73 M 2
GLOBULIN SER CALC-MCNC: 3.6 G/DL (ref 1.9–3.5)
GLUCOSE SERPL-MCNC: 115 MG/DL (ref 65–99)
GLUCOSE UR STRIP.AUTO-MCNC: NEGATIVE MG/DL
HCT VFR BLD AUTO: 35 % (ref 37–47)
HGB BLD-MCNC: 12.6 G/DL (ref 12–16)
IMM GRANULOCYTES # BLD AUTO: 0.09 K/UL (ref 0–0.11)
IMM GRANULOCYTES NFR BLD AUTO: 1 % (ref 0–0.9)
INR PPP: 1.02 (ref 0.87–1.13)
KETONES UR STRIP.AUTO-MCNC: ABNORMAL MG/DL
LEUKOCYTE ESTERASE UR QL STRIP.AUTO: ABNORMAL
LYMPHOCYTES # BLD AUTO: 1.09 K/UL (ref 1–4.8)
LYMPHOCYTES NFR BLD: 11.8 % (ref 22–41)
MCH RBC QN AUTO: 36.4 PG (ref 27–33)
MCHC RBC AUTO-ENTMCNC: 36 G/DL (ref 32.2–35.5)
MCV RBC AUTO: 101.2 FL (ref 81.4–97.8)
METHADONE UR QL SCN: NEGATIVE
MICRO URNS: ABNORMAL
MONOCYTES # BLD AUTO: 0.4 K/UL (ref 0–0.85)
MONOCYTES NFR BLD AUTO: 4.3 % (ref 0–13.4)
MUCOUS THREADS URNS QL MICRO: PRESENT /HPF
NEUTROPHILS # BLD AUTO: 7.62 K/UL (ref 1.82–7.42)
NEUTROPHILS NFR BLD: 82.2 % (ref 44–72)
NITRITE UR QL STRIP.AUTO: NEGATIVE
NRBC # BLD AUTO: 0 K/UL
NRBC BLD-RTO: 0 /100 WBC (ref 0–0.2)
OPIATES UR QL SCN: NEGATIVE
OXYCODONE UR QL SCN: NEGATIVE
PCP UR QL SCN: NEGATIVE
PH UR STRIP.AUTO: >=9 [PH] (ref 5–8)
PLATELET # BLD AUTO: 166 K/UL (ref 164–446)
PMV BLD AUTO: 9.1 FL (ref 9–12.9)
POTASSIUM SERPL-SCNC: 3.7 MMOL/L (ref 3.6–5.5)
PROPOXYPH UR QL SCN: NEGATIVE
PROT SERPL-MCNC: 8.1 G/DL (ref 6–8.2)
PROT UR QL STRIP: 300 MG/DL
PROTHROMBIN TIME: 13.5 SEC (ref 12–14.6)
RBC # BLD AUTO: 3.46 M/UL (ref 4.2–5.4)
RBC # URNS HPF: ABNORMAL /HPF (ref 0–2)
RBC UR QL AUTO: NEGATIVE
SODIUM SERPL-SCNC: 134 MMOL/L (ref 135–145)
SP GR UR STRIP.AUTO: 1.03
UROBILINOGEN UR STRIP.AUTO-MCNC: 1 EU/DL
WBC # BLD AUTO: 9.3 K/UL (ref 4.8–10.8)
WBC #/AREA URNS HPF: ABNORMAL /HPF

## 2025-01-31 PROCEDURE — 80053 COMPREHEN METABOLIC PANEL: CPT

## 2025-01-31 PROCEDURE — 85610 PROTHROMBIN TIME: CPT

## 2025-01-31 PROCEDURE — 99284 EMERGENCY DEPT VISIT MOD MDM: CPT

## 2025-01-31 PROCEDURE — 700102 HCHG RX REV CODE 250 W/ 637 OVERRIDE(OP): Mod: UD | Performed by: EMERGENCY MEDICINE

## 2025-01-31 PROCEDURE — 81001 URINALYSIS AUTO W/SCOPE: CPT | Mod: XU

## 2025-01-31 PROCEDURE — 85025 COMPLETE CBC W/AUTO DIFF WBC: CPT

## 2025-01-31 PROCEDURE — 80307 DRUG TEST PRSMV CHEM ANLYZR: CPT

## 2025-01-31 PROCEDURE — 36415 COLL VENOUS BLD VENIPUNCTURE: CPT

## 2025-01-31 PROCEDURE — A9270 NON-COVERED ITEM OR SERVICE: HCPCS | Mod: UD | Performed by: EMERGENCY MEDICINE

## 2025-01-31 PROCEDURE — 82077 ASSAY SPEC XCP UR&BREATH IA: CPT

## 2025-01-31 RX ORDER — LORAZEPAM 1 MG/1
0.5 TABLET ORAL EVERY 4 HOURS PRN
Status: DISCONTINUED | OUTPATIENT
Start: 2025-01-31 | End: 2025-02-01 | Stop reason: HOSPADM

## 2025-01-31 RX ORDER — GAUZE BANDAGE 2" X 2"
100 BANDAGE TOPICAL ONCE
Status: COMPLETED | OUTPATIENT
Start: 2025-01-31 | End: 2025-01-31

## 2025-01-31 RX ORDER — LORAZEPAM 2 MG/1
4 TABLET ORAL
Status: DISCONTINUED | OUTPATIENT
Start: 2025-01-31 | End: 2025-02-01 | Stop reason: HOSPADM

## 2025-01-31 RX ORDER — LORAZEPAM 2 MG/ML
1.5 INJECTION INTRAMUSCULAR
Status: DISCONTINUED | OUTPATIENT
Start: 2025-01-31 | End: 2025-02-01 | Stop reason: HOSPADM

## 2025-01-31 RX ORDER — LORAZEPAM 2 MG/ML
2 INJECTION INTRAMUSCULAR
Status: DISCONTINUED | OUTPATIENT
Start: 2025-01-31 | End: 2025-02-01 | Stop reason: HOSPADM

## 2025-01-31 RX ORDER — LORAZEPAM 2 MG/ML
0.5 INJECTION INTRAMUSCULAR EVERY 4 HOURS PRN
Status: DISCONTINUED | OUTPATIENT
Start: 2025-01-31 | End: 2025-02-01 | Stop reason: HOSPADM

## 2025-01-31 RX ORDER — FOLIC ACID 1 MG/1
1 TABLET ORAL ONCE
Status: COMPLETED | OUTPATIENT
Start: 2025-01-31 | End: 2025-01-31

## 2025-01-31 RX ORDER — LORAZEPAM 1 MG/1
1 TABLET ORAL EVERY 4 HOURS PRN
Status: DISCONTINUED | OUTPATIENT
Start: 2025-01-31 | End: 2025-02-01 | Stop reason: HOSPADM

## 2025-01-31 RX ORDER — LORAZEPAM 2 MG/ML
1 INJECTION INTRAMUSCULAR
Status: DISCONTINUED | OUTPATIENT
Start: 2025-01-31 | End: 2025-02-01 | Stop reason: HOSPADM

## 2025-01-31 RX ORDER — LORAZEPAM 2 MG/1
2 TABLET ORAL
Status: DISCONTINUED | OUTPATIENT
Start: 2025-01-31 | End: 2025-02-01 | Stop reason: HOSPADM

## 2025-01-31 RX ADMIN — Medication 100 MG: at 22:03

## 2025-01-31 RX ADMIN — FOLIC ACID 1 MG: 1 TABLET ORAL at 22:03

## 2025-01-31 ASSESSMENT — LIFESTYLE VARIABLES
ORIENTATION AND CLOUDING OF SENSORIUM: ORIENTED AND CAN DO SERIAL ADDITIONS
ANXIETY: NO ANXIETY (AT EASE)
HEADACHE, FULLNESS IN HEAD: NOT PRESENT
TOTAL SCORE: 4
AUDITORY DISTURBANCES: NOT PRESENT
PAROXYSMAL SWEATS: NO SWEAT VISIBLE
NAUSEA AND VOMITING: NO NAUSEA AND NO VOMITING
TREMOR: MODERATE TREMOR WITH ARMS EXTENDED
VISUAL DISTURBANCES: NOT PRESENT
AGITATION: NORMAL ACTIVITY

## 2025-01-31 ASSESSMENT — FIBROSIS 4 INDEX: FIB4 SCORE: 4.71

## 2025-02-01 NOTE — ED NOTES
Patient provided discharge instructions. Patient verbalized understanding. Patient leaving ER in stable condition. Patient ambulatory with steady gait.  IV removed. Patient going home to boyfriend's tonight and will go to [facility] in the morning. Peer supports made aware.

## 2025-02-01 NOTE — ED PROVIDER NOTES
ED Provider Note    CHIEF COMPLAINT  Chief Complaint   Patient presents with    Detox     Pt reports last drink was 8 am this morning. Pt anticipates acceptance to [facility] once medically cleared. Pt spoke with [facility] staff and states she needs blood work, ua done prior to acceptance.            EXTERNAL RECORDS REVIEWED  Outpatient Notes patient has a history of seizures on Keppra also has been seen in our department for alcohol withdrawal    HPI/ROS  LIMITATION TO HISTORY   Select: : None  OUTSIDE HISTORIAN(S):  ferdinand    Franchesca Ortez is a 39 y.o. female who presents to the emergency department stating that she wishes to go to [facility] for alcohol dependence.  She states she normally drinks couple pints a day and her last drink was 8 AM this morning she states she is kind of but had nausea and vomiting throughout the day and just kind of generalized shakiness.  But she was told she need to get medically cleared before she could go there.  She does not want herself or anybody else she states been compliant with her medications today she just feels shaky and nauseated.    PAST MEDICAL HISTORY   has a past medical history of Alcohol abuse, Depression, History of suicide attempt, and Seizure (HCC).    SURGICAL HISTORY   has a past surgical history that includes gyn surgery and primary c section.    FAMILY HISTORY  Family History   Problem Relation Age of Onset    Hypertension Father     Diabetes Father     Cancer Maternal Grandmother         liver       SOCIAL HISTORY  Social History     Tobacco Use    Smoking status: Every Day     Current packs/day: 0.50     Average packs/day: 0.5 packs/day for 15.0 years (7.5 ttl pk-yrs)     Types: Cigarettes    Smokeless tobacco: Never    Tobacco comments:     3/4 pk a day   Vaping Use    Vaping status: Never Used   Substance and Sexual Activity    Alcohol use: Yes     Comment: 1 pint a day vodka    Drug use: Yes     Types: Inhaled     Comment: marijuana    Sexual  "activity: Yes     Partners: Male     Birth control/protection: I.U.D.       CURRENT MEDICATIONS  Home Medications       Reviewed by Yas OLIVER R.N. (Registered Nurse) on 01/31/25 at 2048  Med List Status: Not Addressed     Medication Last Dose Status   levETIRAcetam (KEPPRA) 500 MG Tab  Active   levETIRAcetam (KEPPRA) 500 MG Tab  Active   levETIRAcetam (KEPPRA) 500 MG Tab  Active                    ALLERGIES  No Known Allergies    PHYSICAL EXAM  VITAL SIGNS: /76   Pulse 75   Temp 36.2 °C (97.2 °F) (Temporal)   Resp 18   Ht 1.626 m (5' 4\")   Wt 57.1 kg (125 lb 14.1 oz)   SpO2 96%   BMI 21.61 kg/m²    Pulse OX: Pulse Oxygen level is within normal limits  Constitutional: Alert in no apparent distress.  HENT: Normocephalic, Atraumatic, mildly dry mucous membranes mild tongue tremors  Eyes: PERound. Conjunctiva normal, non-icteric.   Heart: Regular rate and rhythm, intact distal pulses   Lungs: Symmetrical movement, no resp distress clear to auscultation bilaterally  Abdomen: Non-tender, non-distended, normal bowel sounds  EXT/Back mild hand shaking  Skin: Warm, Dry, No erythema, No rash.   Neurologic: Alert and oriented, Grossly non-focal.       EKG/LABS  Labs Reviewed   CBC WITH DIFFERENTIAL - Abnormal; Notable for the following components:       Result Value    RBC 3.46 (*)     Hematocrit 35.0 (*)     .2 (*)     MCH 36.4 (*)     MCHC 36.0 (*)     Neutrophils-Polys 82.20 (*)     Lymphocytes 11.80 (*)     Immature Granulocytes 1.00 (*)     Neutrophils (Absolute) 7.62 (*)     All other components within normal limits   COMP METABOLIC PANEL - Abnormal; Notable for the following components:    Sodium 134 (*)     Chloride 94 (*)     Anion Gap 18.0 (*)     Glucose 115 (*)     AST(SGOT) 78 (*)     Total Bilirubin 1.9 (*)     Globulin 3.6 (*)     All other components within normal limits   URINALYSIS,CULTURE IF INDICATED - Abnormal; Notable for the following components:    Color Orange (*)     " Ph >=9.0 (*)     Ketones Trace (*)     Protein 300 (*)     Bilirubin Small (*)     Leukocyte Esterase Small (*)     All other components within normal limits   URINE DRUG SCREEN - Abnormal; Notable for the following components:    Cocaine Metabolite Positive (*)     Cannabinoid Metab Positive (*)     All other components within normal limits   URINE MICROSCOPIC (W/UA) - Abnormal; Notable for the following components:    Bacteria Many (*)     All other components within normal limits   PROTHROMBIN TIME   DIAGNOSTIC ALCOHOL   ESTIMATED GFR     COURSE & MEDICAL DECISION MAKING    ASSESSMENT, COURSE AND PLAN  Care Narrative:     Patient is a 39-year-old female with a history of alcohol dependence presents to the emerged department complaining for signs of withdrawal and wishing to get inpatient treatment.  She had a very low CIWA score on arrival was only 4 she already states just sitting there she is starting to feel better.  Will be given thiamine and folate will perform laboratory analysis that was requested.  She is not complaining of urinary discomfort at this time we will put her on CIWA protocol and I will discuss case with behavioral health.    DISPOSITION AND DISCUSSIONS  Patient's labs are not significantly unremarkable she does have many bacteria but she is not complaining of dysuria negative nitrites and epithelial cells are present so concern for contaminant.  Will be sent for culture.  Otherwise there is no signs of infection urine drug screen is unfortunately positive for cocaine and cannabinoids no evidence of acute renal dysfunction coags are normal and her alcohol is negative.  I spoke with my behavioral health team and [facility] beds and she will be taken directly there via taxi for inpatient alcohol management        I have discussed management of the patient with the following physicians and LISET's:  none    Discussion of management with other QHP or appropriate source(s):  Behavioral Health for dispo      Escalation of care considered, and ultimately not performed:acute inpatient care management, however at this time, the patient is most appropriate for outpatient management    Barriers to care at this time, including but not limited to:  na .     Decision tools and prescription drugs considered including, but not limited to:  CIWA 4 .    The patient will return for new or worsening symptoms and is stable at the time of discharge.    The patient is referred to a primary physician for blood pressure management, diabetic screening, and for all other preventative health concerns.    DISPOSITION:  Patient will be discharged home in stable condition.    FOLLOW UP:  [facility], Emergency Dept  [facility] [facility] [id number] [phone]    If symptoms worsen      OUTPATIENT MEDICATIONS:  Discharge Medication List as of 1/31/2025 10:40 PM            FINAL DIAGNOSIS  1. Alcohol dependence with uncomplicated withdrawal (HCC)         Electronically signed by: Linh Hernandez M.D., 1/31/2025 9:18 PM

## 2025-02-01 NOTE — ED TRIAGE NOTES
"Chief Complaint   Patient presents with    Detox     Pt reports last drink was 8 am this morning. Pt anticipates acceptance to [facility] once medically cleared. Pt spoke with [facility] staff and states she needs blood work, ua done prior to acceptance.          Hx: seizure, on keppra ( compliant with medications)    Pt ambulatory to triage. Pt A&Ox4    Pt to lobby . Pt educated on alerting staff in changes to condition. Pt verbalized understanding.     /76   Pulse 75   Temp 36.2 °C (97.2 °F) (Temporal)   Resp 18   Ht 1.626 m (5' 4\")   Wt 57.1 kg (125 lb 14.1 oz)   SpO2 96%   BMI 21.61 kg/m²    "

## 2025-03-20 ENCOUNTER — APPOINTMENT (OUTPATIENT)
Dept: RADIOLOGY | Facility: MEDICAL CENTER | Age: 40
DRG: 894 | End: 2025-03-20
Attending: EMERGENCY MEDICINE
Payer: COMMERCIAL

## 2025-03-20 ENCOUNTER — HOSPITAL ENCOUNTER (INPATIENT)
Facility: MEDICAL CENTER | Age: 40
LOS: 1 days | DRG: 894 | End: 2025-03-21
Attending: EMERGENCY MEDICINE | Admitting: STUDENT IN AN ORGANIZED HEALTH CARE EDUCATION/TRAINING PROGRAM
Payer: COMMERCIAL

## 2025-03-20 DIAGNOSIS — F10.939 ALCOHOL WITHDRAWAL SYNDROME WITH COMPLICATION (HCC): ICD-10-CM

## 2025-03-20 DIAGNOSIS — G40.909 SEIZURE DISORDER (HCC): ICD-10-CM

## 2025-03-20 PROBLEM — K70.10 ALCOHOLIC HEPATITIS: Status: ACTIVE | Noted: 2025-03-20

## 2025-03-20 PROBLEM — G40.919 BREAKTHROUGH SEIZURE (HCC): Status: ACTIVE | Noted: 2025-03-20

## 2025-03-20 LAB
ALBUMIN SERPL BCP-MCNC: 4.3 G/DL (ref 3.2–4.9)
ALBUMIN/GLOB SERPL: 1.4 G/DL
ALP SERPL-CCNC: 71 U/L (ref 30–99)
ALT SERPL-CCNC: 74 U/L (ref 2–50)
AMPHET UR QL SCN: NEGATIVE
ANION GAP SERPL CALC-SCNC: 27 MMOL/L (ref 7–16)
ANISOCYTOSIS BLD QL SMEAR: ABNORMAL
AST SERPL-CCNC: 190 U/L (ref 12–45)
BARBITURATES UR QL SCN: NEGATIVE
BASOPHILS # BLD AUTO: 0 % (ref 0–1.8)
BASOPHILS # BLD: 0 K/UL (ref 0–0.12)
BENZODIAZ UR QL SCN: POSITIVE
BILIRUB SERPL-MCNC: 0.6 MG/DL (ref 0.1–1.5)
BUN SERPL-MCNC: 15 MG/DL (ref 8–22)
BZE UR QL SCN: NEGATIVE
CALCIUM ALBUM COR SERPL-MCNC: 9.1 MG/DL (ref 8.5–10.5)
CALCIUM SERPL-MCNC: 9.3 MG/DL (ref 8.5–10.5)
CANNABINOIDS UR QL SCN: POSITIVE
CHLORIDE SERPL-SCNC: 99 MMOL/L (ref 96–112)
CO2 SERPL-SCNC: 13 MMOL/L (ref 20–33)
CREAT SERPL-MCNC: 0.57 MG/DL (ref 0.5–1.4)
EKG IMPRESSION: NORMAL
EOSINOPHIL # BLD AUTO: 0.05 K/UL (ref 0–0.51)
EOSINOPHIL NFR BLD: 0.9 % (ref 0–6.9)
ERYTHROCYTE [DISTWIDTH] IN BLOOD BY AUTOMATED COUNT: 52.6 FL (ref 35.9–50)
ETHANOL BLD-MCNC: 20.5 MG/DL
FENTANYL UR QL: NEGATIVE
GFR SERPLBLD CREATININE-BSD FMLA CKD-EPI: 119 ML/MIN/1.73 M 2
GLOBULIN SER CALC-MCNC: 3 G/DL (ref 1.9–3.5)
GLUCOSE SERPL-MCNC: 124 MG/DL (ref 65–99)
HCG SERPL QL: NEGATIVE
HCT VFR BLD AUTO: 34.2 % (ref 37–47)
HGB BLD-MCNC: 11.9 G/DL (ref 12–16)
LYMPHOCYTES # BLD AUTO: 0.54 K/UL (ref 1–4.8)
LYMPHOCYTES NFR BLD: 9.5 % (ref 22–41)
MACROCYTES BLD QL SMEAR: ABNORMAL
MANUAL DIFF BLD: NORMAL
MCH RBC QN AUTO: 35.8 PG (ref 27–33)
MCHC RBC AUTO-ENTMCNC: 34.8 G/DL (ref 32.2–35.5)
MCV RBC AUTO: 103 FL (ref 81.4–97.8)
METHADONE UR QL SCN: NEGATIVE
MONOCYTES # BLD AUTO: 0.2 K/UL (ref 0–0.85)
MONOCYTES NFR BLD AUTO: 3.5 % (ref 0–13.4)
MORPHOLOGY BLD-IMP: NORMAL
NEUTROPHILS # BLD AUTO: 4.91 K/UL (ref 1.82–7.42)
NEUTROPHILS NFR BLD: 85.2 % (ref 44–72)
NEUTS BAND NFR BLD MANUAL: 0.9 % (ref 0–10)
NRBC # BLD AUTO: 0 K/UL
NRBC BLD-RTO: 0 /100 WBC (ref 0–0.2)
OPIATES UR QL SCN: NEGATIVE
OXYCODONE UR QL SCN: NEGATIVE
PCP UR QL SCN: NEGATIVE
PLATELET # BLD AUTO: 85 K/UL (ref 164–446)
PLATELET BLD QL SMEAR: NORMAL
PLATELETS.RETICULATED NFR BLD AUTO: 3.7 % (ref 0.6–13.1)
PMV BLD AUTO: 9.6 FL (ref 9–12.9)
POTASSIUM SERPL-SCNC: 3.7 MMOL/L (ref 3.6–5.5)
PROPOXYPH UR QL SCN: NEGATIVE
PROT SERPL-MCNC: 7.3 G/DL (ref 6–8.2)
RBC # BLD AUTO: 3.32 M/UL (ref 4.2–5.4)
RBC BLD AUTO: PRESENT
SODIUM SERPL-SCNC: 139 MMOL/L (ref 135–145)
WBC # BLD AUTO: 5.7 K/UL (ref 4.8–10.8)

## 2025-03-20 PROCEDURE — 36415 COLL VENOUS BLD VENIPUNCTURE: CPT

## 2025-03-20 PROCEDURE — 85027 COMPLETE CBC AUTOMATED: CPT

## 2025-03-20 PROCEDURE — 80053 COMPREHEN METABOLIC PANEL: CPT

## 2025-03-20 PROCEDURE — HZ2ZZZZ DETOXIFICATION SERVICES FOR SUBSTANCE ABUSE TREATMENT: ICD-10-PCS | Performed by: STUDENT IN AN ORGANIZED HEALTH CARE EDUCATION/TRAINING PROGRAM

## 2025-03-20 PROCEDURE — 84703 CHORIONIC GONADOTROPIN ASSAY: CPT

## 2025-03-20 PROCEDURE — 70450 CT HEAD/BRAIN W/O DYE: CPT

## 2025-03-20 PROCEDURE — A9270 NON-COVERED ITEM OR SERVICE: HCPCS | Mod: UD | Performed by: EMERGENCY MEDICINE

## 2025-03-20 PROCEDURE — 85007 BL SMEAR W/DIFF WBC COUNT: CPT

## 2025-03-20 PROCEDURE — 99406 BEHAV CHNG SMOKING 3-10 MIN: CPT

## 2025-03-20 PROCEDURE — 70486 CT MAXILLOFACIAL W/O DYE: CPT

## 2025-03-20 PROCEDURE — A9270 NON-COVERED ITEM OR SERVICE: HCPCS | Performed by: STUDENT IN AN ORGANIZED HEALTH CARE EDUCATION/TRAINING PROGRAM

## 2025-03-20 PROCEDURE — 700111 HCHG RX REV CODE 636 W/ 250 OVERRIDE (IP): Performed by: EMERGENCY MEDICINE

## 2025-03-20 PROCEDURE — 93005 ELECTROCARDIOGRAM TRACING: CPT | Mod: TC | Performed by: EMERGENCY MEDICINE

## 2025-03-20 PROCEDURE — 700105 HCHG RX REV CODE 258: Performed by: STUDENT IN AN ORGANIZED HEALTH CARE EDUCATION/TRAINING PROGRAM

## 2025-03-20 PROCEDURE — 85055 RETICULATED PLATELET ASSAY: CPT

## 2025-03-20 PROCEDURE — 96376 TX/PRO/DX INJ SAME DRUG ADON: CPT

## 2025-03-20 PROCEDURE — 82077 ASSAY SPEC XCP UR&BREATH IA: CPT

## 2025-03-20 PROCEDURE — 80307 DRUG TEST PRSMV CHEM ANLYZR: CPT

## 2025-03-20 PROCEDURE — 96374 THER/PROPH/DIAG INJ IV PUSH: CPT

## 2025-03-20 PROCEDURE — 99285 EMERGENCY DEPT VISIT HI MDM: CPT

## 2025-03-20 PROCEDURE — 700102 HCHG RX REV CODE 250 W/ 637 OVERRIDE(OP): Performed by: STUDENT IN AN ORGANIZED HEALTH CARE EDUCATION/TRAINING PROGRAM

## 2025-03-20 PROCEDURE — 96375 TX/PRO/DX INJ NEW DRUG ADDON: CPT

## 2025-03-20 PROCEDURE — 700102 HCHG RX REV CODE 250 W/ 637 OVERRIDE(OP): Mod: UD | Performed by: EMERGENCY MEDICINE

## 2025-03-20 PROCEDURE — 770020 HCHG ROOM/CARE - TELE (206)

## 2025-03-20 RX ORDER — LORAZEPAM 1 MG/1
1 TABLET ORAL EVERY 4 HOURS PRN
Status: DISCONTINUED | OUTPATIENT
Start: 2025-03-20 | End: 2025-03-21 | Stop reason: HOSPADM

## 2025-03-20 RX ORDER — ONDANSETRON 2 MG/ML
4 INJECTION INTRAMUSCULAR; INTRAVENOUS EVERY 4 HOURS PRN
Status: DISCONTINUED | OUTPATIENT
Start: 2025-03-20 | End: 2025-03-21 | Stop reason: HOSPADM

## 2025-03-20 RX ORDER — PROMETHAZINE HYDROCHLORIDE 25 MG/1
12.5-25 SUPPOSITORY RECTAL EVERY 4 HOURS PRN
Status: DISCONTINUED | OUTPATIENT
Start: 2025-03-20 | End: 2025-03-21 | Stop reason: HOSPADM

## 2025-03-20 RX ORDER — LEVETIRACETAM 500 MG/1
1000 TABLET ORAL 2 TIMES DAILY
Status: DISCONTINUED | OUTPATIENT
Start: 2025-03-20 | End: 2025-03-21 | Stop reason: HOSPADM

## 2025-03-20 RX ORDER — LORAZEPAM 1 MG/1
0.5 TABLET ORAL EVERY 4 HOURS PRN
Status: DISCONTINUED | OUTPATIENT
Start: 2025-03-20 | End: 2025-03-21 | Stop reason: HOSPADM

## 2025-03-20 RX ORDER — LORAZEPAM 2 MG/ML
0.5 INJECTION INTRAMUSCULAR EVERY 4 HOURS PRN
Status: DISCONTINUED | OUTPATIENT
Start: 2025-03-20 | End: 2025-03-21 | Stop reason: HOSPADM

## 2025-03-20 RX ORDER — GAUZE BANDAGE 2" X 2"
100 BANDAGE TOPICAL DAILY
Status: DISCONTINUED | OUTPATIENT
Start: 2025-03-21 | End: 2025-03-21 | Stop reason: HOSPADM

## 2025-03-20 RX ORDER — LORAZEPAM 2 MG/1
2 TABLET ORAL
Status: DISCONTINUED | OUTPATIENT
Start: 2025-03-20 | End: 2025-03-21 | Stop reason: HOSPADM

## 2025-03-20 RX ORDER — SODIUM CHLORIDE, SODIUM LACTATE, POTASSIUM CHLORIDE, CALCIUM CHLORIDE 600; 310; 30; 20 MG/100ML; MG/100ML; MG/100ML; MG/100ML
INJECTION, SOLUTION INTRAVENOUS CONTINUOUS
Status: DISCONTINUED | OUTPATIENT
Start: 2025-03-20 | End: 2025-03-21

## 2025-03-20 RX ORDER — PROCHLORPERAZINE EDISYLATE 5 MG/ML
5-10 INJECTION INTRAMUSCULAR; INTRAVENOUS EVERY 4 HOURS PRN
Status: DISCONTINUED | OUTPATIENT
Start: 2025-03-20 | End: 2025-03-21 | Stop reason: HOSPADM

## 2025-03-20 RX ORDER — LORAZEPAM 2 MG/ML
1 INJECTION INTRAMUSCULAR ONCE
Status: COMPLETED | OUTPATIENT
Start: 2025-03-20 | End: 2025-03-20

## 2025-03-20 RX ORDER — LANOLIN ALCOHOL/MO/W.PET/CERES
400 CREAM (GRAM) TOPICAL ONCE
Status: COMPLETED | OUTPATIENT
Start: 2025-03-20 | End: 2025-03-20

## 2025-03-20 RX ORDER — LORAZEPAM 2 MG/1
4 TABLET ORAL
Status: DISCONTINUED | OUTPATIENT
Start: 2025-03-20 | End: 2025-03-21 | Stop reason: HOSPADM

## 2025-03-20 RX ORDER — LABETALOL HYDROCHLORIDE 5 MG/ML
10 INJECTION, SOLUTION INTRAVENOUS EVERY 4 HOURS PRN
Status: DISCONTINUED | OUTPATIENT
Start: 2025-03-20 | End: 2025-03-21 | Stop reason: HOSPADM

## 2025-03-20 RX ORDER — PROMETHAZINE HYDROCHLORIDE 25 MG/1
12.5-25 TABLET ORAL EVERY 4 HOURS PRN
Status: DISCONTINUED | OUTPATIENT
Start: 2025-03-20 | End: 2025-03-21 | Stop reason: HOSPADM

## 2025-03-20 RX ORDER — FOLIC ACID 1 MG/1
1 TABLET ORAL ONCE
Status: COMPLETED | OUTPATIENT
Start: 2025-03-20 | End: 2025-03-20

## 2025-03-20 RX ORDER — ONDANSETRON 4 MG/1
4 TABLET, ORALLY DISINTEGRATING ORAL EVERY 4 HOURS PRN
Status: DISCONTINUED | OUTPATIENT
Start: 2025-03-20 | End: 2025-03-21 | Stop reason: HOSPADM

## 2025-03-20 RX ORDER — ACETAMINOPHEN 325 MG/1
650 TABLET ORAL EVERY 6 HOURS PRN
Status: DISCONTINUED | OUTPATIENT
Start: 2025-03-20 | End: 2025-03-21 | Stop reason: HOSPADM

## 2025-03-20 RX ORDER — LORAZEPAM 2 MG/ML
1.5 INJECTION INTRAMUSCULAR
Status: DISCONTINUED | OUTPATIENT
Start: 2025-03-20 | End: 2025-03-21 | Stop reason: HOSPADM

## 2025-03-20 RX ORDER — ONDANSETRON 2 MG/ML
4 INJECTION INTRAMUSCULAR; INTRAVENOUS ONCE
Status: COMPLETED | OUTPATIENT
Start: 2025-03-20 | End: 2025-03-20

## 2025-03-20 RX ORDER — FOLIC ACID 1 MG/1
1 TABLET ORAL DAILY
Status: DISCONTINUED | OUTPATIENT
Start: 2025-03-21 | End: 2025-03-21 | Stop reason: HOSPADM

## 2025-03-20 RX ORDER — LEVETIRACETAM 500 MG/5ML
1000 INJECTION, SOLUTION, CONCENTRATE INTRAVENOUS ONCE
Status: COMPLETED | OUTPATIENT
Start: 2025-03-20 | End: 2025-03-20

## 2025-03-20 RX ORDER — ENOXAPARIN SODIUM 100 MG/ML
40 INJECTION SUBCUTANEOUS DAILY
Status: DISCONTINUED | OUTPATIENT
Start: 2025-03-21 | End: 2025-03-21 | Stop reason: HOSPADM

## 2025-03-20 RX ORDER — LORAZEPAM 2 MG/ML
2 INJECTION INTRAMUSCULAR
Status: DISCONTINUED | OUTPATIENT
Start: 2025-03-20 | End: 2025-03-21 | Stop reason: HOSPADM

## 2025-03-20 RX ORDER — GAUZE BANDAGE 2" X 2"
100 BANDAGE TOPICAL ONCE
Status: COMPLETED | OUTPATIENT
Start: 2025-03-20 | End: 2025-03-20

## 2025-03-20 RX ORDER — LORAZEPAM 2 MG/ML
1 INJECTION INTRAMUSCULAR
Status: DISCONTINUED | OUTPATIENT
Start: 2025-03-20 | End: 2025-03-21 | Stop reason: HOSPADM

## 2025-03-20 RX ADMIN — SODIUM CHLORIDE, POTASSIUM CHLORIDE, SODIUM LACTATE AND CALCIUM CHLORIDE: 600; 310; 30; 20 INJECTION, SOLUTION INTRAVENOUS at 19:59

## 2025-03-20 RX ADMIN — FOLIC ACID 1 MG: 1 TABLET ORAL at 18:56

## 2025-03-20 RX ADMIN — THERA TABS 1 TABLET: TAB at 18:56

## 2025-03-20 RX ADMIN — LEVETIRACETAM 1000 MG: 500 TABLET, FILM COATED ORAL at 19:56

## 2025-03-20 RX ADMIN — LEVETIRACETAM 1000 MG: 100 INJECTION, SOLUTION INTRAVENOUS at 14:56

## 2025-03-20 RX ADMIN — LORAZEPAM 1 MG: 2 INJECTION INTRAMUSCULAR; INTRAVENOUS at 19:57

## 2025-03-20 RX ADMIN — LORAZEPAM 1 MG: 2 INJECTION INTRAMUSCULAR; INTRAVENOUS at 18:25

## 2025-03-20 RX ADMIN — NICOTINE 7 MG: 7 PATCH TRANSDERMAL at 20:01

## 2025-03-20 RX ADMIN — Medication 400 MG: at 18:55

## 2025-03-20 RX ADMIN — ONDANSETRON 4 MG: 2 INJECTION INTRAMUSCULAR; INTRAVENOUS at 14:55

## 2025-03-20 RX ADMIN — LORAZEPAM 1 MG: 2 INJECTION INTRAMUSCULAR; INTRAVENOUS at 14:55

## 2025-03-20 RX ADMIN — Medication 100 MG: at 18:56

## 2025-03-20 RX ADMIN — LORAZEPAM 1 MG: 1 TABLET ORAL at 22:52

## 2025-03-20 SDOH — ECONOMIC STABILITY: TRANSPORTATION INSECURITY
IN THE PAST 12 MONTHS, HAS THE LACK OF TRANSPORTATION KEPT YOU FROM MEDICAL APPOINTMENTS OR FROM GETTING MEDICATIONS?: YES

## 2025-03-20 SDOH — ECONOMIC STABILITY: TRANSPORTATION INSECURITY
IN THE PAST 12 MONTHS, HAS LACK OF RELIABLE TRANSPORTATION KEPT YOU FROM MEDICAL APPOINTMENTS, MEETINGS, WORK OR FROM GETTING THINGS NEEDED FOR DAILY LIVING?: YES

## 2025-03-20 ASSESSMENT — SOCIAL DETERMINANTS OF HEALTH (SDOH)
WITHIN THE PAST 12 MONTHS, YOU WORRIED THAT YOUR FOOD WOULD RUN OUT BEFORE YOU GOT THE MONEY TO BUY MORE: SOMETIMES TRUE
WITHIN THE PAST 12 MONTHS, THE FOOD YOU BOUGHT JUST DIDN'T LAST AND YOU DIDN'T HAVE MONEY TO GET MORE: SOMETIMES TRUE
WITHIN THE LAST YEAR, HAVE TO BEEN RAPED OR FORCED TO HAVE ANY KIND OF SEXUAL ACTIVITY BY YOUR PARTNER OR EX-PARTNER?: NO
IN THE PAST 12 MONTHS, HAS THE ELECTRIC, GAS, OIL, OR WATER COMPANY THREATENED TO SHUT OFF SERVICE IN YOUR HOME?: NO
WITHIN THE LAST YEAR, HAVE YOU BEEN HUMILIATED OR EMOTIONALLY ABUSED IN OTHER WAYS BY YOUR PARTNER OR EX-PARTNER?: NO
WITHIN THE LAST YEAR, HAVE YOU BEEN KICKED, HIT, SLAPPED, OR OTHERWISE PHYSICALLY HURT BY YOUR PARTNER OR EX-PARTNER?: NO
WITHIN THE LAST YEAR, HAVE YOU BEEN AFRAID OF YOUR PARTNER OR EX-PARTNER?: NO

## 2025-03-20 ASSESSMENT — COGNITIVE AND FUNCTIONAL STATUS - GENERAL
STANDING UP FROM CHAIR USING ARMS: A LITTLE
SUGGESTED CMS G CODE MODIFIER MOBILITY: CJ
SUGGESTED CMS G CODE MODIFIER DAILY ACTIVITY: CH
WALKING IN HOSPITAL ROOM: A LITTLE
CLIMB 3 TO 5 STEPS WITH RAILING: A LOT
DAILY ACTIVITIY SCORE: 24
MOBILITY SCORE: 20

## 2025-03-20 ASSESSMENT — LIFESTYLE VARIABLES
VISUAL DISTURBANCES: NOT PRESENT
NAUSEA AND VOMITING: MILD NAUSEA WITH NO VOMITING
HAVE PEOPLE ANNOYED YOU BY CRITICIZING YOUR DRINKING: YES
TOTAL SCORE: 4
CONSUMPTION TOTAL: POSITIVE
AVERAGE NUMBER OF DAYS PER WEEK YOU HAVE A DRINK CONTAINING ALCOHOL: 7
ALCOHOL_USE: YES
HOW MANY TIMES IN THE PAST YEAR HAVE YOU HAD 5 OR MORE DRINKS IN A DAY: 360
HEADACHE, FULLNESS IN HEAD: NOT PRESENT
TOTAL SCORE: 9
DOES PATIENT WANT TO STOP DRINKING: YES
AUDITORY DISTURBANCES: NOT PRESENT
TREMOR: MODERATE TREMOR WITH ARMS EXTENDED
PAROXYSMAL SWEATS: BARELY PERCEPTIBLE SWEATING. PALMS MOIST
EVER FELT BAD OR GUILTY ABOUT YOUR DRINKING: YES
AGITATION: SOMEWHAT MORE THAN NORMAL ACTIVITY
ON A TYPICAL DAY WHEN YOU DRINK ALCOHOL HOW MANY DRINKS DO YOU HAVE: 5
EVER HAD A DRINK FIRST THING IN THE MORNING TO STEADY YOUR NERVES TO GET RID OF A HANGOVER: YES
ORIENTATION AND CLOUDING OF SENSORIUM: ORIENTED AND CAN DO SERIAL ADDITIONS
HAVE YOU EVER FELT YOU SHOULD CUT DOWN ON YOUR DRINKING: YES
TOTAL SCORE: 4
ANXIETY: *
TOTAL SCORE: 4
DOES PATIENT WANT TO TALK TO SOMEONE ABOUT QUITTING: YES

## 2025-03-20 ASSESSMENT — PATIENT HEALTH QUESTIONNAIRE - PHQ9
2. FEELING DOWN, DEPRESSED, IRRITABLE, OR HOPELESS: NOT AT ALL
SUM OF ALL RESPONSES TO PHQ9 QUESTIONS 1 AND 2: 0
1. LITTLE INTEREST OR PLEASURE IN DOING THINGS: NOT AT ALL

## 2025-03-20 ASSESSMENT — PAIN DESCRIPTION - PAIN TYPE
TYPE: ACUTE PAIN
TYPE: ACUTE PAIN

## 2025-03-20 ASSESSMENT — FIBROSIS 4 INDEX
FIB4 SCORE: 9.87
FIB4 SCORE: 9.87
FIB4 SCORE: 2.76

## 2025-03-20 NOTE — ED TRIAGE NOTES
Franchesca Ortez  39 y.o. female  Chief Complaint   Patient presents with    Seizure     BIB EMS from home for seizure lasting approx 90 seconds per mom who witnessed. Takes Keppra twice daily, did not take morning dose. AAO x 2 on EMS arrival, AAO x 4 on ER arrival. Hx brain tumor and alcohol abuse, cannot remember if she drank alcohol yesterday     Pt BIB EMS for above complaint. Swelling and bruising noted to face.    Medicated with 1 g Tylenol by EMS PTA    Pt is GCS 15, speaking in full sentences, follows commands and responds appropriately to questions. Resp are even and unlabored.     EKG protocol ordered.     Vitals:    03/20/25 1421   BP: 126/77   Pulse: 86   Resp: 18   Temp: 36.5 °C (97.7 °F)   SpO2: 96%

## 2025-03-20 NOTE — ED PROVIDER NOTES
ED Provider Note    CHIEF COMPLAINT  Chief Complaint   Patient presents with    Seizure     BIB EMS from home for seizure lasting approx 90 seconds per mom who witnessed. Takes Keppra twice daily, did not take morning dose. AAO x 2 on EMS arrival, AAO x 4 on ER arrival. Hx brain tumor and alcohol abuse, cannot remember if she drank alcohol yesterday       EXTERNAL RECORDS REVIEWED  Reviewed previous imaging studies including MRI dated October 2024, physician notes baseline laboratory studies    HPI/ROS  LIMITATION TO HISTORY   None  OUTSIDE HISTORIAN(S):  EMS provided additional history    Franchesca Nancy Ortez is a 39 y.o. female who presents for evaluation of a possible 90-minute tonic-clonic seizure.  The patient has a known history of seizure disorder as well as a small brain tumor with concomitant alcohol abuse.  She thinks she forgot her morning Keppra dose.  She also drank alcohol last night.  She reports head and facial pain but no pain or injury to the neck upper or lower back chest abdomen pelvis upper or lower extremities.  She denies fevers or chills.  No focal numbness weakness tingling to the arms legs or face    PAST MEDICAL HISTORY   has a past medical history of Alcohol abuse, Depression, History of suicide attempt, and Seizure (HCC).    SURGICAL HISTORY   has a past surgical history that includes gyn surgery and primary c section.    FAMILY HISTORY  Family History   Problem Relation Age of Onset    Hypertension Father     Diabetes Father     Cancer Maternal Grandmother         liver       SOCIAL HISTORY  Social History     Tobacco Use    Smoking status: Every Day     Current packs/day: 0.50     Average packs/day: 0.5 packs/day for 15.0 years (7.5 ttl pk-yrs)     Types: Cigarettes    Smokeless tobacco: Never    Tobacco comments:     3/4 pk a day   Vaping Use    Vaping status: Never Used   Substance and Sexual Activity    Alcohol use: Yes     Comment: Hx of 1 pint a day vodka. 1-2 drinks daily    Drug  "use: Yes     Types: Inhaled     Comment: marijuana    Sexual activity: Yes     Partners: Male     Birth control/protection: I.U.D.   History of polysubstance abuse history of alcohol abuse    CURRENT MEDICATIONS  Home Medications       Reviewed by Greg Ma R.N. (Registered Nurse) on 03/20/25 at 1423  Med List Status: Partial     Medication Last Dose Status   levETIRAcetam (KEPPRA) 500 MG Tab  Active   levETIRAcetam (KEPPRA) 500 MG Tab  Active                  Audit from Redirected Encounters    **Home medications have not yet been reviewed for this encounter**         ALLERGIES  No Known Allergies    PHYSICAL EXAM  VITAL SIGNS: /83   Pulse 80   Temp 36.5 °C (97.7 °F) (Temporal)   Resp 15   Ht 1.651 m (5' 5\")   Wt 56.7 kg (125 lb)   SpO2 96%   BMI 20.80 kg/m²    Pulse ox interpretation: I interpret this pulse ox as normal.  Constitutional: Alert and oriented x 3, moderate distress  HEENT: Significant ecchymosis and bruising on the left maxillary prominence.  There is no dental malalignment or tenderness at the angle of the mandible.  No hemotympanum negative Blum sign, pupils are equal round reactive to light extraocular movements are intact. The nares is clear, external ears are normal, mouth shows moist mucous membranes normal dentition for age  Neck: Supple, no JVD no tracheal deviation no midline cervical tenderness  Back: No thoracolumbar bony tenderness  Cardiovascular: Regular rate and rhythm no murmur rub or gallop 2+ pulses peripherally x4  Thorax & Lungs: No respiratory distress, no wheezes rales or rhonchi, No chest tenderness.   GI: Soft nontender nondistended positive bowel sounds, no peritoneal signs  Skin: Warm dry no acute rash or lesion  Musculoskeletal: Moving all extremities with full range and 5 of 5 strength no acute  deformity  Neurologic: Initially postictal but ultimately cleared.  Oriented x 4.  Mild resting tremor noted.  No evidence of seizure activity cranial nerves " III through XII are grossly intact no sensory deficit no cerebellar dysfunction   Psychiatric: Anxious        EKG/LABS  Results for orders placed or performed during the hospital encounter of 03/20/25   HCG QUAL SERUM    Collection Time: 03/20/25  2:30 PM   Result Value Ref Range    Beta-Hcg Qualitative Serum Negative Negative   CBC WITH DIFFERENTIAL    Collection Time: 03/20/25  2:30 PM   Result Value Ref Range    WBC 5.7 4.8 - 10.8 K/uL    RBC 3.32 (L) 4.20 - 5.40 M/uL    Hemoglobin 11.9 (L) 12.0 - 16.0 g/dL    Hematocrit 34.2 (L) 37.0 - 47.0 %    .0 (H) 81.4 - 97.8 fL    MCH 35.8 (H) 27.0 - 33.0 pg    MCHC 34.8 32.2 - 35.5 g/dL    RDW 52.6 (H) 35.9 - 50.0 fL    Platelet Count 85 (L) 164 - 446 K/uL    MPV 9.6 9.0 - 12.9 fL    Neutrophils-Polys 85.20 (H) 44.00 - 72.00 %    Lymphocytes 9.50 (L) 22.00 - 41.00 %    Monocytes 3.50 0.00 - 13.40 %    Eosinophils 0.90 0.00 - 6.90 %    Basophils 0.00 0.00 - 1.80 %    Nucleated RBC 0.00 0.00 - 0.20 /100 WBC    Neutrophils (Absolute) 4.91 1.82 - 7.42 K/uL    Lymphs (Absolute) 0.54 (L) 1.00 - 4.80 K/uL    Monos (Absolute) 0.20 0.00 - 0.85 K/uL    Eos (Absolute) 0.05 0.00 - 0.51 K/uL    Baso (Absolute) 0.00 0.00 - 0.12 K/uL    NRBC (Absolute) 0.00 K/uL    Anisocytosis 1+     Macrocytosis 1+    Comp Metabolic Panel    Collection Time: 03/20/25  2:30 PM   Result Value Ref Range    Sodium 139 135 - 145 mmol/L    Potassium 3.7 3.6 - 5.5 mmol/L    Chloride 99 96 - 112 mmol/L    Co2 13 (L) 20 - 33 mmol/L    Anion Gap 27.0 (H) 7.0 - 16.0    Glucose 124 (H) 65 - 99 mg/dL    Bun 15 8 - 22 mg/dL    Creatinine 0.57 0.50 - 1.40 mg/dL    Calcium 9.3 8.5 - 10.5 mg/dL    Correct Calcium 9.1 8.5 - 10.5 mg/dL    AST(SGOT) 190 (H) 12 - 45 U/L    ALT(SGPT) 74 (H) 2 - 50 U/L    Alkaline Phosphatase 71 30 - 99 U/L    Total Bilirubin 0.6 0.1 - 1.5 mg/dL    Albumin 4.3 3.2 - 4.9 g/dL    Total Protein 7.3 6.0 - 8.2 g/dL    Globulin 3.0 1.9 - 3.5 g/dL    A-G Ratio 1.4 g/dL   DIAGNOSTIC ALCOHOL     Collection Time: 25  2:30 PM   Result Value Ref Range    Diagnostic Alcohol 20.5 (H) <10.1 mg/dL   ESTIMATED GFR    Collection Time: 25  2:30 PM   Result Value Ref Range    GFR (CKD-EPI) 119 >60 mL/min/1.73 m 2   DIFFERENTIAL MANUAL    Collection Time: 25  2:30 PM   Result Value Ref Range    Bands-Stabs 0.90 0.00 - 10.00 %    Manual Diff Status PERFORMED    PERIPHERAL SMEAR REVIEW    Collection Time: 25  2:30 PM   Result Value Ref Range    Peripheral Smear Review see below    PLATELET ESTIMATE    Collection Time: 25  2:30 PM   Result Value Ref Range    Plt Estimation Normal    MORPHOLOGY    Collection Time: 25  2:30 PM   Result Value Ref Range    RBC Morphology Present    IMMATURE PLT FRACTION    Collection Time: 25  2:30 PM   Result Value Ref Range    Imm. Plt Fraction 3.7 0.6 - 13.1 %   EKG    Collection Time: 25  2:31 PM   Result Value Ref Range    Report       [facility] Emergency Dept.    Test Date:  2025  Pt Name:    NICOLA SEYMOUR                   Department: ER  MRN:        [id number]                      Room:       [facility]  Gender:     Female                       Technician: [id number]  :        1985                   Requested By:ER TRIAGE PROTOCOL  Order #:    [id number]                    Reading MD:    Measurements  Intervals                                Axis  Rate:       89                           P:          -11  RI:         154                          QRS:        33  QRSD:       86                           T:          24  QT:         378  QTc:        460    Interpretive Statements  Sinus rhythm  Compared to ECG 2018 16:35:37  No significant changes         I have independently interpreted this EKG    RADIOLOGY/PROCEDURES   I have independently interpreted the diagnostic imaging associated with this visit and am waiting the final reading from the radiologist.   My preliminary interpretation is as follows: No acute  facial fracture or intracranial hemorrhage or skull fracture    Radiologist interpretation:  CT-MAXILLOFACIAL W/O PLUS RECONS   Final Result      Negative maxillofacial/paranasal sinuses CT scan without contrast.      CT-HEAD W/O   Final Result      Normal CT of the head without IV contrast.                   COURSE & MEDICAL DECISION MAKING    ASSESSMENT, COURSE AND PLAN  Care Narrative:     This is a very challenging 39-year-old female who has both known epilepsy as well as alcoholism who presented after a seizure.  She is postictal but then with clearing on arrival.  She endorsed that she had not taken her Keppra this morning.  She likely heavily drink last night as he still some alcohol in her system.  She also had trauma to her head and face for which an extensive evaluation was performed.  CT scan of the head and face fortunately does not demonstrate any acute fractures or intracranial hemorrhage.  Alcohol level is still slightly elevated and she has evidence of elevated MCV and alcohol ketoacidosis with elevated LFTs consistent with alcohol abuse.  She was given IV fluids as well as several doses of IV Ativan.  I suspect that she is primarily experiencing alcohol withdrawal and may have had a mixed seizure earlier today related to Keppra noncompliance and alcohol withdrawal.  After treatment here she was still quite tremulous.  We attempted to ambulate the patient and she was too unsteady on her feet and tremulous therefore I feel that admission to the hospital for treatment of acute alcohol withdrawal syndrome is indicated.  She was given an IV load of Keppra 1000 mg    Hydration: Based on the patient's presentation of Acute Vomiting the patient was given IV fluids. IV Hydration was used because oral hydration was not adequate alone. Upon recheck following hydration, the patient was improving.          ADDITIONAL PROBLEMS MANAGED      DISPOSITION AND DISCUSSIONS  I have discussed management of the patient  with the following physicians and LISET's: Consultation with hospital service was obtained for admission    Discussion of management with other Q or appropriate source(s): None    Escalation of care considered, and ultimately not performed: Considered Precedex and/or phenobarbital infusion    Barriers to care at this time, including but not limited to: No PCP.     Decision tools and prescription drugs considered including, but not limited to: None.    FINAL DIAGNOSIS  1. Seizure disorder (HCC)        2. Alcohol withdrawal syndrome with complication (HCC)               Electronically signed by: Dexter Austin M.D., 3/20/2025 2:33 PM

## 2025-03-20 NOTE — ED NOTES
Patient resting calmly on gurney. Reports improvement in nausea. Awaiting CT. Call light within reach.

## 2025-03-21 ENCOUNTER — APPOINTMENT (OUTPATIENT)
Dept: RADIOLOGY | Facility: MEDICAL CENTER | Age: 40
DRG: 894 | End: 2025-03-21
Attending: HOSPITALIST
Payer: COMMERCIAL

## 2025-03-21 VITALS
RESPIRATION RATE: 17 BRPM | HEIGHT: 65 IN | HEART RATE: 87 BPM | SYSTOLIC BLOOD PRESSURE: 126 MMHG | OXYGEN SATURATION: 94 % | BODY MASS INDEX: 20.75 KG/M2 | WEIGHT: 124.56 LBS | DIASTOLIC BLOOD PRESSURE: 87 MMHG | TEMPERATURE: 97.3 F

## 2025-03-21 LAB
ALBUMIN SERPL BCP-MCNC: 3.9 G/DL (ref 3.2–4.9)
ALBUMIN/GLOB SERPL: 1.4 G/DL
ALP SERPL-CCNC: 63 U/L (ref 30–99)
ALT SERPL-CCNC: 57 U/L (ref 2–50)
ANION GAP SERPL CALC-SCNC: 13 MMOL/L (ref 7–16)
APPEARANCE UR: CLEAR
AST SERPL-CCNC: 141 U/L (ref 12–45)
BACTERIA #/AREA URNS HPF: ABNORMAL /HPF
BASOPHILS # BLD AUTO: 0.6 % (ref 0–1.8)
BASOPHILS # BLD: 0.03 K/UL (ref 0–0.12)
BILIRUB SERPL-MCNC: 1 MG/DL (ref 0.1–1.5)
BILIRUB UR QL STRIP.AUTO: NEGATIVE
BUN SERPL-MCNC: 13 MG/DL (ref 8–22)
CALCIUM ALBUM COR SERPL-MCNC: 9.5 MG/DL (ref 8.5–10.5)
CALCIUM SERPL-MCNC: 9.4 MG/DL (ref 8.5–10.5)
CASTS URNS QL MICRO: ABNORMAL /LPF (ref 0–2)
CHLORIDE SERPL-SCNC: 98 MMOL/L (ref 96–112)
CO2 SERPL-SCNC: 25 MMOL/L (ref 20–33)
COLOR UR: ABNORMAL
CREAT SERPL-MCNC: 0.55 MG/DL (ref 0.5–1.4)
EOSINOPHIL # BLD AUTO: 0.07 K/UL (ref 0–0.51)
EOSINOPHIL NFR BLD: 1.5 % (ref 0–6.9)
EPITHELIAL CELLS 1715: ABNORMAL /HPF (ref 0–5)
ERYTHROCYTE [DISTWIDTH] IN BLOOD BY AUTOMATED COUNT: 53 FL (ref 35.9–50)
FOLATE SERPL-MCNC: 15.4 NG/ML
GFR SERPLBLD CREATININE-BSD FMLA CKD-EPI: 120 ML/MIN/1.73 M 2
GLOBULIN SER CALC-MCNC: 2.8 G/DL (ref 1.9–3.5)
GLUCOSE SERPL-MCNC: 101 MG/DL (ref 65–99)
GLUCOSE UR STRIP.AUTO-MCNC: NEGATIVE MG/DL
HCT VFR BLD AUTO: 32.9 % (ref 37–47)
HGB BLD-MCNC: 11 G/DL (ref 12–16)
IMM GRANULOCYTES # BLD AUTO: 0.03 K/UL (ref 0–0.11)
IMM GRANULOCYTES NFR BLD AUTO: 0.6 % (ref 0–0.9)
KETONES UR STRIP.AUTO-MCNC: ABNORMAL MG/DL
LEUKOCYTE ESTERASE UR QL STRIP.AUTO: ABNORMAL
LYMPHOCYTES # BLD AUTO: 1.1 K/UL (ref 1–4.8)
LYMPHOCYTES NFR BLD: 23.5 % (ref 22–41)
MAGNESIUM SERPL-MCNC: 1.2 MG/DL (ref 1.5–2.5)
MAGNESIUM SERPL-MCNC: 1.2 MG/DL (ref 1.5–2.5)
MAGNESIUM SERPL-MCNC: 2.4 MG/DL (ref 1.5–2.5)
MCH RBC QN AUTO: 34.8 PG (ref 27–33)
MCHC RBC AUTO-ENTMCNC: 33.4 G/DL (ref 32.2–35.5)
MCV RBC AUTO: 104.1 FL (ref 81.4–97.8)
MICRO URNS: ABNORMAL
MONOCYTES # BLD AUTO: 0.27 K/UL (ref 0–0.85)
MONOCYTES NFR BLD AUTO: 5.8 % (ref 0–13.4)
NEUTROPHILS # BLD AUTO: 3.18 K/UL (ref 1.82–7.42)
NEUTROPHILS NFR BLD: 68 % (ref 44–72)
NITRITE UR QL STRIP.AUTO: NEGATIVE
NRBC # BLD AUTO: 0 K/UL
NRBC BLD-RTO: 0 /100 WBC (ref 0–0.2)
PH UR STRIP.AUTO: 8.5 [PH] (ref 5–8)
PHOSPHATE SERPL-MCNC: 3.3 MG/DL (ref 2.5–4.5)
PLATELET # BLD AUTO: 63 K/UL (ref 164–446)
PLATELETS.RETICULATED NFR BLD AUTO: 4.6 % (ref 0.6–13.1)
PMV BLD AUTO: 10 FL (ref 9–12.9)
POTASSIUM SERPL-SCNC: 3.7 MMOL/L (ref 3.6–5.5)
PROT SERPL-MCNC: 6.7 G/DL (ref 6–8.2)
PROT UR QL STRIP: 30 MG/DL
RBC # BLD AUTO: 3.16 M/UL (ref 4.2–5.4)
RBC # URNS HPF: ABNORMAL /HPF (ref 0–2)
RBC UR QL AUTO: NEGATIVE
SODIUM SERPL-SCNC: 136 MMOL/L (ref 135–145)
SP GR UR STRIP.AUTO: 1.02
TSH SERPL DL<=0.005 MIU/L-ACNC: 1.23 UIU/ML (ref 0.38–5.33)
UROBILINOGEN UR STRIP.AUTO-MCNC: 1 EU/DL
VIT B12 SERPL-MCNC: 523 PG/ML (ref 211–911)
WBC # BLD AUTO: 4.7 K/UL (ref 4.8–10.8)
WBC #/AREA URNS HPF: ABNORMAL /HPF

## 2025-03-21 PROCEDURE — 85025 COMPLETE CBC W/AUTO DIFF WBC: CPT

## 2025-03-21 PROCEDURE — 81001 URINALYSIS AUTO W/SCOPE: CPT

## 2025-03-21 PROCEDURE — 82607 VITAMIN B-12: CPT

## 2025-03-21 PROCEDURE — 85055 RETICULATED PLATELET ASSAY: CPT

## 2025-03-21 PROCEDURE — 80053 COMPREHEN METABOLIC PANEL: CPT

## 2025-03-21 PROCEDURE — 84443 ASSAY THYROID STIM HORMONE: CPT

## 2025-03-21 PROCEDURE — 84100 ASSAY OF PHOSPHORUS: CPT

## 2025-03-21 PROCEDURE — 99238 HOSP IP/OBS DSCHRG MGMT 30/<: CPT | Performed by: HOSPITALIST

## 2025-03-21 PROCEDURE — 83735 ASSAY OF MAGNESIUM: CPT

## 2025-03-21 PROCEDURE — A9270 NON-COVERED ITEM OR SERVICE: HCPCS | Performed by: STUDENT IN AN ORGANIZED HEALTH CARE EDUCATION/TRAINING PROGRAM

## 2025-03-21 PROCEDURE — 82746 ASSAY OF FOLIC ACID SERUM: CPT

## 2025-03-21 PROCEDURE — 700105 HCHG RX REV CODE 258

## 2025-03-21 PROCEDURE — 36415 COLL VENOUS BLD VENIPUNCTURE: CPT

## 2025-03-21 PROCEDURE — 700111 HCHG RX REV CODE 636 W/ 250 OVERRIDE (IP)

## 2025-03-21 PROCEDURE — 700102 HCHG RX REV CODE 250 W/ 637 OVERRIDE(OP): Performed by: STUDENT IN AN ORGANIZED HEALTH CARE EDUCATION/TRAINING PROGRAM

## 2025-03-21 RX ORDER — MULTIVITAMIN WITH IRON
1000 TABLET ORAL DAILY
Status: CANCELLED | OUTPATIENT
Start: 2025-03-21

## 2025-03-21 RX ORDER — MAGNESIUM SULFATE HEPTAHYDRATE 40 MG/ML
4 INJECTION, SOLUTION INTRAVENOUS ONCE
Status: COMPLETED | OUTPATIENT
Start: 2025-03-21 | End: 2025-03-21

## 2025-03-21 RX ORDER — SODIUM CHLORIDE 9 MG/ML
INJECTION, SOLUTION INTRAVENOUS CONTINUOUS
Status: DISCONTINUED | OUTPATIENT
Start: 2025-03-21 | End: 2025-03-21 | Stop reason: HOSPADM

## 2025-03-21 RX ADMIN — FOLIC ACID 1 MG: 1 TABLET ORAL at 05:41

## 2025-03-21 RX ADMIN — SODIUM CHLORIDE: 9 INJECTION, SOLUTION INTRAVENOUS at 13:18

## 2025-03-21 RX ADMIN — LEVETIRACETAM 1000 MG: 500 TABLET, FILM COATED ORAL at 05:41

## 2025-03-21 RX ADMIN — MAGNESIUM SULFATE HEPTAHYDRATE 4 G: 4 INJECTION, SOLUTION INTRAVENOUS at 09:07

## 2025-03-21 RX ADMIN — Medication 100 MG: at 05:41

## 2025-03-21 RX ADMIN — NICOTINE 7 MG: 7 PATCH TRANSDERMAL at 05:41

## 2025-03-21 RX ADMIN — LORAZEPAM 1 MG: 1 TABLET ORAL at 03:51

## 2025-03-21 ASSESSMENT — LIFESTYLE VARIABLES
ORIENTATION AND CLOUDING OF SENSORIUM: ORIENTED AND CAN DO SERIAL ADDITIONS
AGITATION: NORMAL ACTIVITY
AUDITORY DISTURBANCES: NOT PRESENT
VISUAL DISTURBANCES: NOT PRESENT
NAUSEA AND VOMITING: NO NAUSEA AND NO VOMITING
HEADACHE, FULLNESS IN HEAD: NOT PRESENT
HEADACHE, FULLNESS IN HEAD: NOT PRESENT
AUDITORY DISTURBANCES: NOT PRESENT
TREMOR: NO TREMOR
TREMOR: NO TREMOR
TOTAL SCORE: 1
AGITATION: SOMEWHAT MORE THAN NORMAL ACTIVITY
PAROXYSMAL SWEATS: BARELY PERCEPTIBLE SWEATING. PALMS MOIST
VISUAL DISTURBANCES: NOT PRESENT
NAUSEA AND VOMITING: NO NAUSEA AND NO VOMITING
ORIENTATION AND CLOUDING OF SENSORIUM: ORIENTED AND CAN DO SERIAL ADDITIONS
AUDITORY DISTURBANCES: NOT PRESENT
PAROXYSMAL SWEATS: BARELY PERCEPTIBLE SWEATING. PALMS MOIST
NAUSEA AND VOMITING: MILD NAUSEA WITH NO VOMITING
ORIENTATION AND CLOUDING OF SENSORIUM: ORIENTED AND CAN DO SERIAL ADDITIONS
TOTAL SCORE: 9
TOTAL SCORE: 2
PAROXYSMAL SWEATS: NO SWEAT VISIBLE
ANXIETY: MILDLY ANXIOUS
AGITATION: NORMAL ACTIVITY
ANXIETY: *
VISUAL DISTURBANCES: NOT PRESENT
TREMOR: MODERATE TREMOR WITH ARMS EXTENDED
ANXIETY: MILDLY ANXIOUS
HEADACHE, FULLNESS IN HEAD: NOT PRESENT

## 2025-03-21 ASSESSMENT — PATIENT HEALTH QUESTIONNAIRE - PHQ9
1. LITTLE INTEREST OR PLEASURE IN DOING THINGS: NOT AT ALL
SUM OF ALL RESPONSES TO PHQ9 QUESTIONS 1 AND 2: 0
2. FEELING DOWN, DEPRESSED, IRRITABLE, OR HOPELESS: NOT AT ALL

## 2025-03-21 ASSESSMENT — FIBROSIS 4 INDEX: FIB4 SCORE: 11.26

## 2025-03-21 ASSESSMENT — PAIN DESCRIPTION - PAIN TYPE
TYPE: ACUTE PAIN

## 2025-03-21 NOTE — ED NOTES
Phone report by Student CAROL Georges to [phone] CAROL David. Patient transport request ordered for basic. Patient resting calmly at this time.

## 2025-03-21 NOTE — ED NOTES
Patient reported increased anxiety, visual hallucinations, and is tremulous. Orders obtained. Medicated per MAR.

## 2025-03-21 NOTE — ED NOTES
Rounded on patient. Patient resting in bed. Ice chips provided, okay per Dr. Austin. Call light within reach.

## 2025-03-21 NOTE — PROGRESS NOTES
Franchesca Ortez has chosen to leave the hospital against medical advice. The attending physician has not discharged the patient. The provider is aware that the patient is leaving against medical advice. Patient expresses understanding of the risks of leaving the hospital and benefits of admission including but not limited to, the availability and proximity of nurses, physicians, monitoring, diagnostic testing, treatment, and a safe discharge plan. The patient had the opportunity to ask questions about their medical condition and recommended treatment.  Patient is aware that they may return for care at any time.

## 2025-03-21 NOTE — PROGRESS NOTES
"Pt expressed to this writer that she wanted to get \"out of here\", and \"felt like a prisoner\". Discussed importance of allowing lab to check magnesium levels d/t critical low level this AM of 1.2. Pt agreed to have lab work done. When discussing the MRI of the brain with the patient at that time, pt also agreed to have the MRI completed, but wanted to go home after the MRI. Dr. Edwards to bedside to discuss plan of care with pt, and also discussed pt waiting to be discharged until medically stable, with pt not agreeing with the plan of care and wanting to leave AMA. Dr. Edwards advised against leaving AMA d/t risks of seizures r/t ETOH withdrawal and electrolyte imbalance, as well as importance of having MRI of the Brain completed, with pt still wanting to leave AMA. This writer educated pt on importance of looking at MyChart to evaluate current lab work done for magnesium level, and to follow up with pt's PCP as outpatient, with pt verbalizing understanding and agreeing to f/u with PCP regarding this hospitalization. Pt also indicated understanding that her next Keppra dose was due this evening, and stated she would take her Keppra \"around dinner time\". Pt ultimately left AMA, and ambulated off of SMT independently w/o complication. All personal belongings gathered and taken home with pt.      "

## 2025-03-21 NOTE — ASSESSMENT & PLAN NOTE
Noted to have a seizure episode today, unclear as to whether it is from alcohol withdrawal versus breakthrough seizures  Loaded with Keppra 1 g in ER, continue home dose of Keppra 1 g p.o. twice daily

## 2025-03-21 NOTE — ASSESSMENT & PLAN NOTE
Patient currently withdrawing from alcohol, low threshold to start Precedex drip  CIWA protocol  Folic acid thiamine  Monitor for DTs

## 2025-03-21 NOTE — ED NOTES
Medication history reviewed with patient at bedside.   Med rec is complete  Allergies reviewed.     Patient has not had any outpatient antibiotics in the last 30 days.   Anticoagulants: No  Dispense history available in EPIC: No    Glenny Arrieta

## 2025-03-21 NOTE — PROGRESS NOTES
Telemetry Report:        Per Telestrip from Monitor Room     Rhythm: SR 63-70    Ectopy:    UT: .18  QRS: .07  Qt: .37

## 2025-03-21 NOTE — PROGRESS NOTES
[facility] Internal Medicine Daily Progress Note    Date of Service  3/21/2025    R Team: R IM Green Team   Attending: RENATO Watson M.d.  Senior Resident: Noe Aly  Intern:  Aurelio Vega   Contact Number: [phone]    Pérez Peoples is a 40 yo female who was initially admitted due to seizures, Hx of alcohol abuse and having alcohol withdrawal symptoms reason why she was been treated with Precedex drip. Patient also has Hx of brain mass which has not fully been explored and evaluated.   The patient decided to leave AMA, it was explained the risk and complications like new seizures from alcohol withdrawal vs brain mass, also her Mg was low and was been replete and was advised to check this before leaving because it was another cause of seizures. The patient even with advice decided to leave AMA.   It was also recommended to wait for medication like keppra for seizures but she said she has at home and didn't want to wait.  It was advised to wait for MRI to check for the brain mass but she didn't want to wait.

## 2025-03-21 NOTE — ASSESSMENT & PLAN NOTE
6 minutes spent on tobacco cessation.  Currently smokes 10 to 15 cigarettes a day.  She was counseled on benefits of cessation.  She was offered nicotine replacement therapy, to which she states that she would like a nicotine patch.

## 2025-03-21 NOTE — H&P
Hospital Medicine History & Physical Note    Date of Service  3/20/2025    Primary Care Physician  Pcp Pt States None    Consultants  None    Code Status  Full Code    Chief Complaint  Chief Complaint   Patient presents with    Seizure     BIB EMS from home for seizure lasting approx 90 seconds per mom who witnessed. Takes Keppra twice daily, did not take morning dose. AAO x 2 on EMS arrival, AAO x 4 on ER arrival. Hx brain tumor and alcohol abuse, cannot remember if she drank alcohol yesterday       History of Presenting Illness  Franchesca Ortez is a 39 y.o. female who presented 3/20/2025 with a tonic-clonic seizure at home.  Patient has a history of alcohol abuse, seizure disorder, tobacco use.  She has been drinking 3 pints of alcohol a day.  Last alcoholic beverage about 2 days ago.  Today, she was noted to have a witnessed seizure at home.  Her mom who witnessed it described as a tonic-clonic seizure and lasted about 90 seconds before spontaneously resolving.  She was brought to the ER for further evaluation.    In ER, patient found to have bicarbonate 13, high anion gap metabolic acidosis 27, transaminitis, alcohol level 20.  CT head and maxillofacial unremarkable.  EKG shows normal sinus rhythm with no ischemic changes.    I discussed the plan of care with patient.    Review of Systems  ROS    Past Medical History   has a past medical history of Alcohol abuse, Depression, History of suicide attempt, and Seizure (HCC).    Surgical History   has a past surgical history that includes gyn surgery and primary c section.     Family History  family history includes Cancer in her maternal grandmother; Diabetes in her father; Hypertension in her father.   Family history reviewed with patient. There is no family history that is pertinent to the chief complaint.     Social History   reports that she has been smoking cigarettes. She has a 7.5 pack-year smoking history. She has never used smokeless tobacco. She  reports current alcohol use. She reports current drug use. Drug: Inhaled.    Allergies  No Known Allergies    Medications  Prior to Admission Medications   Prescriptions Last Dose Informant Patient Reported? Taking?   levETIRAcetam (KEPPRA) 500 MG Tab 3/19/2025 Bedtime Patient No Yes   Sig: Take 2 tablets (1000 mg) orally in the morning and 2 tablets (1000 mg)  in the evening about 12 hours apart.   Patient taking differently: Take 1,000 mg by mouth 2 times a day. Take 2 tablets (1000 mg) orally in the morning and 2 tablets (1000 mg)  in the evening about 12 hours apart.      Facility-Administered Medications: None       Physical Exam  Temp:  [36.5 °C (97.7 °F)] 36.5 °C (97.7 °F)  Pulse:  [71-86] 80  Resp:  [14-23] 15  BP: (109-126)/(67-83) 124/83  SpO2:  [96 %-99 %] 96 %  Blood Pressure: 124/83   Temperature: 36.5 °C (97.7 °F)   Pulse: 80   Respiration: 15   Pulse Oximetry: 96 %       Physical Exam  Constitutional:       Appearance: Normal appearance. She is normal weight.   HENT:      Head: Normocephalic.      Nose: Nose normal.      Mouth/Throat:      Mouth: Mucous membranes are moist.      Comments: Tongue bite noted on left side  Eyes:      Pupils: Pupils are equal, round, and reactive to light.   Cardiovascular:      Rate and Rhythm: Normal rate and regular rhythm.      Pulses: Normal pulses.   Pulmonary:      Effort: Pulmonary effort is normal.      Breath sounds: Normal breath sounds.   Abdominal:      General: Abdomen is flat. Bowel sounds are normal.      Palpations: Abdomen is soft.   Musculoskeletal:         General: Normal range of motion.      Cervical back: Neck supple.      Comments: Tremors noted in upper extremities bilaterally   Skin:     General: Skin is warm.   Neurological:      General: No focal deficit present.      Mental Status: She is alert and oriented to person, place, and time. Mental status is at baseline.   Psychiatric:         Mood and Affect: Mood normal.         Behavior: Behavior  "normal.         Thought Content: Thought content normal.         Judgment: Judgment normal.         Laboratory:  Recent Labs     03/20/25  1430   WBC 5.7   RBC 3.32*   HEMOGLOBIN 11.9*   HEMATOCRIT 34.2*   .0*   MCH 35.8*   MCHC 34.8   RDW 52.6*   PLATELETCT 85*   MPV 9.6     Recent Labs     03/20/25  1430   SODIUM 139   POTASSIUM 3.7   CHLORIDE 99   CO2 13*   GLUCOSE 124*   BUN 15   CREATININE 0.57   CALCIUM 9.3     Recent Labs     03/20/25  1430   ALTSGPT 74*   ASTSGOT 190*   ALKPHOSPHAT 71   TBILIRUBIN 0.6   GLUCOSE 124*         No results for input(s): \"NTPROBNP\" in the last 72 hours.      No results for input(s): \"TROPONINT\" in the last 72 hours.    Imaging:  CT-MAXILLOFACIAL W/O PLUS RECONS   Final Result      Negative maxillofacial/paranasal sinuses CT scan without contrast.      CT-HEAD W/O   Final Result      Normal CT of the head without IV contrast.                   EKG:  I have personally reviewed the images and compared with prior images.    Assessment/Plan:  Justification for Admission Status  I anticipate this patient will require at least two midnights for appropriate medical management, necessitating inpatient admission because patient has alcohol withdrawal    Patient will need a Telemetry bed on EMERGENCY service .  The need is secondary to alcohol withdrawal.    * Alcohol withdrawal (HCC)- (present on admission)  Assessment & Plan  Patient currently withdrawing from alcohol, low threshold to start Precedex drip  CIWA protocol  Folic acid thiamine  Monitor for DTs    Alcoholic hepatitis  Assessment & Plan  Found to have transaminitis consistent with alcoholic liver disease  Trend LFTs    Breakthrough seizure (HCC)- (present on admission)  Assessment & Plan  Noted to have a seizure episode today, unclear as to whether it is from alcohol withdrawal versus breakthrough seizures  Loaded with Keppra 1 g in ER, continue home dose of Keppra 1 g p.o. twice daily      Tobacco abuse- (present on " admission)  Assessment & Plan  6 minutes spent on tobacco cessation.  Currently smokes 10 to 15 cigarettes a day.  She was counseled on benefits of cessation.  She was offered nicotine replacement therapy, to which she states that she would like a nicotine patch.        VTE prophylaxis: enoxaparin ppx

## 2025-03-21 NOTE — PROGRESS NOTES
4 Eyes Skin Assessment Completed by CAROL David and CAROL Thakkar.    Head Edema with left facial swelling  Ears WDL  Nose WDL  Mouth WDL  Neck WDL  Breast/Chest WDL  Shoulder Blades WDL  Spine WDL  (R) Arm/Elbow/Hand WDL  (L) Arm/Elbow/Hand WDL  Abdomen WDL  Groin WDL  Scrotum/Coccyx/Buttocks WDL  (R) Leg WDL  (L) Leg WDL  (R) Heel/Foot/Toe WDL  (L) Heel/Foot/Toe WDL          Devices In Places Blood Pressure Cuff, Pulse Ox, and SCD's      Interventions In Place Pillows    Possible Skin Injury No    Pictures Uploaded Into Epic N/A  Wound Consult Placed N/A  RN Wound Prevention Protocol Ordered No

## 2025-03-21 NOTE — CARE PLAN
The patient is Stable - Low risk of patient condition declining or worsening    Shift Goals  Clinical Goals: CIWA  Patient Goals: CIWA  Family Goals: ELBA    Progress made toward(s) clinical / shift goals:    Problem: Knowledge Deficit - Standard  Goal: Patient and family/care givers will demonstrate understanding of plan of care, disease process/condition, diagnostic tests and medications  Outcome: Progressing     Problem: Optimal Care for Alcohol Withdrawal  Goal: Optimal Care for the alcohol withdrawal patient  Outcome: Progressing     NOTE: patient stable and understands Tx plan   Patient is not progressing towards the following goals:

## 2025-03-22 NOTE — DISCHARGE SUMMARY
[facility] Discharge Summary    Attending: RENATO Watson M.d.  Senior Resident: Dr. Edwards  Intern:  Dr. Lorenzo  Contact Number: [phone]    CHIEF COMPLAINT ON ADMISSION  Chief Complaint   Patient presents with    Seizure     BIB EMS from home for seizure lasting approx 90 seconds per mom who witnessed. Takes Keppra twice daily, did not take morning dose. AAO x 2 on EMS arrival, AAO x 4 on ER arrival. Hx brain tumor and alcohol abuse, cannot remember if she drank alcohol yesterday       Reason for Admission  Breakthrough seizure (HCC)     Admission Date  3/20/2025    CODE STATUS  Full    HPI & HOSPITAL COURSE  Franchesca Ortez is a 39-year-old female with past medical history of alcohol abuse, seizure disorder, history of cavernoma observed on prior imaging, and tobacco use who was admitted 3/20/2025 due to witnessed tonic-clonic seizure at home.  At time of admission, patient had reported drinking 3 pints of alcohol daily with last drink being 2 days prior to admission.  Patient also reported in the ED possibility of missing morning dose of home Keppra.  Tonic-clonic seizure was witnessed by mother reportedly lasted for about 90 seconds.  Initial workup demonstrated bicarbonate 13, high anion gap metabolic acidosis 27, transaminitis, alcohol level 20, and Magnesium 1.2 which was repleted.  CT head and maxillofacial unremarkable.  EKG shows normal sinus rhythm with no ischemic changes.  Patient placed on CIWA protocol for potential of alcohol withdrawal related seizures of which patient has history of and home Keppra was resumed.  Morning labs demonstrated resolved anion gap acidosis with increase in bicarbonate in normal range and improved transaminitis.  Repeat magnesium 2.1.  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  Patient reported she has not been evaluated by neurosurgery in the past however does follow-up with neurology.  MRI  brain was planned for monitoring of cavernoma as possible cause for presenting symptoms.    On 3/21, team was notified of patient's desire to leave AGAINST MEDICAL ADVICE.  Risks and complications of leaving as well as benefits of admission were explained with patient demonstrating understanding.  Patient was also advised on necessity for obtaining MRI brain for monitoring of cavernoma however patient declined waiting for imaging.  Patient was alert and oriented to self, location, time, and events surrounding her admission.  She was advised close follow-up with her usual neurologist Dr. Raya soon after discharge.    Therefore, she is discharged AGAINST MEDICAL ADVICE  The patient met 2-midnight criteria for an inpatient stay at the time of discharge.    Discharge Date  3/21/2025    Physical Exam on Day of Discharge  Physical Exam  Vitals reviewed.   Constitutional:       General: She is not in acute distress.     Appearance: Normal appearance. She is not ill-appearing.   HENT:      Head: Normocephalic and atraumatic.      Mouth/Throat:      Mouth: Mucous membranes are moist.      Pharynx: Oropharynx is clear.   Eyes:      Extraocular Movements: Extraocular movements intact.      Conjunctiva/sclera: Conjunctivae normal.      Pupils: Pupils are equal, round, and reactive to light.   Cardiovascular:      Rate and Rhythm: Normal rate and regular rhythm.      Pulses: Normal pulses.      Heart sounds: Normal heart sounds. No murmur heard.  Pulmonary:      Effort: Pulmonary effort is normal. No respiratory distress.      Breath sounds: Normal breath sounds.   Abdominal:      General: Bowel sounds are normal. There is no distension.      Palpations: Abdomen is soft.      Tenderness: There is no abdominal tenderness. There is no guarding.   Musculoskeletal:         General: Normal range of motion.      Right lower leg: No edema.      Left lower leg: No edema.   Skin:     General: Skin is warm and dry.   Neurological:       General: No focal deficit present.      Mental Status: She is alert and oriented to person, place, and time.      Cranial Nerves: No cranial nerve deficit.         FOLLOW UP ITEMS POST DISCHARGE  Follow-up with outpatient neurologist regarding seizures and cavernoma    DISCHARGE DIAGNOSES  Principal Problem:    Alcohol withdrawal (HCC) (POA: Yes)  Active Problems:    Tobacco abuse (POA: Yes)    Breakthrough seizure (HCC) (POA: Yes)    Alcoholic hepatitis (POA: Unknown)  Resolved Problems:    Seizure disorder (HCC) (POA: Unknown)      FOLLOW UP  No future appointments.  Pcp Pt States None            MEDICATIONS ON DISCHARGE     Medication List        ASK your doctor about these medications        Instructions   levETIRAcetam 500 MG Tabs  Commonly known as: Keppra  Ask about: Which instructions should I use?   Take 2 tablets (1000 mg) orally in the morning and 2 tablets (1000 mg)  in the evening about 12 hours apart.              Allergies  No Known Allergies    DIET  No orders of the defined types were placed in this encounter.      ACTIVITY  As tolerated.  Weight bearing as tolerated    CONSULTATIONS  None    PROCEDURES  None    LABORATORY  Lab Results   Component Value Date    SODIUM 136 03/21/2025    POTASSIUM 3.7 03/21/2025    CHLORIDE 98 03/21/2025    CO2 25 03/21/2025    GLUCOSE 101 (H) 03/21/2025    BUN 13 03/21/2025    CREATININE 0.55 03/21/2025        Lab Results   Component Value Date    WBC 4.7 (L) 03/21/2025    HEMOGLOBIN 11.0 (L) 03/21/2025    HEMATOCRIT 32.9 (L) 03/21/2025    PLATELETCT 63 (L) 03/21/2025        Total time of the discharge process exceeds 35 minutes.

## 2025-04-11 ENCOUNTER — APPOINTMENT (OUTPATIENT)
Dept: NEUROLOGY | Facility: MEDICAL CENTER | Age: 40
End: 2025-04-11
Attending: STUDENT IN AN ORGANIZED HEALTH CARE EDUCATION/TRAINING PROGRAM
Payer: COMMERCIAL

## 2025-04-14 ENCOUNTER — HOSPITAL ENCOUNTER (INPATIENT)
Facility: MEDICAL CENTER | Age: 40
LOS: 1 days | DRG: 894 | End: 2025-04-15
Attending: EMERGENCY MEDICINE | Admitting: INTERNAL MEDICINE
Payer: COMMERCIAL

## 2025-04-14 DIAGNOSIS — G40.919 BREAKTHROUGH SEIZURE (HCC): ICD-10-CM

## 2025-04-14 DIAGNOSIS — Z72.0 TOBACCO ABUSE: ICD-10-CM

## 2025-04-14 DIAGNOSIS — R00.0 TACHYCARDIA: ICD-10-CM

## 2025-04-14 DIAGNOSIS — J69.0 ASPIRATION PNEUMONIA, UNSPECIFIED ASPIRATION PNEUMONIA TYPE, UNSPECIFIED LATERALITY, UNSPECIFIED PART OF LUNG (HCC): ICD-10-CM

## 2025-04-14 DIAGNOSIS — E86.0 DEHYDRATION: ICD-10-CM

## 2025-04-14 DIAGNOSIS — F10.930 ALCOHOL WITHDRAWAL SYNDROME WITHOUT COMPLICATION (HCC): ICD-10-CM

## 2025-04-14 DIAGNOSIS — G93.89 BRAIN MASS: ICD-10-CM

## 2025-04-14 DIAGNOSIS — Z71.6 TOBACCO ABUSE COUNSELING: ICD-10-CM

## 2025-04-14 DIAGNOSIS — F10.139 ALCOHOL ABUSE WITH WITHDRAWAL (HCC): ICD-10-CM

## 2025-04-14 DIAGNOSIS — F10.10 ALCOHOL ABUSE: Primary | ICD-10-CM

## 2025-04-14 PROBLEM — D61.818 PANCYTOPENIA (HCC): Status: ACTIVE | Noted: 2022-06-14

## 2025-04-14 LAB
ALBUMIN SERPL BCP-MCNC: 3.8 G/DL (ref 3.2–4.9)
ALBUMIN/GLOB SERPL: 1.1 G/DL
ALP SERPL-CCNC: 104 U/L (ref 30–99)
ALT SERPL-CCNC: 63 U/L (ref 2–50)
AMPHET UR QL SCN: NEGATIVE
ANION GAP SERPL CALC-SCNC: 17 MMOL/L (ref 7–16)
AST SERPL-CCNC: 159 U/L (ref 12–45)
BARBITURATES UR QL SCN: NEGATIVE
BASOPHILS # BLD AUTO: 0.7 % (ref 0–1.8)
BASOPHILS # BLD: 0.08 K/UL (ref 0–0.12)
BENZODIAZ UR QL SCN: POSITIVE
BILIRUB SERPL-MCNC: 1.3 MG/DL (ref 0.1–1.5)
BUN SERPL-MCNC: 12 MG/DL (ref 8–22)
BZE UR QL SCN: POSITIVE
CALCIUM ALBUM COR SERPL-MCNC: 9.3 MG/DL (ref 8.5–10.5)
CALCIUM SERPL-MCNC: 9.1 MG/DL (ref 8.5–10.5)
CANNABINOIDS UR QL SCN: POSITIVE
CHLORIDE SERPL-SCNC: 101 MMOL/L (ref 96–112)
CO2 SERPL-SCNC: 18 MMOL/L (ref 20–33)
CREAT SERPL-MCNC: 0.51 MG/DL (ref 0.5–1.4)
EOSINOPHIL # BLD AUTO: 0.02 K/UL (ref 0–0.51)
EOSINOPHIL NFR BLD: 0.2 % (ref 0–6.9)
ERYTHROCYTE [DISTWIDTH] IN BLOOD BY AUTOMATED COUNT: 51.9 FL (ref 35.9–50)
FENTANYL UR QL: NEGATIVE
GFR SERPLBLD CREATININE-BSD FMLA CKD-EPI: 122 ML/MIN/1.73 M 2
GLOBULIN SER CALC-MCNC: 3.6 G/DL (ref 1.9–3.5)
GLUCOSE SERPL-MCNC: 119 MG/DL (ref 65–99)
HCG SERPL QL: NEGATIVE
HCT VFR BLD AUTO: 38.7 % (ref 37–47)
HGB BLD-MCNC: 13.1 G/DL (ref 12–16)
IMM GRANULOCYTES # BLD AUTO: 0.08 K/UL (ref 0–0.11)
IMM GRANULOCYTES NFR BLD AUTO: 0.7 % (ref 0–0.9)
LYMPHOCYTES # BLD AUTO: 1.85 K/UL (ref 1–4.8)
LYMPHOCYTES NFR BLD: 16 % (ref 22–41)
MAGNESIUM SERPL-MCNC: 1.1 MG/DL (ref 1.5–2.5)
MCH RBC QN AUTO: 35.4 PG (ref 27–33)
MCHC RBC AUTO-ENTMCNC: 33.9 G/DL (ref 32.2–35.5)
MCV RBC AUTO: 104.6 FL (ref 81.4–97.8)
METHADONE UR QL SCN: NEGATIVE
MONOCYTES # BLD AUTO: 0.73 K/UL (ref 0–0.85)
MONOCYTES NFR BLD AUTO: 6.3 % (ref 0–13.4)
NEUTROPHILS # BLD AUTO: 8.83 K/UL (ref 1.82–7.42)
NEUTROPHILS NFR BLD: 76.1 % (ref 44–72)
NRBC # BLD AUTO: 0 K/UL
NRBC BLD-RTO: 0 /100 WBC (ref 0–0.2)
OPIATES UR QL SCN: NEGATIVE
OXYCODONE UR QL SCN: NEGATIVE
PCP UR QL SCN: NEGATIVE
PHOSPHATE SERPL-MCNC: 1.7 MG/DL (ref 2.5–4.5)
PLATELET # BLD AUTO: 105 K/UL (ref 164–446)
PMV BLD AUTO: 9 FL (ref 9–12.9)
POTASSIUM SERPL-SCNC: 4 MMOL/L (ref 3.6–5.5)
PROPOXYPH UR QL SCN: NEGATIVE
PROT SERPL-MCNC: 7.4 G/DL (ref 6–8.2)
RBC # BLD AUTO: 3.7 M/UL (ref 4.2–5.4)
SODIUM SERPL-SCNC: 136 MMOL/L (ref 135–145)
WBC # BLD AUTO: 11.6 K/UL (ref 4.8–10.8)

## 2025-04-14 PROCEDURE — 96374 THER/PROPH/DIAG INJ IV PUSH: CPT

## 2025-04-14 PROCEDURE — 770006 HCHG ROOM/CARE - MED/SURG/GYN SEMI*

## 2025-04-14 PROCEDURE — 83735 ASSAY OF MAGNESIUM: CPT

## 2025-04-14 PROCEDURE — 700102 HCHG RX REV CODE 250 W/ 637 OVERRIDE(OP): Performed by: INTERNAL MEDICINE

## 2025-04-14 PROCEDURE — 700105 HCHG RX REV CODE 258: Performed by: EMERGENCY MEDICINE

## 2025-04-14 PROCEDURE — 85025 COMPLETE CBC W/AUTO DIFF WBC: CPT

## 2025-04-14 PROCEDURE — 80307 DRUG TEST PRSMV CHEM ANLYZR: CPT

## 2025-04-14 PROCEDURE — A9270 NON-COVERED ITEM OR SERVICE: HCPCS | Performed by: INTERNAL MEDICINE

## 2025-04-14 PROCEDURE — 96375 TX/PRO/DX INJ NEW DRUG ADDON: CPT

## 2025-04-14 PROCEDURE — 99223 1ST HOSP IP/OBS HIGH 75: CPT | Performed by: INTERNAL MEDICINE

## 2025-04-14 PROCEDURE — HZ2ZZZZ DETOXIFICATION SERVICES FOR SUBSTANCE ABUSE TREATMENT: ICD-10-PCS | Performed by: INTERNAL MEDICINE

## 2025-04-14 PROCEDURE — 36415 COLL VENOUS BLD VENIPUNCTURE: CPT

## 2025-04-14 PROCEDURE — 80053 COMPREHEN METABOLIC PANEL: CPT

## 2025-04-14 PROCEDURE — 302970 POC BREATHALIZER: Performed by: EMERGENCY MEDICINE

## 2025-04-14 PROCEDURE — 99285 EMERGENCY DEPT VISIT HI MDM: CPT

## 2025-04-14 PROCEDURE — 700105 HCHG RX REV CODE 258: Performed by: INTERNAL MEDICINE

## 2025-04-14 PROCEDURE — 84703 CHORIONIC GONADOTROPIN ASSAY: CPT

## 2025-04-14 PROCEDURE — 84100 ASSAY OF PHOSPHORUS: CPT

## 2025-04-14 PROCEDURE — 700111 HCHG RX REV CODE 636 W/ 250 OVERRIDE (IP): Mod: JZ | Performed by: INTERNAL MEDICINE

## 2025-04-14 RX ORDER — CHLORDIAZEPOXIDE HYDROCHLORIDE 25 MG/1
50 CAPSULE, GELATIN COATED ORAL EVERY 6 HOURS
Status: DISCONTINUED | OUTPATIENT
Start: 2025-04-14 | End: 2025-04-14

## 2025-04-14 RX ORDER — LEVETIRACETAM 500 MG/5ML
1000 INJECTION, SOLUTION, CONCENTRATE INTRAVENOUS EVERY 12 HOURS
Status: DISCONTINUED | OUTPATIENT
Start: 2025-04-14 | End: 2025-04-15 | Stop reason: HOSPADM

## 2025-04-14 RX ORDER — ONDANSETRON 2 MG/ML
4 INJECTION INTRAMUSCULAR; INTRAVENOUS EVERY 4 HOURS PRN
Status: DISCONTINUED | OUTPATIENT
Start: 2025-04-14 | End: 2025-04-15 | Stop reason: HOSPADM

## 2025-04-14 RX ORDER — DIAZEPAM 10 MG/2ML
10 INJECTION, SOLUTION INTRAMUSCULAR; INTRAVENOUS
Status: DISCONTINUED | OUTPATIENT
Start: 2025-04-14 | End: 2025-04-15 | Stop reason: HOSPADM

## 2025-04-14 RX ORDER — PROMETHAZINE HYDROCHLORIDE 25 MG/1
12.5-25 SUPPOSITORY RECTAL EVERY 4 HOURS PRN
Status: DISCONTINUED | OUTPATIENT
Start: 2025-04-14 | End: 2025-04-15 | Stop reason: HOSPADM

## 2025-04-14 RX ORDER — MIDAZOLAM HYDROCHLORIDE 1 MG/ML
2 INJECTION INTRAMUSCULAR; INTRAVENOUS ONCE
Status: DISCONTINUED | OUTPATIENT
Start: 2025-04-14 | End: 2025-04-14

## 2025-04-14 RX ORDER — LORAZEPAM 0.5 MG/1
0.5 TABLET ORAL EVERY 4 HOURS PRN
Status: DISCONTINUED | OUTPATIENT
Start: 2025-04-14 | End: 2025-04-15 | Stop reason: HOSPADM

## 2025-04-14 RX ORDER — LORAZEPAM 1 MG/1
1 TABLET ORAL EVERY 4 HOURS PRN
Status: DISCONTINUED | OUTPATIENT
Start: 2025-04-14 | End: 2025-04-15 | Stop reason: HOSPADM

## 2025-04-14 RX ORDER — DIAZEPAM 10 MG/2ML
5 INJECTION, SOLUTION INTRAMUSCULAR; INTRAVENOUS EVERY 6 HOURS
Status: DISCONTINUED | OUTPATIENT
Start: 2025-04-14 | End: 2025-04-15 | Stop reason: HOSPADM

## 2025-04-14 RX ORDER — LORAZEPAM 2 MG/1
4 TABLET ORAL
Status: DISCONTINUED | OUTPATIENT
Start: 2025-04-14 | End: 2025-04-15 | Stop reason: HOSPADM

## 2025-04-14 RX ORDER — ONDANSETRON 4 MG/1
4 TABLET, ORALLY DISINTEGRATING ORAL EVERY 4 HOURS PRN
Status: DISCONTINUED | OUTPATIENT
Start: 2025-04-14 | End: 2025-04-15 | Stop reason: HOSPADM

## 2025-04-14 RX ORDER — ACETAMINOPHEN 325 MG/1
650 TABLET ORAL EVERY 6 HOURS PRN
Status: DISCONTINUED | OUTPATIENT
Start: 2025-04-14 | End: 2025-04-15 | Stop reason: HOSPADM

## 2025-04-14 RX ORDER — PROMETHAZINE HYDROCHLORIDE 25 MG/1
12.5-25 TABLET ORAL EVERY 4 HOURS PRN
Status: DISCONTINUED | OUTPATIENT
Start: 2025-04-14 | End: 2025-04-15 | Stop reason: HOSPADM

## 2025-04-14 RX ORDER — LORAZEPAM 2 MG/1
2 TABLET ORAL
Status: DISCONTINUED | OUTPATIENT
Start: 2025-04-14 | End: 2025-04-15 | Stop reason: HOSPADM

## 2025-04-14 RX ORDER — SODIUM CHLORIDE 9 MG/ML
1000 INJECTION, SOLUTION INTRAVENOUS ONCE
Status: COMPLETED | OUTPATIENT
Start: 2025-04-14 | End: 2025-04-14

## 2025-04-14 RX ORDER — NICOTINE 21 MG/24HR
21 PATCH, TRANSDERMAL 24 HOURS TRANSDERMAL
Status: DISCONTINUED | OUTPATIENT
Start: 2025-04-15 | End: 2025-04-15 | Stop reason: HOSPADM

## 2025-04-14 RX ORDER — LABETALOL HYDROCHLORIDE 5 MG/ML
10 INJECTION, SOLUTION INTRAVENOUS EVERY 4 HOURS PRN
Status: DISCONTINUED | OUTPATIENT
Start: 2025-04-14 | End: 2025-04-15 | Stop reason: HOSPADM

## 2025-04-14 RX ORDER — CHLORDIAZEPOXIDE HYDROCHLORIDE 25 MG/1
25 CAPSULE, GELATIN COATED ORAL EVERY 6 HOURS
Status: DISCONTINUED | OUTPATIENT
Start: 2025-04-15 | End: 2025-04-14

## 2025-04-14 RX ORDER — LEVETIRACETAM 500 MG/1
1000 TABLET ORAL 2 TIMES DAILY
Status: DISCONTINUED | OUTPATIENT
Start: 2025-04-14 | End: 2025-04-14

## 2025-04-14 RX ORDER — PROCHLORPERAZINE EDISYLATE 5 MG/ML
5-10 INJECTION INTRAMUSCULAR; INTRAVENOUS EVERY 4 HOURS PRN
Status: DISCONTINUED | OUTPATIENT
Start: 2025-04-14 | End: 2025-04-15 | Stop reason: HOSPADM

## 2025-04-14 RX ADMIN — DIAZEPAM 5 MG: 10 INJECTION, SOLUTION INTRAMUSCULAR; INTRAVENOUS at 17:30

## 2025-04-14 RX ADMIN — ACETAMINOPHEN 650 MG: 325 TABLET, FILM COATED ORAL at 20:53

## 2025-04-14 RX ADMIN — LORAZEPAM 3 MG: 2 TABLET ORAL at 16:40

## 2025-04-14 RX ADMIN — AMPICILLIN SODIUM, SULBACTAM SODIUM 3 G: 2; 1 INJECTION, POWDER, FOR SOLUTION INTRAMUSCULAR; INTRAVENOUS at 17:55

## 2025-04-14 RX ADMIN — SODIUM CHLORIDE 1000 ML: 9 INJECTION, SOLUTION INTRAVENOUS at 16:21

## 2025-04-14 RX ADMIN — LEVETIRACETAM 1000 MG: 100 INJECTION, SOLUTION INTRAVENOUS at 17:46

## 2025-04-14 ASSESSMENT — LIFESTYLE VARIABLES
AVERAGE NUMBER OF DAYS PER WEEK YOU HAVE A DRINK CONTAINING ALCOHOL: 5
ORIENTATION AND CLOUDING OF SENSORIUM: ORIENTED AND CAN DO SERIAL ADDITIONS
HEADACHE, FULLNESS IN HEAD: MILD
EVER HAD A DRINK FIRST THING IN THE MORNING TO STEADY YOUR NERVES TO GET RID OF A HANGOVER: YES
TREMOR: TREMOR NOT VISIBLE BUT CAN BE FELT, FINGERTIP TO FINGERTIP
AUDITORY DISTURBANCES: NOT PRESENT
AGITATION: SOMEWHAT MORE THAN NORMAL ACTIVITY
AUDITORY DISTURBANCES: NOT PRESENT
TOTAL SCORE: 4
TREMOR: *
ANXIETY: *
ALCOHOL_USE: YES
TOTAL SCORE: 4
TOTAL SCORE: 9
CONSUMPTION TOTAL: POSITIVE
DOES PATIENT WANT TO TALK TO SOMEONE ABOUT QUITTING: YES
NAUSEA AND VOMITING: NO NAUSEA AND NO VOMITING
AGITATION: NORMAL ACTIVITY
ANXIETY: NO ANXIETY (AT EASE)
NAUSEA AND VOMITING: *
TOTAL SCORE: 4
PAROXYSMAL SWEATS: *
PAROXYSMAL SWEATS: BARELY PERCEPTIBLE SWEATING. PALMS MOIST
EVER FELT BAD OR GUILTY ABOUT YOUR DRINKING: YES
DOES PATIENT WANT TO STOP DRINKING: YES
TOTAL SCORE: 15
HAVE PEOPLE ANNOYED YOU BY CRITICIZING YOUR DRINKING: YES
HOW MANY TIMES IN THE PAST YEAR HAVE YOU HAD 5 OR MORE DRINKS IN A DAY: 360
ON A TYPICAL DAY WHEN YOU DRINK ALCOHOL HOW MANY DRINKS DO YOU HAVE: 5
VISUAL DISTURBANCES: MILD SENSITIVITY
SUBSTANCE_ABUSE: 1
VISUAL DISTURBANCES: NOT PRESENT
ORIENTATION AND CLOUDING OF SENSORIUM: ORIENTED AND CAN DO SERIAL ADDITIONS
HEADACHE, FULLNESS IN HEAD: SEVERE
HAVE YOU EVER FELT YOU SHOULD CUT DOWN ON YOUR DRINKING: YES

## 2025-04-14 ASSESSMENT — ENCOUNTER SYMPTOMS
DIZZINESS: 0
DIARRHEA: 0
TINGLING: 0
FALLS: 0
VOMITING: 0
LOSS OF CONSCIOUSNESS: 0
WEAKNESS: 0
FEVER: 0
SPUTUM PRODUCTION: 0
HALLUCINATIONS: 0
PALPITATIONS: 0
STRIDOR: 0
NAUSEA: 0
ABDOMINAL PAIN: 0
CHILLS: 0
DEPRESSION: 0
COUGH: 0
SHORTNESS OF BREATH: 0
HEADACHES: 0
CONSTIPATION: 0
MYALGIAS: 0
TREMORS: 1

## 2025-04-14 ASSESSMENT — COGNITIVE AND FUNCTIONAL STATUS - GENERAL
CLIMB 3 TO 5 STEPS WITH RAILING: A LITTLE
SUGGESTED CMS G CODE MODIFIER DAILY ACTIVITY: CH
SUGGESTED CMS G CODE MODIFIER MOBILITY: CJ
DAILY ACTIVITIY SCORE: 24
MOBILITY SCORE: 22
WALKING IN HOSPITAL ROOM: A LITTLE

## 2025-04-14 ASSESSMENT — SOCIAL DETERMINANTS OF HEALTH (SDOH)
WITHIN THE PAST 12 MONTHS, THE FOOD YOU BOUGHT JUST DIDN'T LAST AND YOU DIDN'T HAVE MONEY TO GET MORE: SOMETIMES TRUE
WITHIN THE LAST YEAR, HAVE YOU BEEN KICKED, HIT, SLAPPED, OR OTHERWISE PHYSICALLY HURT BY YOUR PARTNER OR EX-PARTNER?: NO
WITHIN THE LAST YEAR, HAVE YOU BEEN HUMILIATED OR EMOTIONALLY ABUSED IN OTHER WAYS BY YOUR PARTNER OR EX-PARTNER?: NO
IN THE PAST 12 MONTHS, HAS THE ELECTRIC, GAS, OIL, OR WATER COMPANY THREATENED TO SHUT OFF SERVICE IN YOUR HOME?: NO
WITHIN THE LAST YEAR, HAVE YOU BEEN AFRAID OF YOUR PARTNER OR EX-PARTNER?: NO
WITHIN THE LAST YEAR, HAVE TO BEEN RAPED OR FORCED TO HAVE ANY KIND OF SEXUAL ACTIVITY BY YOUR PARTNER OR EX-PARTNER?: NO
WITHIN THE PAST 12 MONTHS, YOU WORRIED THAT YOUR FOOD WOULD RUN OUT BEFORE YOU GOT THE MONEY TO BUY MORE: SOMETIMES TRUE

## 2025-04-14 ASSESSMENT — FIBROSIS 4 INDEX: FIB4 SCORE: 11.26

## 2025-04-14 ASSESSMENT — PAIN DESCRIPTION - PAIN TYPE
TYPE: ACUTE PAIN
TYPE: ACUTE PAIN

## 2025-04-14 NOTE — ED NOTES
Med rec  updated and complete. Allergies reviewed       Confirmed name and date and of birth.    Pt denies antibiotic use in last 30 days.    No anticoagulant or antiplatelet medications.    Denies antibiotic use in last 30 days.      No additional information in Dispense Report .      [facility]  [facility] [phone]    Previously filled at [facility] [phone]

## 2025-04-14 NOTE — ED PROVIDER NOTES
"ED Provider Note    Scribed for Esteban Genao by Michael Concepcion. 4/14/2025  3:06 PM    Primary care provider: Pcp Pt States None  Means of arrival: EMS  History obtained from: Patient  History limited by: None    CHIEF COMPLAINT  Chief Complaint   Patient presents with    Detox     BIB ems from home. Pt states she would like to detox so she can go to Rehab. She states she drinks about 1pint of vodka per day and that her last drink was yesterday. Pt very appropriate during triage.      EXTERNAL RECORDS REVIEWED  Inpatient Notes   Patient was admitted on 3/20/25 for alcohol detox.    HPI/ROS    HPI  Franchesca Ortez is a 39 y.o. female who presents to the Emergency Department for alcohol detox. Patient says she drinks 1 pint of vodka daily since her most recent hospital discharge. Patient states she has not drank any alcohol today. Patient smokes cigarettes and marijuana, denies other drug use. Patient has a history of seizure disorder. She is adherent with her prescribed medications; she denies recent seizures. Patient does not have a history of seizures during alcohol withdrawal. Patient expresses interest in alcohol cessation resources. Patient has been seen at [facility] for detox twice, although she request referral to a new facility because \"I don't like it there.\"     REVIEW OF SYSTEMS  As above, all other systems reviewed and are negative.   See HPI for further details.     PAST MEDICAL HISTORY   has a past medical history of Alcohol abuse, Depression, History of suicide attempt, and Seizure (HCC).  SURGICAL HISTORY   has a past surgical history that includes gyn surgery and primary c section.  SOCIAL HISTORY  Social History     Tobacco Use    Smoking status: Every Day     Current packs/day: 0.50     Average packs/day: 0.5 packs/day for 15.0 years (7.5 ttl pk-yrs)     Types: Cigarettes    Smokeless tobacco: Never    Tobacco comments:     3/4 pk a day   Vaping Use    Vaping status: Never Used   Substance Use " Topics    Alcohol use: Yes     Comment: Hx of 1 pint a day vodka. 1-2 drinks daily    Drug use: Yes     Types: Inhaled     Comment: marijuana      Social History     Substance and Sexual Activity   Drug Use Yes    Types: Inhaled    Comment: marijuana     FAMILY HISTORY  Family History   Problem Relation Age of Onset    Hypertension Father     Diabetes Father     Cancer Maternal Grandmother         liver     CURRENT MEDICATIONS  Home Medications       Reviewed by Glenny Forde (Pharmacy Tech) on 04/14/25 at 1529  Med List Status: Complete     Medication Last Dose Status   levETIRAcetam (KEPPRA) 500 MG Tab 4/14/2025 Active                  ALLERGIES  No Known Allergies    PHYSICAL EXAM    VITAL SIGNS:   Vitals:    04/14/25 1453 04/14/25 1508 04/14/25 1608 04/14/25 1638   BP: 110/84 122/76 123/79    Pulse: 94 (!) 115 94 85   Resp:    20   Temp:       TempSrc:       SpO2: 94% 95% 93% 92%   Weight:       Height:         Vitals: My interpretation: normotensive, tachycardic, afebrile, not hypoxic    Reinterpretation of vitals: Unchanged    Cardiac Monitor Interpretation: The cardiac monitor revealed normal Sinus Rhythm tachycardia as interpreted by me. The cardiac monitor was ordered secondary to the patient's history of tachycardia and to monitor for dysrhythmia and/or tachycardia.    PE:   Gen: Appears anxious, tremulous, tachycardic  ENT: Mucous membranes moist, posterior pharynx clear, uvula midline, nares patent bilaterally   Neck: Supple, FROM  Pulmonary: Lungs are clear to auscultation bilaterally. No tachypnea  CV: Tachycardia, no murmur appreciated, pulses 2+ in both upper and lower extremities  Abdomen: soft, NT/ND; no rebound/guarding  : no CVA or suprapubic tenderness   Neuro: A&Ox4 (person, place, time, situation), speech fluent, gait steady, no focal deficits appreciated  Skin: No rash or lesions.  No pallor or jaundice.  No cyanosis.  Warm and dry.     DIAGNOSTIC STUDIES / PROCEDURES    LABS  Results  for orders placed or performed during the hospital encounter of 04/14/25   URINE DRUG SCREEN    Collection Time: 04/14/25  3:52 PM   Result Value Ref Range    Amphetamines Urine Negative Negative    Barbiturates Negative Negative    Benzodiazepines Positive (A) Negative    Cocaine Metabolite Positive (A) Negative    Fentanyl, Urine Negative Negative    Methadone Negative Negative    Opiates Negative Negative    Oxycodone Negative Negative    Phencyclidine -Pcp Negative Negative    Propoxyphene Negative Negative    Cannabinoid Metab Positive (A) Negative   COMP METABOLIC PANEL    Collection Time: 04/14/25  4:27 PM   Result Value Ref Range    Sodium 136 135 - 145 mmol/L    Potassium 4.0 3.6 - 5.5 mmol/L    Chloride 101 96 - 112 mmol/L    Co2 18 (L) 20 - 33 mmol/L    Anion Gap 17.0 (H) 7.0 - 16.0    Glucose 119 (H) 65 - 99 mg/dL    Bun 12 8 - 22 mg/dL    Creatinine 0.51 0.50 - 1.40 mg/dL    Calcium 9.1 8.5 - 10.5 mg/dL    Correct Calcium 9.3 8.5 - 10.5 mg/dL    AST(SGOT) 159 (H) 12 - 45 U/L    ALT(SGPT) 63 (H) 2 - 50 U/L    Alkaline Phosphatase 104 (H) 30 - 99 U/L    Total Bilirubin 1.3 0.1 - 1.5 mg/dL    Albumin 3.8 3.2 - 4.9 g/dL    Total Protein 7.4 6.0 - 8.2 g/dL    Globulin 3.6 (H) 1.9 - 3.5 g/dL    A-G Ratio 1.1 g/dL   HCG QUAL SERUM    Collection Time: 04/14/25  4:27 PM   Result Value Ref Range    Beta-Hcg Qualitative Serum Negative Negative   MAGNESIUM    Collection Time: 04/14/25  4:27 PM   Result Value Ref Range    Magnesium 1.1 (L) 1.5 - 2.5 mg/dL   PHOSPHORUS    Collection Time: 04/14/25  4:27 PM   Result Value Ref Range    Phosphorus 1.7 (L) 2.5 - 4.5 mg/dL   ESTIMATED GFR    Collection Time: 04/14/25  4:27 PM   Result Value Ref Range    GFR (CKD-EPI) 122 >60 mL/min/1.73 m 2   CBC WITH DIFFERENTIAL    Collection Time: 04/14/25  5:53 PM   Result Value Ref Range    WBC 11.6 (H) 4.8 - 10.8 K/uL    RBC 3.70 (L) 4.20 - 5.40 M/uL    Hemoglobin 13.1 12.0 - 16.0 g/dL    Hematocrit 38.7 37.0 - 47.0 %    .6  (H) 81.4 - 97.8 fL    MCH 35.4 (H) 27.0 - 33.0 pg    MCHC 33.9 32.2 - 35.5 g/dL    RDW 51.9 (H) 35.9 - 50.0 fL    Platelet Count 105 (L) 164 - 446 K/uL    MPV 9.0 9.0 - 12.9 fL    Neutrophils-Polys 76.10 (H) 44.00 - 72.00 %    Lymphocytes 16.00 (L) 22.00 - 41.00 %    Monocytes 6.30 0.00 - 13.40 %    Eosinophils 0.20 0.00 - 6.90 %    Basophils 0.70 0.00 - 1.80 %    Immature Granulocytes 0.70 0.00 - 0.90 %    Nucleated RBC 0.00 0.00 - 0.20 /100 WBC    Neutrophils (Absolute) 8.83 (H) 1.82 - 7.42 K/uL    Lymphs (Absolute) 1.85 1.00 - 4.80 K/uL    Monos (Absolute) 0.73 0.00 - 0.85 K/uL    Eos (Absolute) 0.02 0.00 - 0.51 K/uL    Baso (Absolute) 0.08 0.00 - 0.12 K/uL    Immature Granulocytes (abs) 0.08 0.00 - 0.11 K/uL    NRBC (Absolute) 0.00 K/uL      All labs reviewed by me. Labs were compared to prior labs if they were available. Significant for urine drug screen positive for cocaine and cannabinoid, normal electrolytes with a bicarb of 18, patient given a liter of fluids for this, she is a acute on chronic transaminitis with AST predominance, normal bilirubin, magnesium and phosphorus both below, no significant leukocytosis or anemia.    COURSE & MEDICAL DECISION MAKING  Nursing notes, VS, PMSFHx, labs, imaging, EKG reviewed in chart.    ED Observation Status? No; Patient does not meet criteria for ED Observation.     Ddx: Alcohol drawl, delirium tremors from alcohol abuse    MDM: 3:06 PM Franchesca Ortez is a 39 y.o. female who presented with evaluation for alcohol abuse and withdrawal.  Patient has a heavy alcohol use history.  Was admitted here last month and chart review for alcohol drawl.  Does have a history of seizures as well, on Keppra, unclear if she has had seizures from alcohol drawl or from underlying epilepsy from prior brain injury.  No seizure-like activity currently.  Patient is asking for help with detox as she is feeling extremely anxious, tremulous.  Abuses tobacco but no drug use.  Upon arrival  "here patient does have vital signs showing tachycardia consistent with likely dehydration and alcohol withdrawal.  Her exam is fairly benign but she appears anxious and tremulous.  CIWA elevated patient started on IV Versed administered by nursing staff.  IV fluids administered for probably concomitant dehydration.  Basic labs ordered.  Patient be admitted for alcohol drawl and need for IV benzodiazepines.  Patient verbalized understanding strict plan for admission and is amenable.  All labs reviewed by me. Labs were compared to prior labs if they were available. Significant for urine drug screen positive for cocaine and cannabinoid, normal electrolytes with a bicarb of 18, patient given a liter of fluids for this, she is a acute on chronic transaminitis with AST predominance, normal bilirubin, magnesium and phosphorus both below, no significant leukocytosis or anemia.    Procedure Note:  Tobacco Cessation Counselling, Intermediate Timeframe, Greater than 4 minutes, less than 10 minutes:  Patient has been smoking for 25 years, averaging 1 packs per day.  Has not tried to stop in the past.  The patient knows that smoking is bad for general health and for the patient's disease process in particular.  I have recommended the use of a \"quit gary\" to facilitate success.  I have also talked about resources with the [employer] Cancer Society, the American Heart Association, as well as 1-800-QUIT-NOW.  Finally, I briefly mentioned that pharmacologic adjuncts might be prescribed by a primary care physician, failing over-the-counter modalities.  The patient is given aftercare instructions on tobacco cessation.    ADDITIONAL PROBLEM LIST AND DISPOSITION    I have discussed management of the patient with the following physicians and LISET's: Hospitalist    Discussion of management with other [facility] or appropriate source(s): None     Escalation of care considered, and ultimately not performed:diagnostic imaging    Barriers to care at " this time, including but not limited to: Patient does not have established PCP.     Decision tools and prescription drugs considered including, but not limited to:  Versed .    FINAL IMPRESSION  1. Alcohol abuse Acute   2. Alcohol withdrawal syndrome without complication (HCC) Acute   3. Tachycardia Acute   4. Dehydration Acute   5. Tobacco abuse Acute   6. Tobacco abuse counseling Acute      Michael AVERY (Yumiko), am scribing for, and in the presence of, Esteban Genao.    Electronically signed by: Michael Concepcion (Yumiko), 4/14/2025    IEsteban personally performed the services described in this documentation, as scribed by Michael Concepcion in my presence, and it is both accurate and complete.    The note accurately reflects work and decisions made by me.  Esteban Genao  4/14/2025  3:34 PM

## 2025-04-14 NOTE — ASSESSMENT & PLAN NOTE
Patient does drink a significant amount of alcohol  Last drink was last night  States she does not want to drink  Currently tachycardic and tremulous  Start CIWA protocol  Start scheduled Librium  Does have a seizure history, denies seizures associated with alcohol withdrawal  Patient is at risk of worsening and does require close monitoring  If she does worsen, will consider upgrade to IMCU for Precedex drip however that is not deemed necessary at this time    Addendum: Patient had a seizure, I am unsure about her compliance with Keppra, I am going to change Keppra to IV, she denies seizures associated with alcohol withdrawal but I certainly think her alcohol withdrawal caused this one, I am going to DC Librium and add scheduled Valium, continue as needed Ativan, will reevaluate after a dose of Valium and determine if she is okay for the floor versus going to IMCU  She did aspirate, I am going to start aspiration and seizure precautions and start IV Unasyn  Monitor oxygen status

## 2025-04-14 NOTE — ED NOTES
Attempted to draw lads off ems IV, Iv did not draw back, straight stuck patient for labs, was unsuccessful. Phlebotomy paged

## 2025-04-14 NOTE — H&P
Hospital Medicine History & Physical Note    Date of Service  4/14/2025    Primary Care Physician  Pcp Pt States None    Consultants  None    Specialist Names: None    Code Status  Full Code    Chief Complaint  Chief Complaint   Patient presents with    Detox     BIB ems from home. Pt states she would like to detox so she can go to Rehab. She states she drinks about 1pint of vodka per day and that her last drink was yesterday. Pt very appropriate during triage.        History of Presenting Illness  Franchesca Ortez is a 39 y.o. female who presented 4/14/2025 with alcohol withdrawal.  Patient states she generally drinks a pint of vodka a day, stopped drinking last night in the hopes of quitting.  This morning she woke up with tremors.  She denies any hallucinations.  She does have a history of seizure disorder but states she has not had seizures with alcohol withdrawal in the past.  Upon arrival, patient was noted to be in alcohol withdrawal.  I did discuss the case including labs and imaging with the ER physician.    I discussed the plan of care with patientpatient.    Review of Systems  Review of Systems   Constitutional:  Negative for chills, fever and malaise/fatigue.   HENT:  Negative for congestion.    Respiratory:  Negative for cough, sputum production, shortness of breath and stridor.    Cardiovascular:  Negative for chest pain, palpitations and leg swelling.   Gastrointestinal:  Negative for abdominal pain, constipation, diarrhea, nausea and vomiting.   Genitourinary:  Negative for dysuria and urgency.   Musculoskeletal:  Negative for falls and myalgias.   Neurological:  Positive for tremors. Negative for dizziness, tingling, loss of consciousness, weakness and headaches.   Psychiatric/Behavioral:  Positive for substance abuse. Negative for depression, hallucinations and suicidal ideas.    All other systems reviewed and are negative.      Past Medical History   has a past medical history of Alcohol abuse,  Depression, History of suicide attempt, and Seizure (HCC).    Surgical History   has a past surgical history that includes gyn surgery and primary c section.     Family History  family history includes Cancer in her maternal grandmother; Diabetes in her father; Hypertension in her father.   Family history reviewed with patient. There is no family history that is pertinent to the chief complaint.     Social History   reports that she has been smoking cigarettes. She has a 7.5 pack-year smoking history. She has never used smokeless tobacco. She reports current alcohol use. She reports current drug use. Drug: Inhaled.    Allergies  No Known Allergies    Medications  Prior to Admission Medications   Prescriptions Last Dose Informant Patient Reported? Taking?   levETIRAcetam (KEPPRA) 500 MG Tab 4/14/2025 Morning Patient No Yes   Sig: Take 2 tablets (1000 mg) orally in the morning and 2 tablets (1000 mg)  in the evening about 12 hours apart.   Patient taking differently: Take 1,000 mg by mouth 2 times a day. Take 2 tablets (1000 mg) orally in the morning and 2 tablets (1000 mg)  in the evening about 12 hours apart.      Facility-Administered Medications: None       Physical Exam  Temp:  [37.1 °C (98.7 °F)] 37.1 °C (98.7 °F)  Pulse:  [] 115  Resp:  [18] 18  BP: (110-122)/(76-84) 122/76  SpO2:  [94 %-96 %] 95 %  Blood Pressure: 122/76   Temperature: 37.1 °C (98.7 °F)   Pulse: (!) 115   Respiration: 18   Pulse Oximetry: 95 %       Physical Exam  Vitals and nursing note reviewed.   Constitutional:       General: She is not in acute distress.     Appearance: She is well-developed. She is ill-appearing. She is not diaphoretic.   HENT:      Head: Normocephalic and atraumatic.      Right Ear: External ear normal.      Left Ear: External ear normal.      Nose: Nose normal. No congestion or rhinorrhea.      Mouth/Throat:      Mouth: Mucous membranes are moist.      Pharynx: No oropharyngeal exudate.   Eyes:      General:     "     Right eye: No discharge.         Left eye: No discharge.   Neck:      Trachea: No tracheal deviation.   Cardiovascular:      Rate and Rhythm: Regular rhythm. Tachycardia present.      Heart sounds: No murmur heard.     No friction rub. No gallop.   Pulmonary:      Effort: Pulmonary effort is normal. No respiratory distress.      Breath sounds: Normal breath sounds. No stridor. No wheezing or rales.   Chest:      Chest wall: No tenderness.   Abdominal:      General: Bowel sounds are normal. There is no distension.      Palpations: Abdomen is soft.      Tenderness: There is no abdominal tenderness.   Musculoskeletal:         General: No tenderness. Normal range of motion.      Cervical back: Neck supple.      Right lower leg: No edema.      Left lower leg: No edema.   Lymphadenopathy:      Cervical: No cervical adenopathy.   Skin:     General: Skin is warm and dry.      Findings: No erythema or rash.   Neurological:      Mental Status: She is alert and oriented to person, place, and time.      Cranial Nerves: No cranial nerve deficit.      Motor: Tremor present.   Psychiatric:         Mood and Affect: Mood normal.         Behavior: Behavior normal.         Thought Content: Thought content normal.         Judgment: Judgment normal.         Laboratory:          No results for input(s): \"ALTSGPT\", \"ASTSGOT\", \"ALKPHOSPHAT\", \"TBILIRUBIN\", \"DBILIRUBIN\", \"GAMMAGT\", \"AMYLASE\", \"LIPASE\", \"ALB\", \"PREALBUMIN\", \"GLUCOSE\" in the last 72 hours.      No results for input(s): \"NTPROBNP\" in the last 72 hours.      No results for input(s): \"TROPONINT\" in the last 72 hours.    Imaging:  No orders to display       no X-Ray or EKG requiring interpretation    Assessment/Plan:  Justification for Admission Status  I anticipate this patient will require at least two midnights for appropriate medical management, necessitating inpatient admission because alcohol withdrawal    Patient will need a Med/Surg bed on MEDICAL service .  The need " is secondary to alcohol withdrawal.    * Alcohol abuse with withdrawal (HCC)- (present on admission)  Assessment & Plan  Patient does drink a significant amount of alcohol  Last drink was last night  States she does not want to drink  Currently tachycardic and tremulous  Start CIWA protocol  Start scheduled Librium  Does have a seizure history, denies seizures associated with alcohol withdrawal  Patient is at risk of worsening and does require close monitoring  If she does worsen, will consider upgrade to IMCU for Precedex drip however that is not deemed necessary at this time    Addendum: Patient had a seizure, I am unsure about her compliance with Keppra, I am going to change Keppra to IV, she denies seizures associated with alcohol withdrawal but I certainly think her alcohol withdrawal caused this one, I am going to DC Librium and add scheduled Valium, continue as needed Ativan, will reevaluate after a dose of Valium and determine if she is okay for the floor versus going to IMCU  She did aspirate, I am going to start aspiration and seizure precautions and start IV Unasyn  Monitor oxygen status    Pancytopenia (HCC)- (present on admission)  Assessment & Plan  Chronic, due to alcohol abuse   no sign of gross bleeding or infection  CBC currently pending    Seizure disorder (HCC)- (present on admission)  Assessment & Plan  Continue home Keppra  Patient will also be on as needed benzos and scheduled Librium        VTE prophylaxis: SCDs/TEDs

## 2025-04-14 NOTE — ED TRIAGE NOTES
Chief Complaint   Patient presents with    Detox     BIB ems from home. Pt states she would like to detox so she can go to Rehab. She states she drinks about 1pint of vodka per day and that her last drink was yesterday. Pt very appropriate during triage.      Vitals:    04/14/25 1446   BP: 110/84   Pulse: (!) 104   Resp: 18   Temp: 37.1 °C (98.7 °F)   SpO2: 96%

## 2025-04-15 ENCOUNTER — HOSPITAL ENCOUNTER (OUTPATIENT)
Dept: RADIOLOGY | Facility: MEDICAL CENTER | Age: 40
End: 2025-04-15
Attending: INTERNAL MEDICINE
Payer: COMMERCIAL

## 2025-04-15 VITALS
WEIGHT: 125 LBS | BODY MASS INDEX: 20.83 KG/M2 | DIASTOLIC BLOOD PRESSURE: 84 MMHG | OXYGEN SATURATION: 94 % | TEMPERATURE: 97.8 F | HEIGHT: 65 IN | RESPIRATION RATE: 20 BRPM | SYSTOLIC BLOOD PRESSURE: 121 MMHG | HEART RATE: 96 BPM

## 2025-04-15 LAB
ALBUMIN SERPL BCP-MCNC: 3.7 G/DL (ref 3.2–4.9)
ALBUMIN/GLOB SERPL: 1.3 G/DL
ALP SERPL-CCNC: 93 U/L (ref 30–99)
ALT SERPL-CCNC: 54 U/L (ref 2–50)
ANION GAP SERPL CALC-SCNC: 11 MMOL/L (ref 7–16)
AST SERPL-CCNC: 141 U/L (ref 12–45)
BILIRUB SERPL-MCNC: 1.1 MG/DL (ref 0.1–1.5)
BUN SERPL-MCNC: 12 MG/DL (ref 8–22)
CALCIUM ALBUM COR SERPL-MCNC: 9 MG/DL (ref 8.5–10.5)
CALCIUM SERPL-MCNC: 8.8 MG/DL (ref 8.5–10.5)
CHLORIDE SERPL-SCNC: 101 MMOL/L (ref 96–112)
CO2 SERPL-SCNC: 22 MMOL/L (ref 20–33)
CREAT SERPL-MCNC: 0.55 MG/DL (ref 0.5–1.4)
ERYTHROCYTE [DISTWIDTH] IN BLOOD BY AUTOMATED COUNT: 50.4 FL (ref 35.9–50)
GFR SERPLBLD CREATININE-BSD FMLA CKD-EPI: 120 ML/MIN/1.73 M 2
GLOBULIN SER CALC-MCNC: 2.9 G/DL (ref 1.9–3.5)
GLUCOSE SERPL-MCNC: 99 MG/DL (ref 65–99)
HCT VFR BLD AUTO: 32.9 % (ref 37–47)
HGB BLD-MCNC: 11.1 G/DL (ref 12–16)
MAGNESIUM SERPL-MCNC: 1.2 MG/DL (ref 1.5–2.5)
MCH RBC QN AUTO: 34.8 PG (ref 27–33)
MCHC RBC AUTO-ENTMCNC: 33.7 G/DL (ref 32.2–35.5)
MCV RBC AUTO: 103.1 FL (ref 81.4–97.8)
PHOSPHATE SERPL-MCNC: 1.7 MG/DL (ref 2.5–4.5)
PLATELET # BLD AUTO: 94 K/UL (ref 164–446)
PLATELETS.RETICULATED NFR BLD AUTO: 5.2 % (ref 0.6–13.1)
PMV BLD AUTO: 9.6 FL (ref 9–12.9)
POTASSIUM SERPL-SCNC: 3.4 MMOL/L (ref 3.6–5.5)
PROT SERPL-MCNC: 6.6 G/DL (ref 6–8.2)
RBC # BLD AUTO: 3.19 M/UL (ref 4.2–5.4)
SODIUM SERPL-SCNC: 134 MMOL/L (ref 135–145)
WBC # BLD AUTO: 6.9 K/UL (ref 4.8–10.8)

## 2025-04-15 PROCEDURE — 700111 HCHG RX REV CODE 636 W/ 250 OVERRIDE (IP): Mod: JZ | Performed by: INTERNAL MEDICINE

## 2025-04-15 PROCEDURE — 700102 HCHG RX REV CODE 250 W/ 637 OVERRIDE(OP)

## 2025-04-15 PROCEDURE — 700102 HCHG RX REV CODE 250 W/ 637 OVERRIDE(OP): Performed by: INTERNAL MEDICINE

## 2025-04-15 PROCEDURE — A9270 NON-COVERED ITEM OR SERVICE: HCPCS | Performed by: INTERNAL MEDICINE

## 2025-04-15 PROCEDURE — A9270 NON-COVERED ITEM OR SERVICE: HCPCS

## 2025-04-15 PROCEDURE — 700105 HCHG RX REV CODE 258: Performed by: INTERNAL MEDICINE

## 2025-04-15 PROCEDURE — RXMED WILLOW AMBULATORY MEDICATION CHARGE: Performed by: INTERNAL MEDICINE

## 2025-04-15 PROCEDURE — 84100 ASSAY OF PHOSPHORUS: CPT

## 2025-04-15 PROCEDURE — 83735 ASSAY OF MAGNESIUM: CPT

## 2025-04-15 PROCEDURE — 99239 HOSP IP/OBS DSCHRG MGMT >30: CPT | Performed by: INTERNAL MEDICINE

## 2025-04-15 PROCEDURE — 85027 COMPLETE CBC AUTOMATED: CPT

## 2025-04-15 PROCEDURE — 36415 COLL VENOUS BLD VENIPUNCTURE: CPT

## 2025-04-15 PROCEDURE — 85055 RETICULATED PLATELET ASSAY: CPT

## 2025-04-15 PROCEDURE — 80053 COMPREHEN METABOLIC PANEL: CPT

## 2025-04-15 RX ORDER — GAUZE BANDAGE 2" X 2"
100 BANDAGE TOPICAL DAILY
Status: DISCONTINUED | OUTPATIENT
Start: 2025-04-15 | End: 2025-04-15 | Stop reason: HOSPADM

## 2025-04-15 RX ORDER — ACETAMINOPHEN 325 MG/1
650 TABLET ORAL EVERY 6 HOURS PRN
COMMUNITY
Start: 2025-04-15

## 2025-04-15 RX ORDER — FOLIC ACID 1 MG/1
1 TABLET ORAL DAILY
Status: DISCONTINUED | OUTPATIENT
Start: 2025-04-15 | End: 2025-04-15 | Stop reason: HOSPADM

## 2025-04-15 RX ORDER — LANOLIN ALCOHOL/MO/W.PET/CERES
100 CREAM (GRAM) TOPICAL DAILY
COMMUNITY
Start: 2025-04-15

## 2025-04-15 RX ORDER — FOLIC ACID 1 MG/1
1 TABLET ORAL DAILY
COMMUNITY
Start: 2025-04-15

## 2025-04-15 RX ADMIN — Medication 100 MG: at 13:24

## 2025-04-15 RX ADMIN — LORAZEPAM 0.5 MG: 0.5 TABLET ORAL at 09:26

## 2025-04-15 RX ADMIN — NICOTINE TRANSDERMAL SYSTEM 21 MG: 21 PATCH, EXTENDED RELEASE TRANSDERMAL at 05:32

## 2025-04-15 RX ADMIN — AMPICILLIN SODIUM, SULBACTAM SODIUM 3 G: 2; 1 INJECTION, POWDER, FOR SOLUTION INTRAMUSCULAR; INTRAVENOUS at 09:31

## 2025-04-15 RX ADMIN — LEVETIRACETAM 1000 MG: 100 INJECTION, SOLUTION INTRAVENOUS at 05:33

## 2025-04-15 RX ADMIN — AMPICILLIN SODIUM, SULBACTAM SODIUM 3 G: 2; 1 INJECTION, POWDER, FOR SOLUTION INTRAMUSCULAR; INTRAVENOUS at 02:28

## 2025-04-15 RX ADMIN — MULTIVITAMIN TABLET 1 TABLET: TABLET at 13:24

## 2025-04-15 RX ADMIN — FOLIC ACID 1 MG: 1 TABLET ORAL at 13:24

## 2025-04-15 RX ADMIN — NICOTINE POLACRILEX 2 MG: 2 GUM, CHEWING BUCCAL at 01:17

## 2025-04-15 ASSESSMENT — LIFESTYLE VARIABLES
NAUSEA AND VOMITING: NO NAUSEA AND NO VOMITING
VISUAL DISTURBANCES: NOT PRESENT
TOTAL SCORE: 0
ANXIETY: NO ANXIETY (AT EASE)
ANXIETY: *
HEADACHE, FULLNESS IN HEAD: NOT PRESENT
HEADACHE, FULLNESS IN HEAD: NOT PRESENT
AGITATION: NORMAL ACTIVITY
PAROXYSMAL SWEATS: NO SWEAT VISIBLE
TREMOR: TREMOR NOT VISIBLE BUT CAN BE FELT, FINGERTIP TO FINGERTIP
TOTAL SCORE: 0
VISUAL DISTURBANCES: NOT PRESENT
HEADACHE, FULLNESS IN HEAD: NOT PRESENT
NAUSEA AND VOMITING: NO NAUSEA AND NO VOMITING
NAUSEA AND VOMITING: NO NAUSEA AND NO VOMITING
TREMOR: TREMOR NOT VISIBLE BUT CAN BE FELT, FINGERTIP TO FINGERTIP
AUDITORY DISTURBANCES: NOT PRESENT
ANXIETY: NO ANXIETY (AT EASE)
TREMOR: NO TREMOR
TOTAL SCORE: 2
ORIENTATION AND CLOUDING OF SENSORIUM: ORIENTED AND CAN DO SERIAL ADDITIONS
AUDITORY DISTURBANCES: NOT PRESENT
ANXIETY: NO ANXIETY (AT EASE)
TOTAL SCORE: 3
ORIENTATION AND CLOUDING OF SENSORIUM: ORIENTED AND CAN DO SERIAL ADDITIONS
PAROXYSMAL SWEATS: BARELY PERCEPTIBLE SWEATING. PALMS MOIST
AGITATION: SOMEWHAT MORE THAN NORMAL ACTIVITY
AUDITORY DISTURBANCES: NOT PRESENT
AGITATION: NORMAL ACTIVITY
AGITATION: NORMAL ACTIVITY
ANXIETY: NO ANXIETY (AT EASE)
TREMOR: NO TREMOR
TOTAL SCORE: 5
HEADACHE, FULLNESS IN HEAD: NOT PRESENT
NAUSEA AND VOMITING: NO NAUSEA AND NO VOMITING
PAROXYSMAL SWEATS: BARELY PERCEPTIBLE SWEATING. PALMS MOIST
PAROXYSMAL SWEATS: NO SWEAT VISIBLE
VISUAL DISTURBANCES: NOT PRESENT
ORIENTATION AND CLOUDING OF SENSORIUM: ORIENTED AND CAN DO SERIAL ADDITIONS
AGITATION: NORMAL ACTIVITY
AUDITORY DISTURBANCES: NOT PRESENT
ORIENTATION AND CLOUDING OF SENSORIUM: ORIENTED AND CAN DO SERIAL ADDITIONS
TREMOR: TREMOR NOT VISIBLE BUT CAN BE FELT, FINGERTIP TO FINGERTIP
PAROXYSMAL SWEATS: *
HEADACHE, FULLNESS IN HEAD: NOT PRESENT
NAUSEA AND VOMITING: NO NAUSEA AND NO VOMITING
AUDITORY DISTURBANCES: NOT PRESENT
VISUAL DISTURBANCES: NOT PRESENT
VISUAL DISTURBANCES: NOT PRESENT
ORIENTATION AND CLOUDING OF SENSORIUM: ORIENTED AND CAN DO SERIAL ADDITIONS

## 2025-04-15 NOTE — PROGRESS NOTES
4 Eyes Skin Assessment Completed by Milton, RN and Lesley RN.    Head Redness  Ears Redness and Blanching  Nose WDL  Mouth WDL  Neck Redness and Blanching  Breast/Chest WDL  Shoulder Blades WDL  Spine WDL  (R) Arm/Elbow/Hand WDL  (L) Arm/Elbow/Hand WDL  Abdomen WDL  Groin WDL  Scrotum/Coccyx/Buttocks WDL  (R) Leg WDL  (L) Leg Bruising  (R) Heel/Foot/Toe calloused  (L) Heel/Foot/Toe calloused                        Devices In Places       Interventions In Place Pressure Redistribution Mattress    Possible Skin Injury No    Pictures Uploaded Into Epic Yes  Wound Consult Placed N/A  RN Wound Prevention Protocol Ordered No

## 2025-04-15 NOTE — CARE PLAN
The patient is Stable - Low risk of patient condition declining or worsening    Shift Goals  Clinical Goals: pt's CIWA score will remian below 10 during the shift, there wont be any sign of seizure activuty throughout the shift  Patient Goals: rest  Family Goals: ELBA    Progress made toward(s) clinical / shift goals:  pt's ciwa score remain below 10 during the shift. Pt refusing scheduled Valium and ativan, education provided. No seizure activity noted. Seizure precautions/ aspiration precautions remain in place. No tremors noted.     Patient is not progressing towards the following goals:

## 2025-04-15 NOTE — DISCHARGE PLANNING
Case Management Discharge Planning    Admission Date: 4/14/2025  GMLOS: 3.3  ALOS: 1    6-Clicks ADL Score: 24  6-Clicks Mobility Score: 22      Anticipated Discharge Dispo: Discharge Disposition: Discharged to home/self care (01) with close OP follow up     DME Needed: No    Action(s) Taken: Updated Provider/Nurse on Discharge Plan    Pt was discussed in IDT rounds with Dr Valadez . Plan to discharge Pt to home with close OP follow up , pending MRI.    This RN CM added resources in AVS for Pt social drivers of health.     Escalations Completed: None    Medically Clear: No    Next Steps:   CM to continue to assist Pt with discharge as needed    Barriers to Discharge:   Medical clearance    Is the patient up for discharge tomorrow: No

## 2025-04-15 NOTE — ED NOTES
Assist RN: pt noted to be actively seizing by myself, admitting MD called to bedside and gave verbal order for 5mg valium. Medication pulled and given per order.    Pt was suctioned and rolled into recovery position. Estimated 1 minute of witnessed seizure activity. Pt seizure like activity stopped and pt sat up, blood suctioned and admitting hears crackles in lungs. Orders to be placed.    Primary RN aware. Pt d/c'd from O2 due to maintained sats and non-compliance with mask

## 2025-04-15 NOTE — DISCHARGE SUMMARY
Discharge Summary    CHIEF COMPLAINT ON ADMISSION  Chief Complaint   Patient presents with    Detox     BIB ems from home. Pt states she would like to detox so she can go to Rehab. She states she drinks about 1pint of vodka per day and that her last drink was yesterday. Pt very appropriate during triage.        Reason for Admission  EMS     Admission Date  4/14/2025    CODE STATUS  Full Code    HPI & HOSPITAL COURSE  This is a 39 y.o. female here with Detox (BIB ems from home. Pt states she would like to detox so she can go to Rehab. She states she drinks about 1pint of vodka per day and that her last drink was yesterday. Pt very appropriate during triage. )  Please review DIMITRI Faith.O. notes for further details of history of present illness, past medical/social/family histories, allergies and medications.   Managied for alcohol withdrawal and one episode of seizure like activity. She has a history of seizures followed by [facility] Mccarthy and she has no drivers license, she also has a cavernoma which she was to see Neurosurgery as a potential cause of her seizures. She snokes cigarettes and drinks alcohol. She was being detoxed here and I ordered thiamine and vitamins along with the PRN benzos. She however felt she could leave, did not want to continue withdrawal period and DECLINED MRI thus left AGAINST MEDICAL ADVICE. .I spent 4 minutes, discussing tobacco dependence and cardiac as well as pulmonary risk. Smoking cessation instructions. Code 74620 and we provided resources for alcohol cessation.      Imaging  No orders to display               Discharge Date  4/15/2025    FOLLOW UP ITEMS POST DISCHARGE      DISCHARGE DIAGNOSES  Principal Problem:    Alcohol abuse with withdrawal (HCC) (POA: Yes)  Active Problems:    Seizure disorder (HCC) (POA: Yes)    Pancytopenia (HCC) (POA: Yes)  Resolved Problems:    * No resolved hospital problems. *      FOLLOW UP  Future Appointments   Date Time Provider Department  Center   4/17/2025  2:00 PM Danis Wallace M.D. [facility] None     No follow-up provider specified.  DECLINED MRI brain. She chooses to LEAVE AGAINST MEDICAL ADVICE. At risk for seizures and mobidity/mortality from that. She has no license, no need for DMV reporting. FOLLOW UPS Assign and follow up with primary provider or discharge clinic physician in 1 week Follow up with Dr. Raya, Neurology in 1 week for seizures Follow up with Neurosurgery for stable appearance of of the previously described small left frontal cortical-subcortical cavernoma, no edema on an October MRI  in 1 week. NURSING provide resources/pamphlets for NO MORE SMOKING, avoid alcohol, seizures (no swimming, driving or operating heavy machinery)   MEDICATIONS ON DISCHARGE     Medication List        START taking these medications        Instructions   acetaminophen 325 MG Tabs  Commonly known as: Tylenol   Take 2 Tablets by mouth every 6 hours as needed for Mild Pain, Moderate Pain or Fever.  Dose: 650 mg     amoxicillin-clavulanate 875-125 MG Tabs  Commonly known as: Augmentin   Take 1 Tablet by mouth 2 times a day for 4 days.  Dose: 1 Tablet     folic acid 1 MG Tabs  Commonly known as: Folvite   Take 1 Tablet by mouth every day.  Dose: 1 mg     multivitamin Tabs   Take 1 Tablet by mouth every day.  Dose: 1 Tablet     thiamine 100 MG tablet  Commonly known as: Thiamine   Take 1 Tablet by mouth every day.  Dose: 100 mg            CHANGE how you take these medications        Instructions   levETIRAcetam 500 MG Tabs  What changed:   how much to take  how to take this  when to take this  Commonly known as: Keppra   Take 2 tablets (1000 mg) orally in the morning and 2 tablets (1000 mg)  in the evening about 12 hours apart.              Allergies  No Known Allergies    DIET  Orders Placed This Encounter   Procedures    Diet Order Diet: Regular     Standing Status:   Standing     Number of Occurrences:   1     Diet::   Regular [1]        ACTIVITY      CONSULTATIONS      PROCEDURES      LABORATORY  Lab Results   Component Value Date    SODIUM 134 (L) 04/15/2025    POTASSIUM 3.4 (L) 04/15/2025    CHLORIDE 101 04/15/2025    CO2 22 04/15/2025    GLUCOSE 99 04/15/2025    BUN 12 04/15/2025    CREATININE 0.55 04/15/2025        Lab Results   Component Value Date    WBC 6.9 04/15/2025    HEMOGLOBIN 11.1 (L) 04/15/2025    HEMATOCRIT 32.9 (L) 04/15/2025    PLATELETCT 94 (L) 04/15/2025        Total time of the discharge process exceeds 40 minutes.

## 2025-04-15 NOTE — DIETARY
"Nutrition Services: Initial Assessment     Day 1 of admit. Franchesca Ortez is 39 y.o., female with admitting DX of Alcohol abuse with withdrawal (HCC) [F10.139].    Consult Received for: MST - Malnutrition Screening Tool  MST 5: Weight loss (>34lb x >1 year) with decreased appetite    Pt admitted with alcohol withdrawal per EMR/provider note. Pt reports 1-3 meals per day (small portions) at home. Pt reports oral supplements 1-2 times per month, limited due to financial strain. Pt reports no N/V, chronic diarrhea, chronic constipation- no medication assistance. Pt report no difficulty chewing and/or swallowing. Pt reports NKFA. Pt reports weight loss since starting seizure medication over two years ago. Pt reports 170lb ~two years, pt reports weight loss when having seizure due to biting tongue on FLD. Pt reports lost majority of weight (~45lb) after first seizure within about six weeks. Pt reports continuing weight loss within the past six weeks as well. Pt reports no nutrition related questions and/or concerns.     Nutrition Assessment:      Height: 165.1 cm (5' 5\")  Weight: 56.7 kg (125 lb)  Weight taken via: Other Healthcare Provider  BMI Calculated: 20.8  BMI Classification: WNL       Weight Readings from Last 5 encounters:   Wt Readings from Last 5 Encounters:   04/14/25 56.7 kg (125 lb)   03/21/25 56.5 kg (124 lb 9 oz)   01/31/25 57.1 kg (125 lb 14.1 oz)   12/27/24 63.5 kg (140 lb)   12/17/24 63.5 kg (140 lb)         Objective:   Pertinent Labs: Na 134, K 3.4, Phosphorus 1.7, Magnesium 1.2  Pertinent Meds: Unasyn  Skin/Wounds:  no concerns noted  Food Allergies: NKFA  Last BM:     04/13/25 (per pt)       Current Diet Order/Intake:   Regular diet    Nutrition Focused Physical Exam (NFPE)  NFPE did not occur  Nutrition Diagnosis:      Inadequate energy intake related to excessive alcohol intake, decreased appetite as evidence by reported weight loss of >34lb    Nutrition Interventions:      Recommend to " resume regular diet  Recommend multivitamin. Recommend thiamine and folic acid x 7 days.   Recommend to monitor electrolytes including potassium, phosphorus, and magnesium  Will order Ensure Plus (per pt's request) once electrolytes WNL  Patient aware of active plan of care as appropriate.       Nutrition Monitoring and Evaluation:      Monitor nutrition POC, goal for >75% intake from meals and supplements.  Additional fluids per MD/DO  Monitor vital signs pertinent to nutrition.    RD following and will provide updated recommendations as indicated.      Ofe Ken R.D.                                         [facility]/AND CRITERIA FOR MALNUTRITION

## 2025-04-15 NOTE — PROGRESS NOTES
Communication with md pt wants to leave [facility]a. Md states she can  meds if she desires and follow up. Pt states she will

## 2025-04-15 NOTE — PROGRESS NOTES
PT is alert and oriented. Pt complaining of a headache,pt scoring a 9 for CIWA score, pt refusing ativan. Bed alarm placed. Call light and personal belongings within reach. Pt complaining of a headache, Tylenol given.

## 2025-04-17 ENCOUNTER — PHARMACY VISIT (OUTPATIENT)
Dept: PHARMACY | Facility: MEDICAL CENTER | Age: 40
End: 2025-04-17
Payer: MEDICARE

## 2025-04-17 ENCOUNTER — OFFICE VISIT (OUTPATIENT)
Dept: NEUROLOGY | Facility: MEDICAL CENTER | Age: 40
End: 2025-04-17
Attending: STUDENT IN AN ORGANIZED HEALTH CARE EDUCATION/TRAINING PROGRAM
Payer: COMMERCIAL

## 2025-04-17 VITALS
BODY MASS INDEX: 21.01 KG/M2 | OXYGEN SATURATION: 99 % | HEIGHT: 65 IN | HEART RATE: 103 BPM | WEIGHT: 126.1 LBS | SYSTOLIC BLOOD PRESSURE: 110 MMHG | DIASTOLIC BLOOD PRESSURE: 74 MMHG | TEMPERATURE: 98.2 F

## 2025-04-17 DIAGNOSIS — G40.109 FOCAL EPILEPSY (HCC): ICD-10-CM

## 2025-04-17 DIAGNOSIS — F10.10 ALCOHOL ABUSE: ICD-10-CM

## 2025-04-17 DIAGNOSIS — G40.409: ICD-10-CM

## 2025-04-17 DIAGNOSIS — R56.9 INCREASING FREQUENCY OF SEIZURE ACTIVITY (HCC): ICD-10-CM

## 2025-04-17 DIAGNOSIS — G40.409 TONIC CLONIC SEIZURES (HCC): ICD-10-CM

## 2025-04-17 DIAGNOSIS — I60.9 SAH (SUBARACHNOID HEMORRHAGE) (HCC): ICD-10-CM

## 2025-04-17 DIAGNOSIS — F10.139 ALCOHOL ABUSE WITH WITHDRAWAL (HCC): ICD-10-CM

## 2025-04-17 DIAGNOSIS — Z15.1: ICD-10-CM

## 2025-04-17 DIAGNOSIS — F10.230 ALCOHOL DEPENDENCE WITH UNCOMPLICATED WITHDRAWAL (HCC): ICD-10-CM

## 2025-04-17 DIAGNOSIS — R79.89 ELEVATED LFTS: ICD-10-CM

## 2025-04-17 DIAGNOSIS — K70.10 ALCOHOLIC HEPATITIS, UNSPECIFIED WHETHER ASCITES PRESENT: ICD-10-CM

## 2025-04-17 DIAGNOSIS — R56.9 CONVULSIONS, UNSPECIFIED CONVULSION TYPE (HCC): ICD-10-CM

## 2025-04-17 DIAGNOSIS — F32.A DEPRESSION, UNSPECIFIED DEPRESSION TYPE: ICD-10-CM

## 2025-04-17 DIAGNOSIS — R56.9 SEIZURES (HCC): ICD-10-CM

## 2025-04-17 DIAGNOSIS — F17.200 TOBACCO DEPENDENCE: ICD-10-CM

## 2025-04-17 DIAGNOSIS — F33.1 MODERATE EPISODE OF RECURRENT MAJOR DEPRESSIVE DISORDER (HCC): ICD-10-CM

## 2025-04-17 DIAGNOSIS — Z91.81 RISK FOR FALLS: ICD-10-CM

## 2025-04-17 DIAGNOSIS — R29.818 AURA: ICD-10-CM

## 2025-04-17 DIAGNOSIS — R40.4 TRANSIENT ALTERATION OF AWARENESS: ICD-10-CM

## 2025-04-17 DIAGNOSIS — Z91.51 HX OF SUICIDE ATTEMPT: ICD-10-CM

## 2025-04-17 PROCEDURE — 3078F DIAST BP <80 MM HG: CPT | Performed by: STUDENT IN AN ORGANIZED HEALTH CARE EDUCATION/TRAINING PROGRAM

## 2025-04-17 PROCEDURE — 3074F SYST BP LT 130 MM HG: CPT | Performed by: STUDENT IN AN ORGANIZED HEALTH CARE EDUCATION/TRAINING PROGRAM

## 2025-04-17 PROCEDURE — 99212 OFFICE O/P EST SF 10 MIN: CPT | Performed by: STUDENT IN AN ORGANIZED HEALTH CARE EDUCATION/TRAINING PROGRAM

## 2025-04-17 PROCEDURE — 99215 OFFICE O/P EST HI 40 MIN: CPT | Performed by: STUDENT IN AN ORGANIZED HEALTH CARE EDUCATION/TRAINING PROGRAM

## 2025-04-17 PROCEDURE — 99417 PROLNG OP E/M EACH 15 MIN: CPT | Performed by: STUDENT IN AN ORGANIZED HEALTH CARE EDUCATION/TRAINING PROGRAM

## 2025-04-17 RX ORDER — MIDAZOLAM 5 MG/.1ML
5 SPRAY NASAL PRN
Qty: 4 EACH | Refills: 0 | Status: SHIPPED | OUTPATIENT
Start: 2025-04-17 | End: 2025-05-17

## 2025-04-17 RX ORDER — CLONAZEPAM 1 MG/1
1 TABLET ORAL
Qty: 10 TABLET | Refills: 0 | Status: SHIPPED | OUTPATIENT
Start: 2025-04-17 | End: 2025-05-17

## 2025-04-17 ASSESSMENT — PATIENT HEALTH QUESTIONNAIRE - PHQ9
CLINICAL INTERPRETATION OF PHQ2 SCORE: 3
SUM OF ALL RESPONSES TO PHQ QUESTIONS 1-9: 16
5. POOR APPETITE OR OVEREATING: 3 - NEARLY EVERY DAY

## 2025-04-17 ASSESSMENT — FIBROSIS 4 INDEX: FIB4 SCORE: 7.76

## 2025-04-17 NOTE — PROGRESS NOTES
"NEUROLOGY NEW PATIENT ENCOUNTER - 04/17/2025   REFERRING PROVIDER:  Paco Raya M.D.  [address] [place] [address]  Paco Raya   REASON FOR VISIT: Franchesca Ortez 39 y.o. female presents today to establish care with me.    SUMMARY OF PROBLEMS AND/OR DIAGNOSES:  This patient has been referred to me for evaluation and management of seizures. Per referring provider's note:    \"his is a 39 y.o. female here with Detox (BIB ems from home. Pt states she would like to detox so she can go to Rehab. She states she drinks about 1pint of vodka per day and that her last drink was yesterday. Pt very appropriate during triage. )  Please review HERNESTO FaithO. notes for further details of history of present illness, past medical/social/family histories, allergies and medications.   Managied for alcohol withdrawal and one episode of seizure like activity. She has a history of seizures followed by [facility] Mccarthy and she has no drivers license, she also has a cavernoma which she was to see Neurosurgery as a potential cause of her seizures. She snokes cigarettes and drinks alcohol. She was being detoxed here and I ordered thiamine and vitamins along with the PRN benzos. She however felt she could leave, did not want to continue withdrawal period and DECLINED MRI thus left AGAINST MEDICAL ADVICE.     ________________________________________      Onset:   June 13, 2023  Semiology(ies):   Type 1:  Post-ictal   Last seizure:   ***  History of Status epilepticus:   ***  Current AED regimen:   ***  Rescue Therapy  ***  Past AED’s:   ***  DIAGNOSTICS  Prior EEG’s:   ***  Prior brain imaging:   MRI Brain October 2024:    Driving status:   ***  School/work status:   ***  Psychiatric Comorbidities:  ***  Depression Screening    Little interest or pleasure in doing things?  2 - more than half the days   Feeling down, depressed , or hopeless? 1 - several days   Trouble falling or staying asleep, or sleeping too " "much?  2 - more than half the days   Feeling tired or having little energy?  2 - more than half the days   Poor appetite or overeating?  3 - nearly every day   Feeling bad about yourself - or that you are a failure or have let yourself or your family down? 2 - more than half the days   Trouble concentrating on things, such as reading the newspaper or watching television? 2 - more than half the days   Moving or speaking so slowly that other people could have noticed.  Or the opposite - being so fidgety or restless that you have been moving around a lot more than usual?  2 - more than half the days   Thoughts that you would be better off dead, or of hurting yourself?  0 - not at all   Patient Health Questionnaire Score: 16       If depressive symptoms identified deferred to follow up visit unless specifically addressed in assesment and plan.    Interpretation of PHQ-9 Total Score   Score Severity   1-4 No Depression   5-9 Mild Depression   10-14 Moderate Depression   15-19 Moderately Severe Depression   20-27 Severe Depression       Epilepsy Risk Factors (CHECK MARKS INDICATE \"YES\"):   [] Head trauma with LOC   [] History of meningitis/encephalitis/abscess in or around the brain  [] Febrile Seizures  [] Family History of seizures and/or Epilepsy  [] History of developmental delay, grade school IEPs, or significant struggles in one or more areas of academics or learning  [] Birth complications   [] History of brain lesion/scarring from any cause (ie brain tumour, brain surgery, brain bleed, cortical stroke, neurodegenerative disease, gliosis from head trauma, neurocystocicercosis  etc)        RELEVANT DATA PERSONALLY REVIEWED:     Patient's PMH, PSH, FH, SH, allergies, and medications were reviewed:   has a past medical history of Alcohol abuse, Depression, History of suicide attempt, and Seizure (HCC).    has a past surgical history that includes gyn surgery and primary c section.   family history includes Cancer in " "her maternal grandmother; Diabetes in her father; Hypertension in her father.    reports that she has been smoking cigarettes. She has a 7.5 pack-year smoking history. She has never used smokeless tobacco. She reports current alcohol use. She reports current drug use. Drug: Inhaled.     ROS negative except that which was mentioned above.    CURRENT MEDICATIONS AT THE TIME OF THIS ENCOUNTER:  Current Outpatient Medications on File Prior to Visit   Medication Sig Dispense Refill   • levETIRAcetam (KEPPRA) 500 MG Tab Take 2 tablets (1000 mg) orally in the morning and 2 tablets (1000 mg)  in the evening about 12 hours apart. (Patient taking differently: Take 1,000 mg by mouth 2 times a day. Take 2 tablets (1000 mg) orally in the morning and 2 tablets (1000 mg)  in the evening about 12 hours apart.) 360 Tablet 6   • acetaminophen (TYLENOL) 325 MG Tab Take 2 Tablets by mouth every 6 hours as needed for Mild Pain, Moderate Pain or Fever. (Patient not taking: Reported on 4/17/2025)     • amoxicillin-clavulanate (AUGMENTIN) 875-125 MG Tab Take 1 Tablet by mouth 2 times a day for 4 days. (Patient not taking: Reported on 4/17/2025) 8 Tablet 0   • folic acid (FOLVITE) 1 MG Tab Take 1 Tablet by mouth every day. (Patient not taking: Reported on 4/17/2025)     • multivitamin Tab Take 1 Tablet by mouth every day. (Patient not taking: Reported on 4/17/2025)     • thiamine (THIAMINE) 100 MG tablet Take 1 Tablet by mouth every day. (Patient not taking: Reported on 4/17/2025)       No current facility-administered medications on file prior to visit.        EXAM:   Ambulatory Vitals:  /74 (BP Location: Right arm, Patient Position: Sitting, BP Cuff Size: Adult)   Pulse (!) 103   Temp 36.8 °C (98.2 °F) (Temporal)   Ht 1.651 m (5' 5\")   Wt 57.2 kg (126 lb 1.7 oz)   SpO2 99%    Physical Exam:  Physical Exam   Neurological Exam   Neurological Exam     ASSESSMENT, EDUCATION, COUNSELING:  This is a 39 y.o. female patient who " presents to the neurology clinic. We had an extensive discussion about the patient's symptoms, signs, and work-up to date, if any. We discussed potential and/or definitive diagnoses, work-up, and potential treatments. \    Visit Diagnoses     ICD-10-CM   1. 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 Tonic clonic seizures (HCC)  G40.409   3. 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 Convulsions, unspecified convulsion type (HCC)  R56.9   5. Seizures (HCC)  R56.9   6. Aura  R29.818   7. Tobacco dependence  F17.200   8. Alcohol abuse with withdrawal (HCC)  F10.139   9. ETOH abuse  F10.10   10. Elevated LFTs  R79.89   11. Depression, unspecified depression type  F32.A   12. Alcoholic hepatitis, unspecified whether ascites present  K70.10   13. Hx of suicide attempt  Z91.51   14. SAH (subarachnoid hemorrhage) (HCC)  I60.9   15. Alcohol dependence with uncomplicated withdrawal (HCC)  F10.230   16. Transient alteration of awareness  R40.4   17. Increasing frequency of seizure activity (HCC)  R56.9      Problem List Items Addressed This Visit          Neurology Medicine Problems    Tobacco dependence       Other    Alcohol abuse with withdrawal (HCC)    Alcoholic hepatitis    Depression    Elevated LFTs    ETOH abuse    Hx of suicide attempt    SAH (subarachnoid hemorrhage) (HCC)    Relevant Orders    MR-BRAIN-WITH & W/O     Other Visit Diagnoses         Focal epilepsy (HCC)        Relevant Medications    eslicarbazepine (APTIOM) 400 MG Tab    clonazePAM (KLONOPIN) 1 MG Tab    Midazolam (NAYZILAM) 5 MG/0.1ML Solution    Other Relevant Orders    EEG    MR-BRAIN-WITH & W/O      Tonic clonic seizures (HCC)        Relevant Medications    eslicarbazepine (APTIOM) 400 MG Tab    clonazePAM (KLONOPIN) 1 MG Tab    Midazolam (NAYZILAM) 5 MG/0.1ML Solution    Other Relevant Orders    EEG    MR-BRAIN-WITH & W/O      Genetic generalized epilepsy (HCC)        Relevant Medications    eslicarbazepine (APTIOM) 400 MG Tab     clonazePAM (KLONOPIN) 1 MG Tab    Midazolam (NAYZILAM) 5 MG/0.1ML Solution    Other Relevant Orders    EEG      Convulsions, unspecified convulsion type (HCC)        Relevant Medications    eslicarbazepine (APTIOM) 400 MG Tab    clonazePAM (KLONOPIN) 1 MG Tab    Midazolam (NAYZILAM) 5 MG/0.1ML Solution    Other Relevant Orders    EEG    MR-BRAIN-WITH & W/O      Seizures (HCC)        Relevant Medications    eslicarbazepine (APTIOM) 400 MG Tab    clonazePAM (KLONOPIN) 1 MG Tab    Midazolam (NAYZILAM) 5 MG/0.1ML Solution    Other Relevant Orders    EEG    MR-BRAIN-WITH & W/O      Aura        Relevant Medications    clonazePAM (KLONOPIN) 1 MG Tab    Other Relevant Orders    MR-BRAIN-WITH & W/O      Alcohol dependence with uncomplicated withdrawal (HCC)          Transient alteration of awareness        Relevant Orders    EEG      Increasing frequency of seizure activity (HCC)        Relevant Medications    eslicarbazepine (APTIOM) 400 MG Tab    clonazePAM (KLONOPIN) 1 MG Tab    Midazolam (NAYZILAM) 5 MG/0.1ML Solution    Other Relevant Orders    MR-BRAIN-WITH & W/O             PLAN:  Medications administered in today's encounter if applicable:       If applicable, the work-up such as labs, imaging, procedures, and/or other testing, referrals, and/or recommended treatment strategies are listed below.  Orders Placed This Encounter   • MR-BRAIN-WITH & W/O   • EEG   • eslicarbazepine (APTIOM) 400 MG Tab   • clonazePAM (KLONOPIN) 1 MG Tab   • Midazolam (NAYZILAM) 5 MG/0.1ML Solution     Lab Frequency Next Occurrence   LEVETIRACETAM (KEPPRA), S Every 4 Weeks          Medication List            Accurate as of April 17, 2025  3:23 PM. If you have any questions, ask your nurse or doctor.                START taking these medications        Instructions   clonazePAM 1 MG Tabs  Commonly known as: KlonoPIN  Started by: Dr. Danis Wallace   Doctor's comments: Request 10 tabs of 1 mg to cover 30 days.  Take 1 Tablet by mouth 1 time a  day as needed (seizures/aura OR impending seizure. Do not take more than 2 mg in 24 hours.) for up to 30 days.  Dose: 1 mg     eslicarbazepine 400 MG Tabs  Start taking on: April 17, 2025  Commonly known as: Aptiom  Started by: Dr. Danis Wallace   Take 1 Tablet by mouth every evening for 7 days, THEN 2 Tablets every evening for 14 days, THEN 3 Tablets every evening for 90 days.     Nayzilam 5 MG/0.1ML Soln  Generic drug: Midazolam  Started by: Dr. Danis Wallace   Doctor's comments: Request 4 spray bottles of 5 mg to cover 30 days  Administer 5 mg into affected nostril(S) as needed (Give 5 mg as 1 spray in 1 nostril for any tonic clonic seizure or any seizure > 1 minutes; repeat in 5 min in alternate nostril if needed. maximum dose: 10 mg/dose per episode (2 sprays);) for up to 30 days. Indications: Seizure  Dose: 5 mg            CHANGE how you take these medications        Instructions   levETIRAcetam 500 MG Tabs  What changed:   how much to take  how to take this  when to take this  Commonly known as: Keppra   Take 2 tablets (1000 mg) orally in the morning and 2 tablets (1000 mg)  in the evening about 12 hours apart.            CONTINUE taking these medications        Instructions   acetaminophen 325 MG Tabs  Commonly known as: Tylenol   Take 2 Tablets by mouth every 6 hours as needed for Mild Pain, Moderate Pain or Fever.  Dose: 650 mg     amoxicillin-clavulanate 875-125 MG Tabs  Commonly known as: Augmentin   Take 1 Tablet by mouth 2 times a day for 4 days.  Dose: 1 Tablet     folic acid 1 MG Tabs  Commonly known as: Folvite   Take 1 Tablet by mouth every day.  Dose: 1 mg     multivitamin Tabs   Take 1 Tablet by mouth every day.  Dose: 1 Tablet     thiamine 100 MG tablet  Commonly known as: Thiamine   Take 1 Tablet by mouth every day.  Dose: 100 mg                 Get updated Brain MRI w/wo contradst Epi protocol given frequency, change in her seizures. Attention to the left superior frontal pole  cavernoma.    Starting aptiom 400 mg and increasing over 2 weeks to 1200 mg per day. She should contonue keppra 500 mg twice per day for now. Alterantive incluide Xcopri, Briviact, Lamictal, vimpat.    Patient needs a 72 hour ambulaotry EEG so I can concifrm that she does in fact have an epilweptic disorder, chacrterize it, ensure she is not having ocult seizures. Will consider EMU admission if need be. She may be a good surgical candiate so this is something we will discus in the future    Seizure action plan:  Abortive medications: clonzepam 1 mg as needed for seizures/auras. Will only prescribe 10 tabs for 30 days given the risk of tolerance/dependnence/cross-tolerance. I feel beneifts of this outweigh the risk as she is high risk for sudden unexpected death in epilepsuy. Also prescribing rewcue Nayzilam to use as neede dot abort and acti ve seizure and to prevent seizure clusters    Abstan from alcohol. Will follow-up very lcosely on this    Glycinate Glcyinate 1000 mg for insomnia    Follow-up in 3 months at which time we will obtain repeat blood work    BILLING DOCUMENTATION:     I spent a total of  No LOS data to display   minutes of face-to-face time in this visit. Over 50% of the time of the visit today was spent on counseling and/or coordination of care wtih the patient and/or family, as above in assessment in plan.    Danis Wallace MD  Epilepsy and General Neurology  Department of Neurology  Clinical  of Neurology [facility] [employer].      mg     multivitamin Tabs   Take 1 Tablet by mouth every day.  Dose: 1 Tablet     thiamine 100 MG tablet  Commonly known as: Thiamine   Take 1 Tablet by mouth every day.  Dose: 100 mg                 Get updated Brain MRI w/wo contradst Epi protocol given frequency, change in her seizures. Attention to the left superior frontal pole cavernoma.    Starting aptiom 400 mg and increasing over 2 weeks to 1200 mg per day. She should contonue keppra 500 mg twice per day for now. Alterantive incluide Xcopri, Briviact, Lamictal, vimpat.    Patient needs a 72 hour ambulaotry EEG so I can concifrm that she does in fact have an epilweptic disorder, chacrterize it, ensure she is not having ocult seizures. Will consider EMU admission if need be. She may be a good surgical candiate so this is something we will discus in the future    Seizure action plan:  Abortive medications: clonzepam 1 mg as needed for seizures/auras. Will only prescribe 10 tabs for 30 days given the risk of tolerance/dependnence/cross-tolerance. I feel beneifts of this outweigh the risk as she is high risk for sudden unexpected death in epilepsuy. Also prescribing rewcue Nayzilam to use as neede dot abort and acti ve seizure and to prevent seizure clusters    Abstain from alcohol. Will continue to follow-up closely  on this.    Positive depression screening - will continue to closely follow-up on this./    Insomnia- Magnesium Glycinate 1000 mg at bedtime        Follow-up in 3 months at which time we will obtain repeat blood work    BILLING DOCUMENTATION:     I spent a total of  I spent a total of 65 minutes on the day of the visit.   minutes of face-to-face time in this visit. Over 50% of the time of the visit today was spent on counseling and/or coordination of care wtih the patient and/or family, as above in assessment in plan.    Danis Wallace MD  Epilepsy and General Neurology  Department of Neurology  Clinical  of Neurology [facility]  [facility].

## 2025-04-18 ENCOUNTER — TELEPHONE (OUTPATIENT)
Dept: NEUROLOGY | Facility: MEDICAL CENTER | Age: 40
End: 2025-04-18
Payer: COMMERCIAL

## 2025-04-18 NOTE — TELEPHONE ENCOUNTER
Prior Authorization for APTIOM 400MG (Quantity: 60, Days: 30) has been submitted via [id number]    Insurance: [employer]   Member ID:  [id number]  BIN: [id number]  PCN: [id number]  Group: [id number]     Will follow up in 24-48 business hours.

## 2025-04-18 NOTE — TELEPHONE ENCOUNTER
Received [employer] request via [employer]  for APTIOM 400MG. (Quantity:305, Day Supply:90)     Insurance: [employer]   Member ID:  [id number]  BIN: [id number]  PCN: [id number]  Group: [id number]     Ran Test claim via [employer] [place] & medication Rejects stating prior authorization is required.

## 2025-04-23 NOTE — Clinical Note
REFERRAL APPROVAL NOTICE         Sent on April 23, 2025                   Franchesca Ortez  [address]  [address]  [place] [address]                   Dear Ms. Ortez,    After a careful review of the medical information and benefit coverage, [facility] has processed your referral. See below for additional details.    If applicable, you must be actively enrolled with your insurance for coverage of the authorized service. If you have any questions regarding your coverage, please contact your insurance directly.    REFERRAL INFORMATION   Referral #:  [id number]  Referred-To Provider    Referred-By Provider:  LAANA Pierson SHANE      [address]  [place] [address]  [phone] [address]  [address]  [place] [address] [phone]    Referral Start Date:  04/15/2025  Referral End Date:   04/15/2026             SCHEDULING  If you do not already have an appointment, please call [phone] to make an appointment.     MORE INFORMATION  If you do not already have a [employer] account, sign up at: [email or web address]  You can access your medical information, make appointments, see lab results, billing information, and more.  If you have questions regarding this referral, please contact  the [facility] Referrals department at:             [phone]. Monday - Friday 8:00AM - 5:00PM.     Sincerely,    [facility]

## 2025-04-25 NOTE — TELEPHONE ENCOUNTER
Prior Authorization for Aptiom 400mg has been DENIED for a quantity of 60 , day supply 30    Prior authorization was denied per the following:   patient must show frailer to 1 more preferred drug list include   Carbamazepine  Lamotrigine  Lacosamide  Oxcarbazepine  Topiramate  Zonisamide  Phenytoin  Divalproex sodium    Prior Authorization denial reference number: [id number]   Insurance: [employer]   Member ID:  [id number]  BIN: [id number]  PCN: [id number]  Group: [id number]       Next Steps:Please advised on how to proceed?

## 2025-05-11 ENCOUNTER — HOSPITAL ENCOUNTER (INPATIENT)
Facility: MEDICAL CENTER | Age: 40
LOS: 1 days | DRG: 641 | End: 2025-05-12
Attending: EMERGENCY MEDICINE | Admitting: STUDENT IN AN ORGANIZED HEALTH CARE EDUCATION/TRAINING PROGRAM
Payer: COMMERCIAL

## 2025-05-11 DIAGNOSIS — R56.9 SEIZURE (HCC): ICD-10-CM

## 2025-05-11 DIAGNOSIS — E83.42 HYPOMAGNESEMIA: ICD-10-CM

## 2025-05-11 LAB
ALBUMIN SERPL BCP-MCNC: 4.1 G/DL (ref 3.2–4.9)
ALBUMIN/GLOB SERPL: 1.3 G/DL
ALP SERPL-CCNC: 58 U/L (ref 30–99)
ALT SERPL-CCNC: 48 U/L (ref 2–50)
ANION GAP SERPL CALC-SCNC: 24 MMOL/L (ref 7–16)
AST SERPL-CCNC: 84 U/L (ref 12–45)
BASOPHILS # BLD AUTO: 0.4 % (ref 0–1.8)
BASOPHILS # BLD: 0.04 K/UL (ref 0–0.12)
BILIRUB SERPL-MCNC: 0.9 MG/DL (ref 0.1–1.5)
BUN SERPL-MCNC: 16 MG/DL (ref 8–22)
CALCIUM ALBUM COR SERPL-MCNC: 8.8 MG/DL (ref 8.5–10.5)
CALCIUM SERPL-MCNC: 8.9 MG/DL (ref 8.5–10.5)
CHLORIDE SERPL-SCNC: 96 MMOL/L (ref 96–112)
CO2 SERPL-SCNC: 13 MMOL/L (ref 20–33)
CREAT SERPL-MCNC: 0.64 MG/DL (ref 0.5–1.4)
EKG IMPRESSION: NORMAL
EOSINOPHIL # BLD AUTO: 0.02 K/UL (ref 0–0.51)
EOSINOPHIL NFR BLD: 0.2 % (ref 0–6.9)
ERYTHROCYTE [DISTWIDTH] IN BLOOD BY AUTOMATED COUNT: 48 FL (ref 35.9–50)
GFR SERPLBLD CREATININE-BSD FMLA CKD-EPI: 115 ML/MIN/1.73 M 2
GLOBULIN SER CALC-MCNC: 3.1 G/DL (ref 1.9–3.5)
GLUCOSE SERPL-MCNC: 188 MG/DL (ref 65–99)
HCT VFR BLD AUTO: 33.2 % (ref 37–47)
HGB BLD-MCNC: 11.3 G/DL (ref 12–16)
IMM GRANULOCYTES # BLD AUTO: 0.08 K/UL (ref 0–0.11)
IMM GRANULOCYTES NFR BLD AUTO: 0.9 % (ref 0–0.9)
LACTATE SERPL-SCNC: 1.5 MMOL/L (ref 0.5–2)
LYMPHOCYTES # BLD AUTO: 0.51 K/UL (ref 1–4.8)
LYMPHOCYTES NFR BLD: 5.5 % (ref 22–41)
MAGNESIUM SERPL-MCNC: 0.8 MG/DL (ref 1.5–2.5)
MCH RBC QN AUTO: 35 PG (ref 27–33)
MCHC RBC AUTO-ENTMCNC: 34 G/DL (ref 32.2–35.5)
MCV RBC AUTO: 102.8 FL (ref 81.4–97.8)
MONOCYTES # BLD AUTO: 0.52 K/UL (ref 0–0.85)
MONOCYTES NFR BLD AUTO: 5.6 % (ref 0–13.4)
NEUTROPHILS # BLD AUTO: 8.04 K/UL (ref 1.82–7.42)
NEUTROPHILS NFR BLD: 87.4 % (ref 44–72)
NRBC # BLD AUTO: 0 K/UL
NRBC BLD-RTO: 0 /100 WBC (ref 0–0.2)
PLATELET # BLD AUTO: 80 K/UL (ref 164–446)
PLATELETS.RETICULATED NFR BLD AUTO: 2.7 % (ref 0.6–13.1)
PMV BLD AUTO: 10 FL (ref 9–12.9)
POC BREATHALIZER: 0 PERCENT (ref 0–0.01)
POTASSIUM SERPL-SCNC: 3.9 MMOL/L (ref 3.6–5.5)
PROT SERPL-MCNC: 7.2 G/DL (ref 6–8.2)
RBC # BLD AUTO: 3.23 M/UL (ref 4.2–5.4)
SODIUM SERPL-SCNC: 133 MMOL/L (ref 135–145)
WBC # BLD AUTO: 9.2 K/UL (ref 4.8–10.8)

## 2025-05-11 PROCEDURE — 99291 CRITICAL CARE FIRST HOUR: CPT | Mod: 25 | Performed by: STUDENT IN AN ORGANIZED HEALTH CARE EDUCATION/TRAINING PROGRAM

## 2025-05-11 PROCEDURE — A9270 NON-COVERED ITEM OR SERVICE: HCPCS | Performed by: STUDENT IN AN ORGANIZED HEALTH CARE EDUCATION/TRAINING PROGRAM

## 2025-05-11 PROCEDURE — 770020 HCHG ROOM/CARE - TELE (206)

## 2025-05-11 PROCEDURE — 83735 ASSAY OF MAGNESIUM: CPT

## 2025-05-11 PROCEDURE — 36415 COLL VENOUS BLD VENIPUNCTURE: CPT

## 2025-05-11 PROCEDURE — 80053 COMPREHEN METABOLIC PANEL: CPT

## 2025-05-11 PROCEDURE — HZ2ZZZZ DETOXIFICATION SERVICES FOR SUBSTANCE ABUSE TREATMENT: ICD-10-PCS | Performed by: STUDENT IN AN ORGANIZED HEALTH CARE EDUCATION/TRAINING PROGRAM

## 2025-05-11 PROCEDURE — 700105 HCHG RX REV CODE 258: Performed by: STUDENT IN AN ORGANIZED HEALTH CARE EDUCATION/TRAINING PROGRAM

## 2025-05-11 PROCEDURE — 302970 POC BREATHALIZER

## 2025-05-11 PROCEDURE — 700102 HCHG RX REV CODE 250 W/ 637 OVERRIDE(OP): Performed by: STUDENT IN AN ORGANIZED HEALTH CARE EDUCATION/TRAINING PROGRAM

## 2025-05-11 PROCEDURE — 93005 ELECTROCARDIOGRAM TRACING: CPT | Mod: TC | Performed by: EMERGENCY MEDICINE

## 2025-05-11 PROCEDURE — 85055 RETICULATED PLATELET ASSAY: CPT

## 2025-05-11 PROCEDURE — 96365 THER/PROPH/DIAG IV INF INIT: CPT

## 2025-05-11 PROCEDURE — 700111 HCHG RX REV CODE 636 W/ 250 OVERRIDE (IP): Performed by: STUDENT IN AN ORGANIZED HEALTH CARE EDUCATION/TRAINING PROGRAM

## 2025-05-11 PROCEDURE — 96375 TX/PRO/DX INJ NEW DRUG ADDON: CPT

## 2025-05-11 PROCEDURE — 83605 ASSAY OF LACTIC ACID: CPT

## 2025-05-11 PROCEDURE — 99285 EMERGENCY DEPT VISIT HI MDM: CPT

## 2025-05-11 PROCEDURE — 302970 POC BREATHALIZER: Performed by: EMERGENCY MEDICINE

## 2025-05-11 PROCEDURE — 700111 HCHG RX REV CODE 636 W/ 250 OVERRIDE (IP): Mod: JZ,UD | Performed by: EMERGENCY MEDICINE

## 2025-05-11 PROCEDURE — 96366 THER/PROPH/DIAG IV INF ADDON: CPT

## 2025-05-11 PROCEDURE — 99406 BEHAV CHNG SMOKING 3-10 MIN: CPT | Performed by: STUDENT IN AN ORGANIZED HEALTH CARE EDUCATION/TRAINING PROGRAM

## 2025-05-11 PROCEDURE — 96372 THER/PROPH/DIAG INJ SC/IM: CPT

## 2025-05-11 PROCEDURE — 85025 COMPLETE CBC W/AUTO DIFF WBC: CPT

## 2025-05-11 RX ORDER — LORAZEPAM 0.5 MG/1
0.5 TABLET ORAL EVERY 4 HOURS PRN
Status: DISCONTINUED | OUTPATIENT
Start: 2025-05-11 | End: 2025-05-12 | Stop reason: HOSPADM

## 2025-05-11 RX ORDER — OXYCODONE HYDROCHLORIDE 5 MG/1
2.5 TABLET ORAL
Refills: 0 | Status: DISCONTINUED | OUTPATIENT
Start: 2025-05-11 | End: 2025-05-12 | Stop reason: HOSPADM

## 2025-05-11 RX ORDER — HYDROMORPHONE HYDROCHLORIDE 1 MG/ML
0.25 INJECTION, SOLUTION INTRAMUSCULAR; INTRAVENOUS; SUBCUTANEOUS
Status: DISCONTINUED | OUTPATIENT
Start: 2025-05-11 | End: 2025-05-12 | Stop reason: HOSPADM

## 2025-05-11 RX ORDER — OXYCODONE HYDROCHLORIDE 5 MG/1
5 TABLET ORAL
Refills: 0 | Status: DISCONTINUED | OUTPATIENT
Start: 2025-05-11 | End: 2025-05-12 | Stop reason: HOSPADM

## 2025-05-11 RX ORDER — LABETALOL HYDROCHLORIDE 5 MG/ML
10 INJECTION, SOLUTION INTRAVENOUS EVERY 4 HOURS PRN
Status: DISCONTINUED | OUTPATIENT
Start: 2025-05-11 | End: 2025-05-12 | Stop reason: HOSPADM

## 2025-05-11 RX ORDER — DIAZEPAM 10 MG/2ML
10 INJECTION, SOLUTION INTRAMUSCULAR; INTRAVENOUS
Status: DISCONTINUED | OUTPATIENT
Start: 2025-05-11 | End: 2025-05-12 | Stop reason: HOSPADM

## 2025-05-11 RX ORDER — ENOXAPARIN SODIUM 100 MG/ML
40 INJECTION SUBCUTANEOUS DAILY
Status: DISCONTINUED | OUTPATIENT
Start: 2025-05-11 | End: 2025-05-12 | Stop reason: HOSPADM

## 2025-05-11 RX ORDER — ONDANSETRON 2 MG/ML
4 INJECTION INTRAMUSCULAR; INTRAVENOUS EVERY 4 HOURS PRN
Status: DISCONTINUED | OUTPATIENT
Start: 2025-05-11 | End: 2025-05-12 | Stop reason: HOSPADM

## 2025-05-11 RX ORDER — PROCHLORPERAZINE EDISYLATE 5 MG/ML
5-10 INJECTION INTRAMUSCULAR; INTRAVENOUS EVERY 4 HOURS PRN
Status: DISCONTINUED | OUTPATIENT
Start: 2025-05-11 | End: 2025-05-12 | Stop reason: HOSPADM

## 2025-05-11 RX ORDER — ONDANSETRON 4 MG/1
4 TABLET, ORALLY DISINTEGRATING ORAL EVERY 4 HOURS PRN
Status: DISCONTINUED | OUTPATIENT
Start: 2025-05-11 | End: 2025-05-12 | Stop reason: HOSPADM

## 2025-05-11 RX ORDER — SODIUM CHLORIDE 9 MG/ML
1000 INJECTION, SOLUTION INTRAVENOUS ONCE
Status: COMPLETED | OUTPATIENT
Start: 2025-05-11 | End: 2025-05-11

## 2025-05-11 RX ORDER — LORAZEPAM 2 MG/1
2 TABLET ORAL
Status: DISCONTINUED | OUTPATIENT
Start: 2025-05-11 | End: 2025-05-12 | Stop reason: HOSPADM

## 2025-05-11 RX ORDER — THIAMINE HYDROCHLORIDE 100 MG/ML
100 INJECTION, SOLUTION INTRAMUSCULAR; INTRAVENOUS DAILY
Status: DISCONTINUED | OUTPATIENT
Start: 2025-05-11 | End: 2025-05-12 | Stop reason: HOSPADM

## 2025-05-11 RX ORDER — LORAZEPAM 2 MG/1
4 TABLET ORAL
Status: DISCONTINUED | OUTPATIENT
Start: 2025-05-11 | End: 2025-05-12 | Stop reason: HOSPADM

## 2025-05-11 RX ORDER — MAGNESIUM SULFATE 1 G/100ML
1 INJECTION INTRAVENOUS ONCE
Status: COMPLETED | OUTPATIENT
Start: 2025-05-11 | End: 2025-05-11

## 2025-05-11 RX ORDER — LEVETIRACETAM 500 MG/1
1000 TABLET ORAL 2 TIMES DAILY
Status: DISCONTINUED | OUTPATIENT
Start: 2025-05-12 | End: 2025-05-12 | Stop reason: HOSPADM

## 2025-05-11 RX ORDER — PROMETHAZINE HYDROCHLORIDE 25 MG/1
12.5-25 TABLET ORAL EVERY 4 HOURS PRN
Status: DISCONTINUED | OUTPATIENT
Start: 2025-05-11 | End: 2025-05-12 | Stop reason: HOSPADM

## 2025-05-11 RX ORDER — LEVETIRACETAM 500 MG/5ML
1000 INJECTION, SOLUTION, CONCENTRATE INTRAVENOUS ONCE
Status: COMPLETED | OUTPATIENT
Start: 2025-05-11 | End: 2025-05-11

## 2025-05-11 RX ORDER — DIAZEPAM 10 MG/2ML
2.5 INJECTION, SOLUTION INTRAMUSCULAR; INTRAVENOUS ONCE
Status: COMPLETED | OUTPATIENT
Start: 2025-05-11 | End: 2025-05-11

## 2025-05-11 RX ORDER — LORAZEPAM 1 MG/1
1 TABLET ORAL EVERY 4 HOURS PRN
Status: DISCONTINUED | OUTPATIENT
Start: 2025-05-11 | End: 2025-05-12 | Stop reason: HOSPADM

## 2025-05-11 RX ORDER — PROMETHAZINE HYDROCHLORIDE 25 MG/1
12.5-25 SUPPOSITORY RECTAL EVERY 4 HOURS PRN
Status: DISCONTINUED | OUTPATIENT
Start: 2025-05-11 | End: 2025-05-12 | Stop reason: HOSPADM

## 2025-05-11 RX ORDER — MAGNESIUM SULFATE HEPTAHYDRATE 40 MG/ML
4 INJECTION, SOLUTION INTRAVENOUS ONCE
Status: COMPLETED | OUTPATIENT
Start: 2025-05-11 | End: 2025-05-11

## 2025-05-11 RX ORDER — SODIUM CHLORIDE 9 MG/ML
INJECTION, SOLUTION INTRAVENOUS CONTINUOUS
Status: DISCONTINUED | OUTPATIENT
Start: 2025-05-11 | End: 2025-05-12

## 2025-05-11 RX ADMIN — SODIUM CHLORIDE: 9 INJECTION, SOLUTION INTRAVENOUS at 20:13

## 2025-05-11 RX ADMIN — ENOXAPARIN SODIUM 40 MG: 100 INJECTION SUBCUTANEOUS at 18:09

## 2025-05-11 RX ADMIN — MAGNESIUM SULFATE IN DEXTROSE 1 G: 10 INJECTION, SOLUTION INTRAVENOUS at 17:09

## 2025-05-11 RX ADMIN — DIAZEPAM 2.5 MG: 10 INJECTION, SOLUTION INTRAMUSCULAR; INTRAVENOUS at 15:39

## 2025-05-11 RX ADMIN — MAGNESIUM SULFATE HEPTAHYDRATE 4 G: 4 INJECTION, SOLUTION INTRAVENOUS at 18:14

## 2025-05-11 RX ADMIN — THIAMINE HYDROCHLORIDE 100 MG: 100 INJECTION, SOLUTION INTRAMUSCULAR; INTRAVENOUS at 18:09

## 2025-05-11 RX ADMIN — LEVETIRACETAM 1000 MG: 100 INJECTION, SOLUTION INTRAVENOUS at 15:47

## 2025-05-11 RX ADMIN — FOLIC ACID 1 MG: 5 INJECTION, SOLUTION INTRAMUSCULAR; INTRAVENOUS; SUBCUTANEOUS at 20:28

## 2025-05-11 RX ADMIN — LORAZEPAM 0.5 MG: 1 TABLET ORAL at 18:09

## 2025-05-11 RX ADMIN — NICOTINE 7 MG: 7 PATCH TRANSDERMAL at 18:14

## 2025-05-11 RX ADMIN — SODIUM CHLORIDE 1000 ML: 9 INJECTION, SOLUTION INTRAVENOUS at 18:09

## 2025-05-11 ASSESSMENT — LIFESTYLE VARIABLES
ANXIETY: NO ANXIETY (AT EASE)
ANXIETY: NO ANXIETY (AT EASE)
TOTAL SCORE: 2
TREMOR: *
TOTAL SCORE: 4
ANXIETY: NO ANXIETY (AT EASE)
VISUAL DISTURBANCES: NOT PRESENT
AGITATION: NORMAL ACTIVITY
AUDITORY DISTURBANCES: NOT PRESENT
NAUSEA AND VOMITING: MILD NAUSEA WITH NO VOMITING
ORIENTATION AND CLOUDING OF SENSORIUM: ORIENTED AND CAN DO SERIAL ADDITIONS
VISUAL DISTURBANCES: NOT PRESENT
AGITATION: NORMAL ACTIVITY
HEADACHE, FULLNESS IN HEAD: VERY MILD
AGITATION: NORMAL ACTIVITY
HEADACHE, FULLNESS IN HEAD: VERY MILD
VISUAL DISTURBANCES: NOT PRESENT
AUDITORY DISTURBANCES: NOT PRESENT
PAROXYSMAL SWEATS: NO SWEAT VISIBLE
ORIENTATION AND CLOUDING OF SENSORIUM: ORIENTED AND CAN DO SERIAL ADDITIONS
TREMOR: TREMOR NOT VISIBLE BUT CAN BE FELT, FINGERTIP TO FINGERTIP
AUDITORY DISTURBANCES: NOT PRESENT
TREMOR: *
NAUSEA AND VOMITING: MILD NAUSEA WITH NO VOMITING
NAUSEA AND VOMITING: NO NAUSEA AND NO VOMITING
TOTAL SCORE: 7
PAROXYSMAL SWEATS: NO SWEAT VISIBLE
ORIENTATION AND CLOUDING OF SENSORIUM: ORIENTED AND CAN DO SERIAL ADDITIONS
HEADACHE, FULLNESS IN HEAD: NOT PRESENT
PAROXYSMAL SWEATS: NO SWEAT VISIBLE

## 2025-05-11 ASSESSMENT — FIBROSIS 4 INDEX
FIB4 SCORE: 5.76
FIB4 SCORE: 7.76

## 2025-05-11 ASSESSMENT — PAIN DESCRIPTION - PAIN TYPE
TYPE: ACUTE PAIN
TYPE: ACUTE PAIN

## 2025-05-11 ASSESSMENT — PATIENT HEALTH QUESTIONNAIRE - PHQ9
SUM OF ALL RESPONSES TO PHQ9 QUESTIONS 1 AND 2: 0
1. LITTLE INTEREST OR PLEASURE IN DOING THINGS: NOT AT ALL
2. FEELING DOWN, DEPRESSED, IRRITABLE, OR HOPELESS: NOT AT ALL

## 2025-05-11 NOTE — ED TRIAGE NOTES
"Chief Complaint   Patient presents with    Seizure     Unwitnessed seizure activity. +oral trauma, hematoma to forehead noted. Pt reports PMH brain tumor and seizures, states compliance with keppra. Denies thinners. Pt arrives AAOx3, GCS 15, spo2 02% RA.      /78   Pulse (!) 108   Temp 36.9 °C (98.4 °F) (Temporal)   Resp 18   Ht 1.651 m (5' 5\")   Wt 58.1 kg (128 lb)   SpO2 92%   BMI 21.30 kg/m²     BIBA from home for above cc. Pt has hx of daily etoh use - reports she does not remember when she last consumed etoh.    "

## 2025-05-11 NOTE — ED PROVIDER NOTES
ED Provider Note    CHIEF COMPLAINT  Chief Complaint   Patient presents with    Seizure     Unwitnessed seizure activity. +oral trauma, hematoma to forehead noted. Pt reports PMH brain tumor and seizures, states compliance with keppra. Denies thinners. Pt arrives AAOx3, GCS 15, spo2 02% RA.        EXTERNAL RECORDS REVIEWED  Inpatient Notes discharge summary from April 15, 2025, admission for alcohol detox, possible episode of alcohol withdrawal seizure.  After declining MRI, patient left AGAINST MEDICAL ADVICE    HPI/ROS  LIMITATION TO HISTORY   Select: : None  OUTSIDE HISTORIAN(S):  EMS gave report on the patient, condition en route to the hospital, chief complaint    Franchesca Ortez is a 39 y.o. female who presents from home by ambulance, witnessed grand mal seizure.  Patient states she has them every week.  She has a history of a cavernoma states this causes her to have seizures.  She states she has been compliant with her Keppra other than missing this morning's dose.  She denies neck pain.  No headache.  Patient admitted to the hospital for seizures approximate 1 month ago, unchanged cavernoma on her CT scan.  No acute numbness or weakness of extremities.  She denies chest pain or difficulty breathing, no abdominal pain.  Patient has history of alcohol abuse.  She denies drinking alcohol today, she does feel slightly tremulous.  No fever or chills.  No associated neck pain.  No vision change or speech difficulties.    Breathalyzer at 0.0 per nursing staff upon arrival    PAST MEDICAL HISTORY   has a past medical history of Alcohol abuse, Depression, History of suicide attempt, and Seizure (HCC).    SURGICAL HISTORY   has a past surgical history that includes gyn surgery and primary c section.    FAMILY HISTORY  Family History   Problem Relation Age of Onset    Hypertension Father     Diabetes Father     Cancer Maternal Grandmother         liver       SOCIAL HISTORY  Social History     Tobacco Use    Smoking  "status: Every Day     Current packs/day: 0.50     Average packs/day: 0.5 packs/day for 15.0 years (7.5 ttl pk-yrs)     Types: Cigarettes    Smokeless tobacco: Never    Tobacco comments:     3/4 pk a day   Vaping Use    Vaping status: Never Used   Substance and Sexual Activity    Alcohol use: Yes     Comment: Hx of 1 pint a day vodka. 1-2 drinks daily    Drug use: Yes     Types: Inhaled     Comment: marijuana    Sexual activity: Yes     Partners: Male     Birth control/protection: I.U.D.       CURRENT MEDICATIONS  Home Medications       Reviewed by Nga Bruce R.N. (Registered Nurse) on 05/11/25 at 1459  Med List Status: Partial     Medication Last Dose Status   acetaminophen (TYLENOL) 325 MG Tab  Active   clonazePAM (KLONOPIN) 1 MG Tab  Active   eslicarbazepine (APTIOM) 400 MG Tab  Active   folic acid (FOLVITE) 1 MG Tab  Active   levETIRAcetam (KEPPRA) 500 MG Tab  Active   Midazolam (NAYZILAM) 5 MG/0.1ML Solution  Active   multivitamin Tab  Active   thiamine (THIAMINE) 100 MG tablet  Active                    ALLERGIES  No Known Allergies    PHYSICAL EXAM  VITAL SIGNS: /78   Pulse (!) 108   Temp 36.9 °C (98.4 °F) (Temporal)   Resp 18   Ht 1.651 m (5' 5\")   Wt 58.1 kg (128 lb)   SpO2 93%   BMI 21.30 kg/m²    Cardiac: Tachycardic rate, regular rhythm  Respiratory: Clear lung sounds  GI: Abdomen is soft and nontender, no guarding  Musculoskeletal: No extremity tenderness.  Neck supple, range of motion without pain or stiffness  ENT: Tongue contusion/bite wound.  No lacerations.  No active hemorrhage.  Eyes: Pupils are equal, no nystagmus  Neurologic: Slightly tremulous.  Strength and sensation are normal.  Speech is clear.  She is alert and oriented    EKG/LABS  Results for orders placed or performed during the hospital encounter of 05/11/25   CBC WITH DIFFERENTIAL    Collection Time: 05/11/25  3:08 PM   Result Value Ref Range    WBC 9.2 4.8 - 10.8 K/uL    RBC 3.23 (L) 4.20 - 5.40 M/uL    " Hemoglobin 11.3 (L) 12.0 - 16.0 g/dL    Hematocrit 33.2 (L) 37.0 - 47.0 %    .8 (H) 81.4 - 97.8 fL    MCH 35.0 (H) 27.0 - 33.0 pg    MCHC 34.0 32.2 - 35.5 g/dL    RDW 48.0 35.9 - 50.0 fL    Platelet Count 80 (L) 164 - 446 K/uL    MPV 10.0 9.0 - 12.9 fL    Neutrophils-Polys 87.40 (H) 44.00 - 72.00 %    Lymphocytes 5.50 (L) 22.00 - 41.00 %    Monocytes 5.60 0.00 - 13.40 %    Eosinophils 0.20 0.00 - 6.90 %    Basophils 0.40 0.00 - 1.80 %    Immature Granulocytes 0.90 0.00 - 0.90 %    Nucleated RBC 0.00 0.00 - 0.20 /100 WBC    Neutrophils (Absolute) 8.04 (H) 1.82 - 7.42 K/uL    Lymphs (Absolute) 0.51 (L) 1.00 - 4.80 K/uL    Monos (Absolute) 0.52 0.00 - 0.85 K/uL    Eos (Absolute) 0.02 0.00 - 0.51 K/uL    Baso (Absolute) 0.04 0.00 - 0.12 K/uL    Immature Granulocytes (abs) 0.08 0.00 - 0.11 K/uL    NRBC (Absolute) 0.00 K/uL   COMP METABOLIC PANEL    Collection Time: 05/11/25  3:08 PM   Result Value Ref Range    Sodium 133 (L) 135 - 145 mmol/L    Potassium 3.9 3.6 - 5.5 mmol/L    Chloride 96 96 - 112 mmol/L    Co2 13 (L) 20 - 33 mmol/L    Anion Gap 24.0 (H) 7.0 - 16.0    Glucose 188 (H) 65 - 99 mg/dL    Bun 16 8 - 22 mg/dL    Creatinine 0.64 0.50 - 1.40 mg/dL    Calcium 8.9 8.5 - 10.5 mg/dL    Correct Calcium 8.8 8.5 - 10.5 mg/dL    AST(SGOT) 84 (H) 12 - 45 U/L    ALT(SGPT) 48 2 - 50 U/L    Alkaline Phosphatase 58 30 - 99 U/L    Total Bilirubin 0.9 0.1 - 1.5 mg/dL    Albumin 4.1 3.2 - 4.9 g/dL    Total Protein 7.2 6.0 - 8.2 g/dL    Globulin 3.1 1.9 - 3.5 g/dL    A-G Ratio 1.3 g/dL   MAGNESIUM    Collection Time: 05/11/25  3:08 PM   Result Value Ref Range    Magnesium 0.8 (LL) 1.5 - 2.5 mg/dL   IMMATURE PLT FRACTION    Collection Time: 05/11/25  3:08 PM   Result Value Ref Range    Imm. Plt Fraction 2.7 0.6 - 13.1 %   ESTIMATED GFR    Collection Time: 05/11/25  3:08 PM   Result Value Ref Range    GFR (CKD-EPI) 115 >60 mL/min/1.73 m 2   POC BREATHALIZER    Collection Time: 05/11/25  3:11 PM   Result Value Ref Range     POC Breathalizer 0.00 0.00 - 0.01 Percent   EKG (NOW)    Collection Time: 25  6:46 PM   Result Value Ref Range    Report       [facility]    Test Date:  2025  Pt Name:    NICOLA SEYMOUR                   Department: ER  MRN:        [id number]                      Room:       [facility]  Gender:     Female                       Technician: [id number]  :        1985                   Requested By:MAGNUS CUELLAR  Order #:    [id number]                    Reading MD: MAGNUS CUELLAR MD    Measurements  Intervals                                Axis  Rate:       85                           P:          37  DE:         164                          QRS:        51  QRSD:       77                           T:          46  QT:         369  QTc:        439    Interpretive Statements  Sinus rhythm  Compared to ECG 2025 14:31:59  No significant changes  Electronically Signed On 2025 18:46:42 PDT by MAGNUS CUELLAR MD         I have independently interpreted this EKG        COURSE & MEDICAL DECISION MAKING    ASSESSMENT, COURSE AND PLAN  Care Narrative: Patient has had a recurrent seizure, this is not uncommon for her.  She stated she has been compliant with her seizure medication having only missed this morning's dosage.  Patient was given a 1000 mg infusion of Keppra.  She looked somewhat shaking, was given 2.5 mg IV diazepam.  There may be some degree of alcohol withdrawal, her alcohol level is 0.  However there is no hyperadrenergic symptoms at this time.  Patient's electrolytes were abnormal, her magnesium returned at 0.8.  She is given 1 g of magnesium IV, plan for admission for electrolyte correction, ongoing evaluation.  Depressed CO2 likely secondary to recent seizure.  Patient is neurologically intact at this time, no evidence of stroke        DISPOSITION AND DISCUSSIONS  I have discussed management of the patient with the following physicians and LISET's:  Admitting hospitalist service      FINAL DIAGNOSIS  1. Seizure (HCC)    2. Hypomagnesemia         Electronically signed by: Elias Harris M.D., 5/11/2025 3:34 PM

## 2025-05-11 NOTE — ED NOTES
Pt tremulous - denies fevers or chills at home. Afebrile on arrival, POC breathalyzer 0.00  CIWA score 4 - reports mild HA, +tremors

## 2025-05-11 NOTE — ED NOTES
Patient medicated per MAR, verbalized understanding of medication education provided. VSS, respirations even and unlabored. call light within reach.

## 2025-05-12 VITALS
SYSTOLIC BLOOD PRESSURE: 129 MMHG | HEART RATE: 97 BPM | HEIGHT: 65 IN | BODY MASS INDEX: 20.94 KG/M2 | RESPIRATION RATE: 18 BRPM | TEMPERATURE: 98.2 F | DIASTOLIC BLOOD PRESSURE: 81 MMHG | WEIGHT: 125.66 LBS | OXYGEN SATURATION: 90 %

## 2025-05-12 PROBLEM — Z91.81 RISK FOR FALLS: Status: ACTIVE | Noted: 2025-05-12

## 2025-05-12 PROBLEM — F32.9 MAJOR DEPRESSIVE DISORDER: Status: ACTIVE | Noted: 2025-05-12

## 2025-05-12 LAB
ALBUMIN SERPL BCP-MCNC: 3.5 G/DL (ref 3.2–4.9)
ALBUMIN/GLOB SERPL: 1.3 G/DL
ALP SERPL-CCNC: 51 U/L (ref 30–99)
ALT SERPL-CCNC: 34 U/L (ref 2–50)
ANION GAP SERPL CALC-SCNC: 11 MMOL/L (ref 7–16)
AST SERPL-CCNC: 61 U/L (ref 12–45)
BASOPHILS # BLD AUTO: 0 % (ref 0–1.8)
BASOPHILS # BLD: 0 K/UL (ref 0–0.12)
BILIRUB SERPL-MCNC: 1 MG/DL (ref 0.1–1.5)
BUN SERPL-MCNC: 11 MG/DL (ref 8–22)
CALCIUM ALBUM COR SERPL-MCNC: 8.7 MG/DL (ref 8.5–10.5)
CALCIUM SERPL-MCNC: 8.3 MG/DL (ref 8.5–10.5)
CHLORIDE SERPL-SCNC: 101 MMOL/L (ref 96–112)
CO2 SERPL-SCNC: 21 MMOL/L (ref 20–33)
CREAT SERPL-MCNC: 0.46 MG/DL (ref 0.5–1.4)
DOHLE BOD BLD QL SMEAR: NORMAL
EOSINOPHIL # BLD AUTO: 0.08 K/UL (ref 0–0.51)
EOSINOPHIL NFR BLD: 0.9 % (ref 0–6.9)
ERYTHROCYTE [DISTWIDTH] IN BLOOD BY AUTOMATED COUNT: 46.2 FL (ref 35.9–50)
GFR SERPLBLD CREATININE-BSD FMLA CKD-EPI: 125 ML/MIN/1.73 M 2
GLOBULIN SER CALC-MCNC: 2.7 G/DL (ref 1.9–3.5)
GLUCOSE SERPL-MCNC: 88 MG/DL (ref 65–99)
HCT VFR BLD AUTO: 29.7 % (ref 37–47)
HGB BLD-MCNC: 10.2 G/DL (ref 12–16)
LYMPHOCYTES # BLD AUTO: 1.56 K/UL (ref 1–4.8)
LYMPHOCYTES NFR BLD: 18.1 % (ref 22–41)
MAGNESIUM SERPL-MCNC: 2.5 MG/DL (ref 1.5–2.5)
MANUAL DIFF BLD: NORMAL
MCH RBC QN AUTO: 35.2 PG (ref 27–33)
MCHC RBC AUTO-ENTMCNC: 34.3 G/DL (ref 32.2–35.5)
MCV RBC AUTO: 102.4 FL (ref 81.4–97.8)
MONOCYTES # BLD AUTO: 0.2 K/UL (ref 0–0.85)
MONOCYTES NFR BLD AUTO: 1.7 % (ref 0–13.4)
MORPHOLOGY BLD-IMP: NORMAL
MYELOCYTES NFR BLD MANUAL: 0.9 %
NEUTROPHILS # BLD AUTO: 6.74 K/UL (ref 1.82–7.42)
NEUTROPHILS NFR BLD: 75 % (ref 44–72)
NEUTS BAND NFR BLD MANUAL: 3.4 % (ref 0–10)
NRBC # BLD AUTO: 0 K/UL
NRBC BLD-RTO: 0 /100 WBC (ref 0–0.2)
PLATELET # BLD AUTO: 67 K/UL (ref 164–446)
PLATELET BLD QL SMEAR: NORMAL
PLATELETS.RETICULATED NFR BLD AUTO: 6.2 % (ref 0.6–13.1)
PMV BLD AUTO: 10.2 FL (ref 9–12.9)
POTASSIUM SERPL-SCNC: 3 MMOL/L (ref 3.6–5.5)
PROT SERPL-MCNC: 6.2 G/DL (ref 6–8.2)
RBC # BLD AUTO: 2.9 M/UL (ref 4.2–5.4)
RBC BLD AUTO: PRESENT
SODIUM SERPL-SCNC: 133 MMOL/L (ref 135–145)
WBC # BLD AUTO: 8.6 K/UL (ref 4.8–10.8)

## 2025-05-12 PROCEDURE — 99233 SBSQ HOSP IP/OBS HIGH 50: CPT | Performed by: STUDENT IN AN ORGANIZED HEALTH CARE EDUCATION/TRAINING PROGRAM

## 2025-05-12 PROCEDURE — A9270 NON-COVERED ITEM OR SERVICE: HCPCS | Performed by: STUDENT IN AN ORGANIZED HEALTH CARE EDUCATION/TRAINING PROGRAM

## 2025-05-12 PROCEDURE — 700102 HCHG RX REV CODE 250 W/ 637 OVERRIDE(OP): Performed by: STUDENT IN AN ORGANIZED HEALTH CARE EDUCATION/TRAINING PROGRAM

## 2025-05-12 PROCEDURE — 36415 COLL VENOUS BLD VENIPUNCTURE: CPT

## 2025-05-12 PROCEDURE — 85007 BL SMEAR W/DIFF WBC COUNT: CPT

## 2025-05-12 PROCEDURE — A9270 NON-COVERED ITEM OR SERVICE: HCPCS

## 2025-05-12 PROCEDURE — 700111 HCHG RX REV CODE 636 W/ 250 OVERRIDE (IP): Mod: JZ | Performed by: STUDENT IN AN ORGANIZED HEALTH CARE EDUCATION/TRAINING PROGRAM

## 2025-05-12 PROCEDURE — 85055 RETICULATED PLATELET ASSAY: CPT

## 2025-05-12 PROCEDURE — 700102 HCHG RX REV CODE 250 W/ 637 OVERRIDE(OP)

## 2025-05-12 PROCEDURE — 83735 ASSAY OF MAGNESIUM: CPT

## 2025-05-12 PROCEDURE — 85027 COMPLETE CBC AUTOMATED: CPT

## 2025-05-12 PROCEDURE — 80053 COMPREHEN METABOLIC PANEL: CPT

## 2025-05-12 RX ORDER — POTASSIUM CHLORIDE 1500 MG/1
40 TABLET, EXTENDED RELEASE ORAL EVERY 4 HOURS
Status: COMPLETED | OUTPATIENT
Start: 2025-05-12 | End: 2025-05-12

## 2025-05-12 RX ADMIN — POTASSIUM CHLORIDE 40 MEQ: 1500 TABLET, EXTENDED RELEASE ORAL at 09:48

## 2025-05-12 RX ADMIN — LEVETIRACETAM 1000 MG: 500 TABLET, FILM COATED ORAL at 05:37

## 2025-05-12 RX ADMIN — POTASSIUM CHLORIDE 40 MEQ: 1500 TABLET, EXTENDED RELEASE ORAL at 05:37

## 2025-05-12 RX ADMIN — LORAZEPAM 0.5 MG: 1 TABLET ORAL at 04:02

## 2025-05-12 RX ADMIN — ONDANSETRON 4 MG: 2 INJECTION INTRAMUSCULAR; INTRAVENOUS at 00:31

## 2025-05-12 RX ADMIN — LORAZEPAM 0.5 MG: 1 TABLET ORAL at 08:23

## 2025-05-12 ASSESSMENT — ENCOUNTER SYMPTOMS
WEAKNESS: 1
SEIZURES: 1
NAUSEA: 1

## 2025-05-12 ASSESSMENT — LIFESTYLE VARIABLES
AUDITORY DISTURBANCES: NOT PRESENT
VISUAL DISTURBANCES: NOT PRESENT
TREMOR: TREMOR NOT VISIBLE BUT CAN BE FELT, FINGERTIP TO FINGERTIP
AUDITORY DISTURBANCES: NOT PRESENT
PAROXYSMAL SWEATS: NO SWEAT VISIBLE
TOTAL SCORE: 4
VISUAL DISTURBANCES: NOT PRESENT
HEADACHE, FULLNESS IN HEAD: NOT PRESENT
AUDITORY DISTURBANCES: NOT PRESENT
NAUSEA AND VOMITING: INTERMITTENT NAUSEA WITH DRY HEAVES
PAROXYSMAL SWEATS: NO SWEAT VISIBLE
ORIENTATION AND CLOUDING OF SENSORIUM: ORIENTED AND CAN DO SERIAL ADDITIONS
NAUSEA AND VOMITING: *
TREMOR: TREMOR NOT VISIBLE BUT CAN BE FELT, FINGERTIP TO FINGERTIP
TOTAL SCORE: 6
ORIENTATION AND CLOUDING OF SENSORIUM: ORIENTED AND CAN DO SERIAL ADDITIONS
ANXIETY: NO ANXIETY (AT EASE)
ORIENTATION AND CLOUDING OF SENSORIUM: ORIENTED AND CAN DO SERIAL ADDITIONS
PAROXYSMAL SWEATS: NO SWEAT VISIBLE
AGITATION: NORMAL ACTIVITY
VISUAL DISTURBANCES: NOT PRESENT
HEADACHE, FULLNESS IN HEAD: NOT PRESENT
ORIENTATION AND CLOUDING OF SENSORIUM: ORIENTED AND CAN DO SERIAL ADDITIONS
ANXIETY: NO ANXIETY (AT EASE)
AUDITORY DISTURBANCES: NOT PRESENT
ANXIETY: NO ANXIETY (AT EASE)
TOTAL SCORE: 4
TREMOR: TREMOR NOT VISIBLE BUT CAN BE FELT, FINGERTIP TO FINGERTIP
AGITATION: NORMAL ACTIVITY
ANXIETY: NO ANXIETY (AT EASE)
TOTAL SCORE: 5
NAUSEA AND VOMITING: *
TREMOR: TREMOR NOT VISIBLE BUT CAN BE FELT, FINGERTIP TO FINGERTIP
NAUSEA AND VOMITING: *
AGITATION: NORMAL ACTIVITY
HEADACHE, FULLNESS IN HEAD: NOT PRESENT
PAROXYSMAL SWEATS: NO SWEAT VISIBLE
AGITATION: NORMAL ACTIVITY
VISUAL DISTURBANCES: NOT PRESENT
HEADACHE, FULLNESS IN HEAD: NOT PRESENT

## 2025-05-12 ASSESSMENT — FIBROSIS 4 INDEX: FIB4 SCORE: 5.93

## 2025-05-12 ASSESSMENT — PAIN DESCRIPTION - PAIN TYPE: TYPE: ACUTE PAIN

## 2025-05-12 NOTE — ED NOTES
Med Rec  complete per patient   Allergies reviewed  Antibiotics in the past 30 days:no  Anticoagulant in past 14 days:no  Pharmacy patient utilizes:[facility]

## 2025-05-12 NOTE — CARE PLAN
Problem: Pain - Standard  Goal: Alleviation of pain or a reduction in pain to the patient’s comfort goal  Outcome: Progressing     Problem: Optimal Care for Alcohol Withdrawal  Goal: Optimal Care for the alcohol withdrawal patient  Outcome: Progressing     Problem: Fall Risk  Goal: Patient will remain free from falls  Outcome: Progressing   The patient is Watcher - Medium risk of patient condition declining or worsening    Shift Goals  Clinical Goals: CIWA, monitor lab values    Progress made toward(s) clinical / shift goals:  Progressing    Patient is not progressing towards the following goals:

## 2025-05-12 NOTE — PATIENT INSTRUCTIONS
NEUROLOGY CLINIC VISIT WITH DR. WALLACE     PLEASE READ THIS ENTIRE DOCUMENT CAREFULLY AND COMPLETELY:    First and foremost, you matter to Dr. Wallace and you deserve the best care.   Dr. Wallace prides himself on providing the best possible care to all his patients. He strives to make each appointment meaningful, so that all your concerns are being addressed and all your neurological problems are being optimally treated. In order to achieve these goals for everyone, Dr. Wallace has listed important reminders and the best ways to prepare for each appointment. Please read each item carefully. Thank you!    Due to the high volume of patients we are trying to help, your physician will not be able to respond by phone or in [employer] to your routine concerns between appointments.  This does not reflect a lack of interest or concern for you or your diagnosis.  Please bring these questions and concerns to your appointment where your physician can answer.  Please relay more pressing concerns to our office, either via Credoraxhart, or by phone; if not able to reach us please visit nearby Urgent Care Center or Emergency Department.  If any emergent medical needs, please seek emergent medical help and/or call 911.    Also, please note that we are not able to fill out paperwork that might be related to your work, utility company, disability, and/or driving, among others, in between the visits.  Please schedule a dedicated appointment to address any and all paperwork.  This is not due to lack of concern or interest for your disease-related work/administrative problems, but to make sure that we provide the best possible care and to fill out your paperwork in a correct, complete, and timely manner.  ------------------------------------------------------------------------------------------  Please let our office know if you have any changes in your seizure frequency and/characteristics.     Please keep a diary of your seizures and bring it  with you to each appointment.    Please take vitamin D3 [id number] internation units daily.     Please abstain from driving until further notice    If you are a biological female with epilepsy who is of reproductive age, who is actively breastfeeding, and/or who infants/young children:  Please take folic acid 1 mg daily. This is an over-the-counter supplement that is recommended to prevent certain developmental problems in your baby, in case you become pregnant in the future.  It is critical that you let our office know as soon as you become pregnant or plan to become pregnant.  If you are caring for a baby/young child, please make sure to be sitting on a soft surface while holding your baby/young child, so in case you have a seizure, your baby/young child is not injured due to fall.   Please let us know if, while breastfeeding, you observe that your baby is excessively sleepy and/or has other behavioral changes. Because many antiseizure medications are collected in breast milk, some nursing babies can suffer adverse medication effects.    Please note that the following might precipitate seizures:   missed doses of antiseizure medications  being sick with a fever, stress  Fatigue  sleep deprivation or abnormal sleeping patterns  not eating regularly  not drinking enough water  drinking too much alcohol  stopping alcohol suddenly if you are currently using it on a regular/daily basis,   using recreational drugs, among others.    Please note that the following might lead to an injury or even be life-threatening in the event you have a seizure and/or lose awareness while:  being in a large body of water by yourself, such as bath, pool, lake, ocean, among others (risk of drowning)  being on unprotected heights (risk of fall)  being around and/or operating heavy machinery (risk of injury)  being around open fire/hot surfaces (risk of burns)  any other activities/circumstances, in which if you lose awareness, you might  injure yourself and/or others.  -------------------------------------------------------------------------------------------  SUDEP (SUDDEN UNEXPECTED DEATH IN EPILEPSY)  It is important that your seizures are well controlled and you have none or have them rarely. In addition to avoiding injury related to breakthrough seizures, frequent seizures increase risk of SUDEP (sudden unexpected death in epilepsy), where a person goes into a seizure and then never wakes up. The best way to prevent SUDEP is to control your seizures well.   ------------------------------------------------------------------------------------------  Please call for help (crisis line and/or 911) in case you have thoughts of harming yourself and/or others.  ------------------------------------------------------------------------------------------  INSTRUCTIONS FOR YOUR FAMILY/CAREGIVERS:  Please call 911 if the patient has a seizure longer than 2-3 minutes, if seizures are back to back without her recovering to her baseline, or she does not start recovering within 5-10 minutes after the seizure stops. During the seizure - please turn her on her side, please make sure her head is protected (for example, you should put a pillow under her head, if one is available), and please do not put anything in her mouth.   ------------------------------------------------------------------------------------------  PATIENT EXPECTATIONS,  IMPORTANT APPOINTMENT REMINDERS, AND ADDITIONAL HELPFUL TIPS:   REFILLS:   Request refills AT LEAST 1 week in advance to ensure you do not run out of medications    MyChart  It is STRONGLY encouraged that ALL patients sign up for MyChart. It is BY FAR the fastest and most convenient way for both Dr. Wallace and patients to obtain timely refills.  If you are having trouble signing up or logging into your account, staff are available to help you. Please ask a medical assistant or staff at the  to assist you.    TEST RESULS:    All labs and diagnostic test results will be reviewed at your next visit, UNLESS  Dr. Wallace determines that there are important findings on the tests need to be acted on sooner. Dr. Wallace will either call or send a message through [facility] if this is the case.    BE PREPARED PRIOR TO EVERY APPOINTMENT:  All patient are responsible for ensuring that ALL test results that were completed outside of the [facility] system have been received by our Neurology Department PRIOR to your appointment with Dr. Wallace.    IMPORTANT:  ALL images (not just the reports) must be sent and uploaded to the [facility] system. Dr. Wallace reviews all images personally prior to each visit. Ensuring that ALL the test results and test images are accessible to Dr. Wallace prior to your appointment is YOUR responsibility and an important part of making the most out of each appointment.   Bring a government-issues picture ID and an updated insurance card EVERY visit.  It is highly recommended that you bring at every visit a list of the most important topics that you want address. While it may not be possible to address all items on the list in a single visit, preparing a list will ensure that Dr. Wallace addresses the items that are most important to you and your health    PAPERWORK, DOCUMENTATION, LETTER REQUESTS:  You must notify the office ahead of your appointment of all paperwork or letter requests.   Please DO NOT wait until the last minute to make these requests. Please give all paperwork to the medical assistant at the start of the appointment and check-in process. Please note that Dr. Wallace may not be able complete some types of documentation in a single appointment or even within a single day or week. This is why it is important to communicate paperwork requests prior to your appointment and at least 2 weeks prior to any deadlines.    KNOW ALL YOU MEDICATIONS:   AT EVERY SINGLE APPOINTMENT, please bring a list of every single prescribed,  non-prescribed, and over the counter medication or supplements you are taking, including ones taken on a rare or intermittent basis.  Include the following information for each prescribed or non-prescribed medications:  Name of medication   The strength of EACH pill/capsule/tablet, etc.   The number of pills/capsules/tablets, etc taken per dose  The number and time of day that doses are taken  For every single Supplement that you take on a routine or intermittent basis, you must include:  The Brand Name   A complete list of every single ingredient, compound, vitamin, and/or mineral in each dose, along with the corresponding amounts/strengths of all ingredients, vitamins, minerals, etc., if such information is provided or known  The number of doses taken per day and time of day doses are taken  If medications are taken on an intermittent or as needed basis, please estimate how many days per week or days per month the medications are used  DO NOT just print out your medication list from [employer] or bring a list from a prior appointment or hospitalizations because the information is often often unreliable, inaccurate, outdated, and/or incomplete   The list should be printed or written  If you forget or do not have a list of all the medication, then it is acceptable, although less preferred, to bring all the bottles to the appointment     ARRIVE EARLY FOR ALL VISITS:  Please note that we are unable to accommodate late arrivals as per office policy.  YOU-the patient - (NOT a parent, spouse, or friend) must be physically present at check-in no later than 12 minutes after the scheduled appointment time, or you will be asked to reschedule   Consider scheduling a virtual appointment with Dr. Wallace through [employer] as an alternative if transportation to the clinic is difficult or unavailable   Please note, however, that virtual visits can only be scheduled after being an established patient of Dr. Wallace. All new appointments  "must be done in-person in clinic  Some insurances will not cover the cost of virtual appointments. Please check with your insurance to find out if these visits are covered    COMMUNICATING URGENT AND NON-URGENT MATTERS:  Your concerns are important and deserve to be heard and addressed. If you have an urgent matter, there are two methods that will ensure your concerns are prioritized appropriately:   Preferred method: Sign-up/Login to your [employer] account and send a message addressed to Dr. Wallace or Jennifer Rodríguez (Dr. Wallace's assistant). In the subject line, type \"urgent\" followed by a word or phrase describing the situation (For example, write \"Urgent: Out of antiseuzre med and need refill\" or \"Urgent: Severe side effects to new meds\". In doing this, our staff can ensure urgent messages are triaged appropriately and communicated to Dr. Wallace that day.  Call [facility] main line at [phone]. Dr. Wallace's voicemail extension is [phone]. When leaving a voice message, specifically indicate if it is urgent (or non-urgent) so that the matter can be triaged appropriately and addressed in a timely manner    Thank you for entrusting your neurological care to [facility] and we look forward to continuing to serve you.   "

## 2025-05-12 NOTE — PROGRESS NOTES
4 Eyes Skin Assessment Completed by CAROL Cedeno and CRISTELA Corbett.    Head Scab forehead, small  Ears Redness and Blanching  Nose WDL  Mouth WDL  Neck WDL  Breast/Chest WDL  Shoulder Blades WDL  Spine WDL  (R) Arm/Elbow/Hand WDL  (L) Arm/Elbow/Hand WDL  Abdomen WDL  Groin WDL  Scrotum/Coccyx/Buttocks WDL  (R) Leg WDL  (L) Leg WDL  (R) Heel/Foot/Toe Dry/ Calloused   (L) Heel/Foot/Toe Dry/Calloused          Devices In Places Tele Box, Blood Pressure Cuff, and Pulse Ox      Interventions In Place Pillows and Pressure Redistribution Mattress    Possible Skin Injury No    Pictures Uploaded Into Epic N/A  Wound Consult Placed N/A  RN Wound Prevention Protocol Ordered No

## 2025-05-12 NOTE — PROGRESS NOTES
CNA notified this RN of patient leaving AMA. Telemetry monitor taken off by patient and walked off the unit, per CNA. MD notified. Charge RN notified that patient left AMA with PIV intact.    Attempted to call patient twice back to removed PIV, Called [employer] to attempt a wellness check on pt to de-access the PIV

## 2025-05-12 NOTE — ASSESSMENT & PLAN NOTE
6 minutes spent on tobacco cessation.  Currently smokes half a pack of cigarettes a day.  She was counseled on benefits of cessation.  She was offered nicotine replacement therapy, though she states that she would like nicotine patch.

## 2025-05-12 NOTE — ASSESSMENT & PLAN NOTE
Patient has been drinking a pint of alcohol every few days, last alcoholic beverage yesterday  Currently tremulous and tachycardic  I will give a liter bolus of fluids now, followed by maintenance fluids  Ciwa protocol  MV, folic acid, thiamine

## 2025-05-12 NOTE — ASSESSMENT & PLAN NOTE
Bicarbonate 13 on CHEM panel, suspect from alcoholic ketoacidosis  Lactic acid pending  Fluids  Recheck in a.m.

## 2025-05-12 NOTE — ASSESSMENT & PLAN NOTE
Magnesium 0.8, likely from her continued alcohol use. QTC not prolonged  1 g of magnesium given in the ER, I will give another 4 g now  Recheck  magnesium in the morning

## 2025-05-12 NOTE — H&P
Hospital Medicine History & Physical Note    Date of Service  5/11/2025    Primary Care Physician  Pcp Pt States None    Consultants  None    Code Status  Full Code    Chief Complaint  Chief Complaint   Patient presents with    Seizure     Unwitnessed seizure activity. +oral trauma, hematoma to forehead noted. Pt reports PMH brain tumor and seizures, states compliance with keppra. Denies thinners. Pt arrives AAOx3, GCS 15, spo2 02% RA.        History of Presenting Illness  Franchesca Ortez is a 39 y.o. female who presented 5/11/2025 with a seizure.  Patient has a history of alcohol abuse and seizure disorder on Keppra.  She has been drinking a pint of alcohol every few days, her last alcoholic beverage was yesterday.  Today, she had been feeling nauseous and overall generalized weakness.  She then had a witnessed tonic-clonic seizure, witnessed by family member.  She has no recollection of the event.  She was brought to the ER for further evaluation.    In the ER, patient went to be tachycardic.  Pertinent labs include magnesium 0.8, bicarbonate 13, anion gap 24, AST 84.  Patient will be admitted for alcohol withdrawal and magnesium replacement.    I discussed the plan of care with patient.    Review of Systems  ROS    Past Medical History   has a past medical history of Alcohol abuse, Depression, History of suicide attempt, and Seizure (HCC).    Surgical History   has a past surgical history that includes gyn surgery and primary c section.     Family History  family history includes Cancer in her maternal grandmother; Diabetes in her father; Hypertension in her father.   Family history reviewed with patient. There is no family history that is pertinent to the chief complaint.     Social History   reports that she has been smoking cigarettes. She has a 7.5 pack-year smoking history. She has never used smokeless tobacco. She reports current alcohol use. She reports current drug use. Drug: Inhaled.    Allergies  No  Known Allergies    Medications  Prior to Admission Medications   Prescriptions Last Dose Informant Patient Reported? Taking?   Midazolam (NAYZILAM) 5 MG/0.1ML Solution Not Taking  No No   Sig: Administer 5 mg into affected nostril(S) as needed (Give 5 mg as 1 spray in 1 nostril for any tonic clonic seizure or any seizure > 1 minutes; repeat in 5 min in alternate nostril if needed. maximum dose: 10 mg/dose per episode (2 sprays);) for up to 30 days. Indications: Seizure   Patient not taking: Reported on 5/11/2025   acetaminophen (TYLENOL) 325 MG Tab Not Taking  No No   Sig: Take 2 Tablets by mouth every 6 hours as needed for Mild Pain, Moderate Pain or Fever.   Patient not taking: Reported on 5/11/2025   clonazePAM (KLONOPIN) 1 MG Tab Not Taking  No No   Sig: Take 1 Tablet by mouth 1 time a day as needed (seizures/aura OR impending seizure. Do not take more than 2 mg in 24 hours.) for up to 30 days.   Patient not taking: Reported on 5/11/2025   eslicarbazepine (APTIOM) 400 MG Tab Not Taking  No No   Sig: Take 1 Tablet by mouth every evening for 7 days, THEN 2 Tablets every evening for 14 days, THEN 3 Tablets every evening for 90 days.   Patient not taking: Reported on 5/11/2025   folic acid (FOLVITE) 1 MG Tab Not Taking  No No   Sig: Take 1 Tablet by mouth every day.   Patient not taking: Reported on 5/11/2025   levETIRAcetam (KEPPRA) 500 MG Tab 5/10/2025 Evening Patient No Yes   Sig: Take 2 tablets (1000 mg) orally in the morning and 2 tablets (1000 mg)  in the evening about 12 hours apart.   Patient taking differently: Take 1,000 mg by mouth 2 times a day. Take 2 tablets (1000 mg) orally in the morning and 2 tablets (1000 mg)  in the evening about 12 hours apart.   multivitamin Tab Not Taking  No No   Sig: Take 1 Tablet by mouth every day.   Patient not taking: Reported on 5/11/2025   thiamine (THIAMINE) 100 MG tablet   No No   Sig: Take 1 Tablet by mouth every day.      Facility-Administered Medications: None  "      Physical Exam  Temp:  [36.9 °C (98.4 °F)] 36.9 °C (98.4 °F)  Pulse:  [] 85  Resp:  [15-18] 17  BP: (109-129)/(67-78) 118/71  SpO2:  [88 %-96 %] 96 %  Blood Pressure: 118/71   Temperature: 36.9 °C (98.4 °F)   Pulse: 85   Respiration: 17   Pulse Oximetry: 96 %       Physical Exam  Constitutional:       Appearance: Normal appearance. She is normal weight.   HENT:      Head: Normocephalic.      Nose: Nose normal.      Mouth/Throat:      Mouth: Mucous membranes are moist.   Eyes:      Pupils: Pupils are equal, round, and reactive to light.   Cardiovascular:      Rate and Rhythm: Regular rhythm. Tachycardia present.      Pulses: Normal pulses.   Pulmonary:      Effort: Pulmonary effort is normal.      Breath sounds: Normal breath sounds.   Abdominal:      General: Abdomen is flat. Bowel sounds are normal.      Palpations: Abdomen is soft.   Musculoskeletal:         General: Normal range of motion.      Cervical back: Neck supple.      Comments: Tremors noted in upper extremities bilaterally   Skin:     General: Skin is warm.   Neurological:      General: No focal deficit present.      Mental Status: She is alert and oriented to person, place, and time. Mental status is at baseline.   Psychiatric:         Mood and Affect: Mood normal.         Behavior: Behavior normal.         Thought Content: Thought content normal.         Judgment: Judgment normal.         Laboratory:  Recent Labs     05/11/25  1508   WBC 9.2   RBC 3.23*   HEMOGLOBIN 11.3*   HEMATOCRIT 33.2*   .8*   MCH 35.0*   MCHC 34.0   RDW 48.0   PLATELETCT 80*   MPV 10.0     Recent Labs     05/11/25  1508   SODIUM 133*   POTASSIUM 3.9   CHLORIDE 96   CO2 13*   GLUCOSE 188*   BUN 16   CREATININE 0.64   CALCIUM 8.9     Recent Labs     05/11/25  1508   ALTSGPT 48   ASTSGOT 84*   ALKPHOSPHAT 58   TBILIRUBIN 0.9   GLUCOSE 188*         No results for input(s): \"NTPROBNP\" in the last 72 hours.      No results for input(s): \"TROPONINT\" in the last 72 " hours.    Imaging:  No orders to display       EKG:  I have personally reviewed the images and compared with prior images.    Assessment/Plan:  Justification for Admission Status  I anticipate this patient will require at least two midnights for appropriate medical management, necessitating inpatient admission because patient presents for hypomagnesemia and alcohol withdrawal    Patient will need a Telemetry bed on MEDICAL service .  The need is secondary to hypomagnesemia.    * Hypomagnesemia- (present on admission)  Assessment & Plan  Magnesium 0.8, likely from her continued alcohol use. QTC not prolonged  1 g of magnesium given in the ER, I will give another 4 g now  Recheck  magnesium in the morning    Seizure disorder (HCC)- (present on admission)  Assessment & Plan  Continue home dose of Keppra  On CIWA protocol      Alcohol withdrawal (HCC)- (present on admission)  Assessment & Plan  Patient has been drinking a pint of alcohol every few days, last alcoholic beverage yesterday  Currently tremulous and tachycardic  I will give a liter bolus of fluids now, followed by maintenance fluids  Alcohol withdrawal  IV thiamine IV folic acid      Metabolic acidosis- (present on admission)  Assessment & Plan  Bicarbonate 13 on CHEM panel, suspect from alcoholic ketoacidosis  Lactic acid pending  Fluids  Recheck in a.m.    Tobacco abuse- (present on admission)  Assessment & Plan  6 minutes spent on tobacco cessation.  Currently smokes half a pack of cigarettes a day.  She was counseled on benefits of cessation.  She was offered nicotine replacement therapy, though she states that she would like nicotine patch.      Patient is critically ill.   The patient continues to have: Alcohol withdrawal complicated by severe hypomagnesemia, metabolic acidosis  The vital organ system that is affected is the: CNS, cardiovascular system  If untreated there is a high chance of deterioration into: Delirium tremens  And eventually death.    The critical care that I am providing today is: 4 g IV magnesium, 1 L bolus of fluids, CIWA protocol, IV thiamine, IV folic acid  The critical that has been undertaken is medically complex.   There has been no overlap in critical care time.   Critical Care Time not including procedures: 48 minutes      VTE prophylaxis: enoxaparin ppx

## 2025-05-12 NOTE — PROGRESS NOTES
Bedside report received from off going RN/tech: Wilian assumed care of patient.     Fall Risk Score: HIGH RISK  Fall risk interventions in place: Place yellow fall risk ID band on patient, Provide patient/family education based on risk assessment, Educate patient/family to call staff for assistance when getting out of bed, Place fall precaution signage outside patient door, Place patient in room close to nursing station, and Utilize bed/chair fall alarm  Bed type: Regular (Marcus Score less than 17 interventions in place)  Patient on cardiac monitor: Yes  IVF/IV medications: Infusion per MAR (List Med(s)) see epic  Oxygen: Room Air  Bedside sitter: Not Applicable   Isolation: Not applicable

## 2025-05-12 NOTE — PROGRESS NOTES
Hospital Medicine Daily Progress Note    Date of Service  5/12/2025    Chief Complaint  Franchesca Ortez is a 39 y.o. female admitted 5/11/2025 with seizure    Hospital Course  39 y.o. female who presented 5/11/2025 with a seizure.  Patient has a history of alcohol abuse and seizure disorder on Keppra.  She has been drinking a pint of alcohol every few days, her last alcoholic beverage was yesterday.  Today, she had been feeling nauseous and overall generalized weakness.  She then had a witnessed tonic-clonic seizure, witnessed by family member.  She has no recollection of the event.  She was brought to the ER for further evaluation.     In the ER, patient went to be tachycardic.  Pertinent labs include magnesium 0.8, bicarbonate 13, anion gap 24, AST 84.  Patient will be admitted for alcohol withdrawal and magnesium replacement.    Interval Problem Update  -Notes were extensively reviewed from primary team, radiology, ED, surgery, specialists, etc.   -Reviewed labs to date, microbiology for current admit and prior  -Imaging was independently reviewed and interpreted    Seen patient at bedside  Reports nausea, weakness. Reports she had hx of seizure disorder. She [facility]  Seizure precautions  [facility] protocol  Advance diet as tolerated   I have reviewed EKG, NSR with nonspecific ST-T changes  Labs reviewed K 3.0, replaced     I have discussed this patient's plan of care and discharge plan at IDT rounds today with Case Management, Nursing, Nursing leadership, and other members of the IDT team.    Consultants/Specialty  na    Code Status  Prior    Disposition  The patient is not medically cleared for discharge to home or a post-acute facility.      I have placed the appropriate orders for post-discharge needs.    Review of Systems  Review of Systems   Constitutional:  Positive for malaise/fatigue.   Gastrointestinal:  Positive for nausea.   Neurological:  Positive for seizures and  weakness.   All other systems reviewed and are negative.       Physical Exam  Temp:  [36.8 °C (98.2 °F)-37 °C (98.6 °F)] 36.8 °C (98.2 °F)  Pulse:  [82-97] 97  Resp:  [17-20] 18  BP: (107-129)/(71-81) 129/81  SpO2:  [90 %-96 %] 90 %    Physical Exam  Vitals reviewed.   Constitutional:       Appearance: She is ill-appearing.   HENT:      Head: Normocephalic and atraumatic.      Nose: Nose normal.      Mouth/Throat:      Pharynx: Oropharynx is clear.   Eyes:      Extraocular Movements: Extraocular movements intact.      Conjunctiva/sclera: Conjunctivae normal.      Pupils: Pupils are equal, round, and reactive to light.   Cardiovascular:      Rate and Rhythm: Normal rate and regular rhythm.      Pulses: Normal pulses.      Heart sounds: Normal heart sounds.   Pulmonary:      Effort: Pulmonary effort is normal.      Breath sounds: Normal breath sounds.   Abdominal:      General: Abdomen is flat. Bowel sounds are normal.      Palpations: Abdomen is soft.   Musculoskeletal:         General: Normal range of motion.      Cervical back: Normal range of motion and neck supple.   Skin:     General: Skin is warm and dry.      Comments: Tremulous in hands   Neurological:      General: No focal deficit present.      Mental Status: She is alert and oriented to person, place, and time. Mental status is at baseline.   Psychiatric:         Mood and Affect: Mood normal.         Behavior: Behavior normal.         Fluids    Intake/Output Summary (Last 24 hours) at 5/12/2025 1656  Last data filed at 5/12/2025 0800  Gross per 24 hour   Intake 920 ml   Output 0 ml   Net 920 ml        Laboratory  Recent Labs     05/11/25  1508 05/12/25  0019   WBC 9.2 8.6   RBC 3.23* 2.90*   HEMOGLOBIN 11.3* 10.2*   HEMATOCRIT 33.2* 29.7*   .8* 102.4*   MCH 35.0* 35.2*   MCHC 34.0 34.3   RDW 48.0 46.2   PLATELETCT 80* 67*   MPV 10.0 10.2     Recent Labs     05/11/25  1508 05/12/25  0019   SODIUM 133* 133*   POTASSIUM 3.9 3.0*   CHLORIDE 96 101   CO2  13* 21   GLUCOSE 188* 88   BUN 16 11   CREATININE 0.64 0.46*   CALCIUM 8.9 8.3*                   Imaging  No orders to display        Assessment/Plan  * Hypomagnesemia- (present on admission)  Assessment & Plan  Magnesium 0.8, likely from her continued alcohol use. QTC not prolonged  1 g of magnesium given in the ER, I will give another 4 g now  Recheck  magnesium in the morning    Seizure disorder (HCC)- (present on admission)  Assessment & Plan  Continue home dose of Keppra  On CIWA protocol  Seizure precautions      Alcohol withdrawal (HCC)- (present on admission)  Assessment & Plan  Patient has been drinking a pint of alcohol every few days, last alcoholic beverage yesterday  Currently tremulous and tachycardic  I will give a liter bolus of fluids now, followed by maintenance fluids  Ciwa protocol  MV, folic acid, thiamine       Metabolic acidosis- (present on admission)  Assessment & Plan  Bicarbonate 13 on CHEM panel, suspect from alcoholic ketoacidosis  Lactic acid pending  Fluids  Recheck in a.m.    Tobacco abuse- (present on admission)  Assessment & Plan  6 minutes spent on tobacco cessation.  Currently smokes half a pack of cigarettes a day.  She was counseled on benefits of cessation.  She was offered nicotine replacement therapy, though she states that she would like nicotine patch.         VTE prophylaxis: lovenox    I have performed a physical exam and reviewed and updated ROS and Plan today (5/12/2025). In review of yesterday's note (5/11/2025), there are no changes except as documented above.    Patient is has a high medical complexity, complex decision making and is at high risk for complication, morbidity, and mortality.  I spent 51 minutes, reviewing the chart, obtaining and/or reviewing separately obtained history. Performing a medically appropriate examination and evaluation.  Counseling and educating the patient. Ordering and reviewing medications, tests, or procedures.  Documenting clinical  information in EPIC. Independently interpreting results and communicating results to patient. Discussing future disposition of care with patient, RN and case management.  This does not include time spent on separately billable procedures/tests.

## 2025-05-13 NOTE — DISCHARGE SUMMARY
AMA Discharge Summary    CHIEF COMPLAINT ON ADMISSION  Chief Complaint   Patient presents with    Seizure     Unwitnessed seizure activity. +oral trauma, hematoma to forehead noted. Pt reports PMH brain tumor and seizures, states compliance with keppra. Denies thinners. Pt arrives AAOx3, GCS 15, spo2 02% RA.        Reason for Admission  EMS     Admission Date  5/11/2025    CODE STATUS  Prior    HPI & HOSPITAL COURSE  This is a 39 y.o. female who presented 5/11/2025 with a seizure.  Patient has a history of alcohol abuse and seizure disorder on Keppra.  She has been drinking a pint of alcohol every few days, her last alcoholic beverage was one day prior. Reports nausea and weakness.  She then had a witnessed tonic-clonic seizure, witnessed by family member.  She has no recollection of the event.  She was brought to the ER for further evaluation.  Patient was given IVF and start on ciwa protocol. Labs notes hypokalemia and hypomagnesemia which were replaced. Keppra resumed. Diet advanced.     Patient still lethargic and noted hands tremulous. She is on IVF and on ciwa protocol. On 5/12 around 1417, patient notified RN of leaving AMA. She walked off the unit. I was notified after she left. Patient was Aox4 and has decision-making capacity when I evaluated her earlier in the morning. Patient is not a risk to herself or others. She does not have risk of imminent death.     Patient left the hospital AGAINST MEDICAL ADVICE      Therefore, she is discharged in guarded and stable condition against medcial advice.    Discharge Date  5/12/2025    FOLLOW UP ITEMS POST DISCHARGE  Return to ER with worsening symptoms    DISCHARGE DIAGNOSES  Principal Problem:    Hypomagnesemia (POA: Yes)  Active Problems:    Tobacco abuse (POA: Yes)    Metabolic acidosis (POA: Yes)    Alcohol withdrawal (HCC) (POA: Yes)    Seizure disorder (HCC) (POA: Yes)  Resolved Problems:    * No resolved hospital problems. *      FOLLOW UP  Future  Appointments   Date Time Provider Department Center   5/18/2025 11:15 AM Eugenio [facility] [address]   6/9/2025  9:40 AM BRAD[facility]   8/11/2025 10:30 AM EEG/EP LAB [id number] None   8/12/2025 10:30 AM EEG/EP LAB [id number] None   8/13/2025 10:30 AM EEG/EP LAB [id number] None   8/14/2025 10:30 AM EEG/EP LAB [id number] None     No follow-up provider specified.    MEDICATIONS ON DISCHARGE     Medication List        ASK your doctor about these medications        Instructions   acetaminophen 325 MG Tabs  Commonly known as: Tylenol   Take 2 Tablets by mouth every 6 hours as needed for Mild Pain, Moderate Pain or Fever.  Dose: 650 mg     clonazePAM 1 MG Tabs  Commonly known as: KlonoPIN   Doctor's comments: Request 10 tabs of 1 mg to cover 30 days.  Take 1 Tablet by mouth 1 time a day as needed (seizures/aura OR impending seizure. Do not take more than 2 mg in 24 hours.) for up to 30 days.  Dose: 1 mg     eslicarbazepine 400 MG Tabs  Start taking on: April 17, 2025  Commonly known as: Aptiom   Doctor's comments: PA DENIED (NOT COVER PLEASE HOLD RX)  Take 1 Tablet by mouth every evening for 7 days, THEN 2 Tablets every evening for 14 days, THEN 3 Tablets every evening for 90 days.     folic acid 1 MG Tabs  Commonly known as: Folvite   Take 1 Tablet by mouth every day.  Dose: 1 mg     levETIRAcetam 500 MG Tabs  Commonly known as: Keppra   Take 2 tablets (1000 mg) orally in the morning and 2 tablets (1000 mg)  in the evening about 12 hours apart.     multivitamin Tabs   Take 1 Tablet by mouth every day.  Dose: 1 Tablet     Nayzilam 5 MG/0.1ML Soln  Generic drug: Midazolam   Doctor's comments: Request 4 spray bottles of 5 mg to cover 30 days  Administer 5 mg into affected nostril(S) as needed (Give 5 mg as 1 spray in 1 nostril for any tonic clonic seizure or any seizure > 1 minutes; repeat in 5 min in alternate nostril if needed. maximum dose: 10 mg/dose per episode (2 sprays);) for up to 30  days. Indications: Seizure  Dose: 5 mg     thiamine 100 MG tablet  Commonly known as: Thiamine   Take 1 Tablet by mouth every day.  Dose: 100 mg              Allergies  No Known Allergies    DIET  No orders of the defined types were placed in this encounter.      ACTIVITY  As tolerated.  Weight bearing as tolerated    CONSULTATIONS  na    PROCEDURES  na    LABORATORY  Lab Results   Component Value Date    SODIUM 133 (L) 05/12/2025    POTASSIUM 3.0 (L) 05/12/2025    CHLORIDE 101 05/12/2025    CO2 21 05/12/2025    GLUCOSE 88 05/12/2025    BUN 11 05/12/2025    CREATININE 0.46 (L) 05/12/2025        Lab Results   Component Value Date    WBC 8.6 05/12/2025    HEMOGLOBIN 10.2 (L) 05/12/2025    HEMATOCRIT 29.7 (L) 05/12/2025    PLATELETCT 67 (L) 05/12/2025      No orders to display

## 2025-05-15 DIAGNOSIS — G40.109 FOCAL EPILEPSY (HCC): Primary | ICD-10-CM

## 2025-05-15 DIAGNOSIS — G40.409 TONIC CLONIC SEIZURES (HCC): ICD-10-CM

## 2025-05-15 DIAGNOSIS — G40.409: ICD-10-CM

## 2025-05-15 DIAGNOSIS — Z15.1: ICD-10-CM

## 2025-05-15 RX ORDER — CARBAMAZEPINE 400 MG/1
400 TABLET, EXTENDED RELEASE ORAL EVERY 12 HOURS
Qty: 60 TABLET | Refills: 5 | Status: SHIPPED | OUTPATIENT
Start: 2025-05-15 | End: 2025-11-11

## 2025-05-18 ENCOUNTER — HOSPITAL ENCOUNTER (OUTPATIENT)
Dept: RADIOLOGY | Facility: MEDICAL CENTER | Age: 40
End: 2025-05-18
Attending: STUDENT IN AN ORGANIZED HEALTH CARE EDUCATION/TRAINING PROGRAM
Payer: COMMERCIAL

## 2025-05-18 DIAGNOSIS — R56.9 CONVULSIONS, UNSPECIFIED CONVULSION TYPE (HCC): ICD-10-CM

## 2025-05-18 DIAGNOSIS — R29.818 AURA: ICD-10-CM

## 2025-05-18 DIAGNOSIS — R56.9 INCREASING FREQUENCY OF SEIZURE ACTIVITY (HCC): ICD-10-CM

## 2025-05-18 DIAGNOSIS — I60.9 SAH (SUBARACHNOID HEMORRHAGE) (HCC): ICD-10-CM

## 2025-05-18 DIAGNOSIS — R56.9 SEIZURES (HCC): ICD-10-CM

## 2025-05-18 DIAGNOSIS — G40.409 TONIC CLONIC SEIZURES (HCC): ICD-10-CM

## 2025-05-18 DIAGNOSIS — G40.109 FOCAL EPILEPSY (HCC): ICD-10-CM

## 2025-05-18 PROCEDURE — 700117 HCHG RX CONTRAST REV CODE 255: Mod: JZ,UD | Performed by: STUDENT IN AN ORGANIZED HEALTH CARE EDUCATION/TRAINING PROGRAM

## 2025-05-18 PROCEDURE — 70553 MRI BRAIN STEM W/O & W/DYE: CPT

## 2025-05-18 PROCEDURE — A9579 GAD-BASE MR CONTRAST NOS,1ML: HCPCS | Mod: JZ,UD | Performed by: STUDENT IN AN ORGANIZED HEALTH CARE EDUCATION/TRAINING PROGRAM

## 2025-05-18 RX ADMIN — GADOTERIDOL 12 ML: 279.3 INJECTION, SOLUTION INTRAVENOUS at 12:38

## 2025-06-21 ENCOUNTER — HOSPITAL ENCOUNTER (EMERGENCY)
Facility: MEDICAL CENTER | Age: 40
End: 2025-06-21
Attending: EMERGENCY MEDICINE
Payer: COMMERCIAL

## 2025-06-21 VITALS
OXYGEN SATURATION: 95 % | WEIGHT: 125 LBS | DIASTOLIC BLOOD PRESSURE: 79 MMHG | HEIGHT: 65 IN | BODY MASS INDEX: 20.83 KG/M2 | TEMPERATURE: 98.8 F | HEART RATE: 80 BPM | RESPIRATION RATE: 20 BRPM | SYSTOLIC BLOOD PRESSURE: 113 MMHG

## 2025-06-21 DIAGNOSIS — F10.11 HISTORY OF ALCOHOL ABUSE: ICD-10-CM

## 2025-06-21 DIAGNOSIS — R56.9 SEIZURE (HCC): Primary | ICD-10-CM

## 2025-06-21 DIAGNOSIS — Z91.199 NONCOMPLIANCE: ICD-10-CM

## 2025-06-21 PROCEDURE — A9270 NON-COVERED ITEM OR SERVICE: HCPCS | Mod: UD | Performed by: EMERGENCY MEDICINE

## 2025-06-21 PROCEDURE — 700102 HCHG RX REV CODE 250 W/ 637 OVERRIDE(OP): Mod: UD | Performed by: EMERGENCY MEDICINE

## 2025-06-21 PROCEDURE — 99283 EMERGENCY DEPT VISIT LOW MDM: CPT

## 2025-06-21 RX ORDER — LEVETIRACETAM 500 MG/1
1000 TABLET ORAL ONCE
Status: COMPLETED | OUTPATIENT
Start: 2025-06-21 | End: 2025-06-21

## 2025-06-21 RX ADMIN — LEVETIRACETAM 1000 MG: 500 TABLET, FILM COATED ORAL at 15:40

## 2025-06-21 ASSESSMENT — FIBROSIS 4 INDEX: FIB4 SCORE: 6.09

## 2025-06-21 NOTE — ED PROVIDER NOTES
ED Provider Note    Scribed for Esteban Genao by Theodora Duran. 6/21/2025  3:35 PM    Primary care provider: Pcp Pt States None  Means of arrival: Walk-In  History obtained from: Patient  History limited by: None    CHIEF COMPLAINT  Chief Complaint   Patient presents with    Seizure     Pt states she had witnessed seizure yesterday and today with loss of bowel and bladder control, pt has hx of seizures takes keppra but missed yesterdays and todays dose. Pt is A/O x 4.        EXTERNAL RECORDS REVIEWED  Inpatient Notes 5/11/2025 patient seen following a seizure. Patient has a history of alcohol abuse and seizure disorder on Keppra. She was admitted for hypomagnesemia.      HPI/ROS  LIMITATION TO HISTORY   Select: : None  OUTSIDE HISTORIAN(S):  None    HPI  Franchesca Ortez is a 40 y.o. female who presents to the Emergency Department via EMS for seizure onset one day ago. The patient reports that one day ago she had a witnessed seizure. She also reports having a seizure today. Today she reports having loss of bladder control. She reports a history of seizures, but has not taken her Keppra medication for the past two days. She reports a hi      REVIEW OF SYSTEMS  As above, all other systems reviewed and are negative.   See HPI for further details.     PAST MEDICAL HISTORY   has a past medical history of Alcohol abuse, Depression, History of suicide attempt, and Seizure (HCC).  SURGICAL HISTORY   has a past surgical history that includes gyn surgery and primary c section.  SOCIAL HISTORY  Social History[1]   Social History     Substance and Sexual Activity   Drug Use Yes    Types: Inhaled    Comment: marijuana     FAMILY HISTORY  Family History   Problem Relation Age of Onset    Hypertension Father     Diabetes Father     Cancer Maternal Grandmother         liver     CURRENT MEDICATIONS  Home Medications       Reviewed by Sonali Perales R.N. (Registered Nurse) on 06/21/25 at 1518  Med List Status: Not Addressed  "    Medication Last Dose Status   acetaminophen (TYLENOL) 325 MG Tab  Active   carBAMazepine SR (TEGRETOL XR) 400 MG TABLET SR 12 HR  Active   eslicarbazepine (APTIOM) 400 MG Tab  Active   folic acid (FOLVITE) 1 MG Tab  Active   levETIRAcetam (KEPPRA) 500 MG Tab  Active   multivitamin Tab  Active   thiamine (THIAMINE) 100 MG tablet  Active                  ALLERGIES  Allergies[2]    PHYSICAL EXAM    VITAL SIGNS:   Vitals:    06/21/25 1514 06/21/25 1517   BP:  125/80   Pulse:  87   Resp:  20   SpO2:  95%   Weight: 56.7 kg (125 lb)    Height: 1.651 m (5' 5\")        Vitals: My interpretation: normotensive, not tachycardic, afebrile, not hypoxic    Reinterpretation of vitals: Unchanged and unremarkable.      PE:   Gen: sitting comfortably, speaking clearly, appears in no acute distress   Head: Atraumatic  ENT: Mucous membranes moist, posterior pharynx clear, uvula midline, nares patent bilaterally   Neck: Supple, FROM  Pulmonary: Lungs are clear to auscultation bilaterally. No tachypnea  CV:  RRR, no murmur appreciated, pulses 2+ in both upper and lower extremities  Abdomen: soft, NT/ND; no rebound/guarding  : no CVA or suprapubic tenderness   Neuro: A&Ox4 (person, place, time, situation), speech fluent, gait steady, no focal deficits appreciated  Skin: No rash or lesions.  No pallor or jaundice.  No cyanosis.  Warm and dry.     COURSE & MEDICAL DECISION MAKING  Nursing notes, VS, PMSFHx, labs, imaging, EKG reviewed in chart.    ED Observation Status? No; Patient does not meet criteria for ED Observation.     Ddx: Strain, sprain, fracture, dislocation, seizure, noncompliance, alcohol drug    MDM: 3:35 PM Franchesca Ortez is a 40 y.o. female who presented with history of alcohol withdraw seizures, alcohol abuse, epilepsy, presenting for medication noncompliance with her Keppra and 2 seizures one yesterday and today.  Last bladder continence with the seizure today and had some tongue biting yesterday.  She has not " taken her Keppra in 48 hours.  States that she forgot.  She still has a medication at home.  She does want to be checked out make sure she is okay.  Denies any nausea vomiting or headache and vital signs are normal.  Physical exam is benign no signs of head trauma or injury, head to toe physical exam benign and atraumatic.  She is given a dose of 1000 g of oral Keppra here in the ED and I counseled her on being compliant with her medications as well as abstinence from alcohol.  Patient is ambulatory, neurologically intact and verbalized understand strict return cautions and outpatient follow-up plan and is amenable.    Z71.9 is a billable ICD-10 code used to specify a medical diagnosis of counseling, unspecified. I spent 4 minutes counseling patint on alcohol abuse.    ADDITIONAL PROBLEM LIST AND DISPOSITION    I have discussed management of the patient with the following physicians and LISET's:  None    Discussion of management with other QHP or appropriate source(s): None     Escalation of care considered, and ultimately not performed:acute inpatient care management, however at this time, the patient is most appropriate for outpatient management    Barriers to care at this time, including but not limited to: Patient does not have established PCP.     Decision tools and prescription drugs considered including, but not limited to: Keppra.    FINAL IMPRESSION  1. Seizure (HCC) Acute   2. History of alcohol abuse Acute   3. Noncompliance Acute      Theodora AVERY (Scribe), am scribing for, and in the presence of, Esteban Genao.    Electronically signed by: Theodora Duran ([place]), 6/21/2025    IEsteban personally performed the services described in this documentation, as scribed by Theodora Duran in my presence, and it is both accurate and complete.    The note accurately reflects work and decisions made by me.  Esteban Genao  6/21/2025  3:49 PM         [1]   Social History  Tobacco Use    Smoking  status: Every Day     Current packs/day: 0.50     Average packs/day: 0.5 packs/day for 15.0 years (7.5 ttl pk-yrs)     Types: Cigarettes    Smokeless tobacco: Never    Tobacco comments:     3/4 pk a day   Vaping Use    Vaping status: Never Used   Substance Use Topics    Alcohol use: Yes     Comment: Hx of 1 pint a day vodka. 1-2 drinks daily    Drug use: Yes     Types: Inhaled     Comment: marijuana   [2] No Known Allergies

## 2025-06-21 NOTE — ED NOTES
PIV removed. Patient provided discharge instructions, verbalizes understanding and denies any further questions. Patient instructed to return if condition worsens. No distress noted. Patient ambulated to the front lobby with a steady gait and all belongings.

## 2025-06-21 NOTE — DISCHARGE INSTRUCTIONS
Please make sure to be compliant with your medications.  Follow-up with your primary care team and neurology team as an outpatient.  If you have any worsening symptoms or concerns please return to the ED.  Thank you for coming in today.

## 2025-07-01 NOTE — PROGRESS NOTES
"Franchesca Ortez is a 34 y.o. female here for a non-provider visit for:   FLU    Reason for immunization: Annual Flu Vaccine  Immunization records indicate need for vaccine: Yes, confirmed with Epic  Minimum interval has been met for this vaccine: Yes  ABN completed: Not Indicated    Order and dose verified by: PAL  VIS Dated  08/15/19  was given to patient: declined  All IAC Questionnaire questions were answered \"No.\"    Patient tolerated injection and no adverse effects were observed or reported: Yes    Pt scheduled for next dose in series: Not Indicated  " [Date of Last HbgA1c: _____] : Date of Last HbgA1c: [unfilled] [Date of Last Lipid Profile: _____] : Date of Last Lipid Profile: [unfilled] [Plan Review] : Plan Review [Able to manage surrounding demands and opportunities] : able to manage surrounding demands and opportunities [Adherent to treatment recommendations] : adherent to treatment recommendations [Articulate] : articulate [Cognitively intact] : cognitively intact [Motivated to participate in treatment] : motivated to participate in treatment [Part of a supportive family] : part of a supportive family [Housing stability] : housing stability [Connected to healthcare] : connected to healthcare [FreeTextEntry2] : 7/1/26 [FreeTextEntry5] : To stay well and stable and manage medication side effects. [FreeTextEntry8] : No specific barriers seen at this time. [Mental Health] : Mental Health [Continued - Progress made] : Continued - Progress made: [every ___ months] : every [unfilled] months [Treatment is no longer medically necessary as evidenced by:] : Treatment is no longer medically necessary as evidenced by: [Yes - Participants:] : Yes - Participants: [Yes] : Yes [Psychiatric Provider/Prescriber] : Psychiatric Provider/Prescriber [FreeTextEntry1] : Bipolar illness [FreeTextEntry4] : Pt has been quite stable on current meds and wants to remain so. [de-identified] : Pt has been stable through period but has not made much progress regarding weight. [de-identified] : Pt will not experience any significant mood perturbations in either direction. [de-identified] : 7/1/26 [de-identified] : Pt will consider medication options is unable to lose weight with lifestyle changes. [de-identified] : 7/1/26 [de-identified] : MSE and pt report [de-identified] : Successful taper off of all medication (not currently part of treatment plan) [de-identified] : Pt's mother is present at all appointments and actively involved in decision making.
